# Patient Record
Sex: FEMALE | Race: BLACK OR AFRICAN AMERICAN | Employment: OTHER | URBAN - METROPOLITAN AREA
[De-identification: names, ages, dates, MRNs, and addresses within clinical notes are randomized per-mention and may not be internally consistent; named-entity substitution may affect disease eponyms.]

---

## 2018-01-09 ENCOUNTER — OFFICE VISIT (OUTPATIENT)
Dept: FAMILY MEDICINE CLINIC | Age: 68
End: 2018-01-09

## 2018-01-09 ENCOUNTER — HOSPITAL ENCOUNTER (OUTPATIENT)
Dept: LAB | Age: 68
Discharge: HOME | End: 2018-01-09
Payer: COMMERCIAL

## 2018-01-09 VITALS
SYSTOLIC BLOOD PRESSURE: 124 MMHG | HEIGHT: 64 IN | OXYGEN SATURATION: 98 % | RESPIRATION RATE: 18 BRPM | DIASTOLIC BLOOD PRESSURE: 84 MMHG | HEART RATE: 64 BPM | WEIGHT: 123 LBS | BODY MASS INDEX: 21 KG/M2 | TEMPERATURE: 97.8 F

## 2018-01-09 DIAGNOSIS — E78.5 DYSLIPIDEMIA: ICD-10-CM

## 2018-01-09 DIAGNOSIS — I10 ESSENTIAL HYPERTENSION: Primary | ICD-10-CM

## 2018-01-09 DIAGNOSIS — Z23 ENCOUNTER FOR IMMUNIZATION: ICD-10-CM

## 2018-01-09 DIAGNOSIS — Z78.0 POSTMENOPAUSAL: ICD-10-CM

## 2018-01-09 DIAGNOSIS — Z90.12 H/O LEFT MASTECTOMY: ICD-10-CM

## 2018-01-09 DIAGNOSIS — Z72.0 TOBACCO ABUSE: ICD-10-CM

## 2018-01-09 DIAGNOSIS — Z85.3 HISTORY OF BREAST CANCER: ICD-10-CM

## 2018-01-09 DIAGNOSIS — Z12.11 SCREEN FOR COLON CANCER: ICD-10-CM

## 2018-01-09 DIAGNOSIS — H40.9 GLAUCOMA OF RIGHT EYE, UNSPECIFIED GLAUCOMA TYPE: ICD-10-CM

## 2018-01-09 DIAGNOSIS — H54.40 BLIND LEFT EYE: ICD-10-CM

## 2018-01-09 DIAGNOSIS — Z11.59 NEED FOR HEPATITIS C SCREENING TEST: ICD-10-CM

## 2018-01-09 DIAGNOSIS — I10 ESSENTIAL HYPERTENSION: ICD-10-CM

## 2018-01-09 DIAGNOSIS — Z71.89 ACP (ADVANCE CARE PLANNING): ICD-10-CM

## 2018-01-09 DIAGNOSIS — M25.551 PAIN OF RIGHT HIP JOINT: ICD-10-CM

## 2018-01-09 DIAGNOSIS — R06.2 WHEEZE: ICD-10-CM

## 2018-01-09 DIAGNOSIS — Z12.39 SCREENING FOR BREAST CANCER: ICD-10-CM

## 2018-01-09 DIAGNOSIS — Z13.820 SCREENING FOR OSTEOPOROSIS: ICD-10-CM

## 2018-01-09 DIAGNOSIS — Z00.00 ENCOUNTER FOR MEDICARE ANNUAL WELLNESS EXAM: ICD-10-CM

## 2018-01-09 LAB
ALBUMIN SERPL-MCNC: 4.3 G/DL (ref 3.4–5)
ALBUMIN/GLOB SERPL: 1.4 {RATIO} (ref 0.8–1.7)
ALP SERPL-CCNC: 80 U/L (ref 45–117)
ALT SERPL-CCNC: 19 U/L (ref 13–56)
ANION GAP SERPL CALC-SCNC: 9 MMOL/L (ref 3–18)
AST SERPL-CCNC: 14 U/L (ref 15–37)
BASOPHILS # BLD: 0 K/UL (ref 0–0.06)
BASOPHILS NFR BLD: 0 % (ref 0–2)
BILIRUB SERPL-MCNC: 0.8 MG/DL (ref 0.2–1)
BUN SERPL-MCNC: 12 MG/DL (ref 7–18)
BUN/CREAT SERPL: 18 (ref 12–20)
CALCIUM SERPL-MCNC: 10 MG/DL (ref 8.5–10.1)
CHLORIDE SERPL-SCNC: 105 MMOL/L (ref 100–108)
CHOLEST SERPL-MCNC: 265 MG/DL
CO2 SERPL-SCNC: 28 MMOL/L (ref 21–32)
CREAT SERPL-MCNC: 0.66 MG/DL (ref 0.6–1.3)
DIFFERENTIAL METHOD BLD: NORMAL
EOSINOPHIL # BLD: 0.1 K/UL (ref 0–0.4)
EOSINOPHIL NFR BLD: 2 % (ref 0–5)
ERYTHROCYTE [DISTWIDTH] IN BLOOD BY AUTOMATED COUNT: 14 % (ref 11.6–14.5)
GLOBULIN SER CALC-MCNC: 3.1 G/DL (ref 2–4)
GLUCOSE SERPL-MCNC: 99 MG/DL (ref 74–99)
HCT VFR BLD AUTO: 37.9 % (ref 35–45)
HDLC SERPL-MCNC: 74 MG/DL (ref 40–60)
HDLC SERPL: 3.6 {RATIO} (ref 0–5)
HGB BLD-MCNC: 12.3 G/DL (ref 12–16)
LDLC SERPL CALC-MCNC: 167.8 MG/DL (ref 0–100)
LIPID PROFILE,FLP: ABNORMAL
LYMPHOCYTES # BLD: 2.8 K/UL (ref 0.9–3.6)
LYMPHOCYTES NFR BLD: 44 % (ref 21–52)
MCH RBC QN AUTO: 28.4 PG (ref 24–34)
MCHC RBC AUTO-ENTMCNC: 32.5 G/DL (ref 31–37)
MCV RBC AUTO: 87.5 FL (ref 74–97)
MONOCYTES # BLD: 0.4 K/UL (ref 0.05–1.2)
MONOCYTES NFR BLD: 7 % (ref 3–10)
NEUTS SEG # BLD: 3 K/UL (ref 1.8–8)
NEUTS SEG NFR BLD: 47 % (ref 40–73)
PLATELET # BLD AUTO: 353 K/UL (ref 135–420)
PMV BLD AUTO: 9.4 FL (ref 9.2–11.8)
POTASSIUM SERPL-SCNC: 4.4 MMOL/L (ref 3.5–5.5)
PROT SERPL-MCNC: 7.4 G/DL (ref 6.4–8.2)
RBC # BLD AUTO: 4.33 M/UL (ref 4.2–5.3)
SODIUM SERPL-SCNC: 142 MMOL/L (ref 136–145)
TRIGL SERPL-MCNC: 116 MG/DL (ref ?–150)
VLDLC SERPL CALC-MCNC: 23.2 MG/DL
WBC # BLD AUTO: 6.4 K/UL (ref 4.6–13.2)

## 2018-01-09 PROCEDURE — 80061 LIPID PANEL: CPT | Performed by: FAMILY MEDICINE

## 2018-01-09 PROCEDURE — 80053 COMPREHEN METABOLIC PANEL: CPT | Performed by: FAMILY MEDICINE

## 2018-01-09 PROCEDURE — 85025 COMPLETE CBC W/AUTO DIFF WBC: CPT | Performed by: FAMILY MEDICINE

## 2018-01-09 PROCEDURE — 86803 HEPATITIS C AB TEST: CPT | Performed by: FAMILY MEDICINE

## 2018-01-09 RX ORDER — CETIRIZINE HCL 10 MG
10 TABLET ORAL
Refills: 0 | COMMUNITY
Start: 2018-01-09 | End: 2018-10-17 | Stop reason: SDUPTHER

## 2018-01-09 RX ORDER — BRIMONIDINE TARTRATE, TIMOLOL MALEATE 2; 5 MG/ML; MG/ML
SOLUTION/ DROPS OPHTHALMIC
Refills: 3 | COMMUNITY
Start: 2017-12-14

## 2018-01-09 RX ORDER — CYCLOBENZAPRINE HCL 10 MG
TABLET ORAL
Refills: 0 | COMMUNITY
Start: 2017-10-31 | End: 2018-01-09 | Stop reason: SDUPTHER

## 2018-01-09 RX ORDER — AMLODIPINE BESYLATE 5 MG/1
5 TABLET ORAL DAILY
Refills: 1 | COMMUNITY
Start: 2018-01-09 | End: 2018-10-17 | Stop reason: SDUPTHER

## 2018-01-09 RX ORDER — TRAMADOL HYDROCHLORIDE 50 MG/1
50 TABLET ORAL
COMMUNITY
End: 2018-02-02

## 2018-01-09 RX ORDER — CETIRIZINE HCL 10 MG
TABLET ORAL
Refills: 0 | COMMUNITY
Start: 2017-12-20 | End: 2018-01-09 | Stop reason: SDUPTHER

## 2018-01-09 RX ORDER — AMLODIPINE BESYLATE 5 MG/1
TABLET ORAL
Refills: 1 | COMMUNITY
Start: 2017-12-04 | End: 2018-01-09 | Stop reason: SDUPTHER

## 2018-01-09 RX ORDER — CYCLOBENZAPRINE HCL 10 MG
10 TABLET ORAL
Refills: 0 | COMMUNITY
Start: 2018-01-09 | End: 2018-10-17 | Stop reason: SDUPTHER

## 2018-01-09 RX ORDER — ANASTROZOLE 1 MG/1
TABLET ORAL
Refills: 0 | COMMUNITY
Start: 2017-12-06 | End: 2020-09-01

## 2018-01-09 RX ORDER — ALBUTEROL SULFATE 90 UG/1
AEROSOL, METERED RESPIRATORY (INHALATION)
COMMUNITY
End: 2018-10-17 | Stop reason: SDUPTHER

## 2018-01-09 RX ORDER — ATORVASTATIN CALCIUM 40 MG/1
40 TABLET, FILM COATED ORAL DAILY
Refills: 1 | COMMUNITY
Start: 2018-01-09 | End: 2018-01-11 | Stop reason: DRUGHIGH

## 2018-01-09 RX ORDER — ASPIRIN 81 MG/1
TABLET ORAL
Refills: 2 | COMMUNITY
Start: 2017-12-04 | End: 2018-01-09 | Stop reason: SDUPTHER

## 2018-01-09 RX ORDER — ASPIRIN 81 MG/1
81 TABLET ORAL DAILY
Refills: 2 | COMMUNITY
Start: 2018-01-09 | End: 2018-05-09

## 2018-01-09 RX ORDER — ATORVASTATIN CALCIUM 40 MG/1
TABLET, FILM COATED ORAL
Refills: 1 | COMMUNITY
Start: 2017-12-04 | End: 2018-01-09 | Stop reason: SDUPTHER

## 2018-01-09 NOTE — PATIENT INSTRUCTIONS
Medicare Part B Preventive Services Limitations Recommendation Scheduled   Bone Mass Measurement  (age 72 & older, biennial) Requires diagnosis related to osteoporosis or estrogen deficiency. Biennial benefit unless patient has history of long-term glucocorticoid tx or baseline is needed because initial test was by other method Due    Cardiovascular Screening Blood Tests (every 5 years)  Total cholesterol, HDL, Triglycerides Order as a panel if possible Due    Colorectal Cancer Screening  -Fecal occult blood test (annual)  -Flexible sigmoidoscopy (5y)  -Screening colonoscopy (10y)  -Barium Enema  Due    Counseling to Prevent Tobacco Use (up to 8 sessions per year)  - Counseling greater than 3 and up to 10 minutes  - Counseling greater than 10 minutes Patients must be asymptomatic of tobacco-related conditions to receive as preventive service Patient is a current smoker. PCP provided counseling today. Diabetes Screening Tests (at least every 3 years, Medicare covers annually or at 6-month intervals for prediabetic patients)    Fasting blood sugar (FBS) or glucose tolerance test (GTT) Patient must be diagnosed with one of the following:  -Hypertension, Dyslipidemia, obesity, previous impaired FBS or GTT  Or any two of the following: overweight, FH of diabetes, age ? 72, history of gestational diabetes, birth of baby weighing more than 9 pounds Due    Diabetes Self-Management Training (DSMT) (no USPSTF recommendation) Requires referral by treating physician for patient with diabetes or renal disease. 10 hours of initial DSMT session of no less than 30 minutes each in a continuous 12-month period. 2 hours of follow-up DSMT in subsequent years.  N/A    Glaucoma Screening (no USPSTF recommendation) Diabetes mellitus, family history, , age 48 or over,  American, age 72 or over Due Patient is seeng Dr. Larissa Prado (Opthalmology)   Human Immunodeficiency Virus (HIV) Screening (annually for increased risk patients)  HIV-1 and HIV-2 by EIA, ANGELA, rapid antibody test, or oral mucosa transudate Patient must be at increased risk for HIV infection per USPSTF guidelines or pregnant. Tests covered annually for patients at increased risk. Pregnant patients may receive up to 3 test during pregnancy. N/A    Medical Nutrition Therapy (MNT) (for diabetes or renal disease not recommended schedule) Requires referral by treating physician for patient with diabetes or renal disease. Can be provided in same year as diabetes self-management training (DSMT), and CMS recommends medical nutrition therapy take place after DSMT. Up to 3 hours for initial year and 2 hours in subsequent years. N/A    Shingles Vaccination A shingles vaccine is also recommended once in a lifetime after age 61 Due    Seasonal Influenza Vaccination (annually)  Due    Pneumococcal Vaccination (once after 72)  Due    Hepatitis B Vaccinations (if medium/high risk) Medium/high risk factors:  End-stage renal disease,  Hemophiliacs who received Factor VIII or IX concentrates, Clients of institutions for the mentally retarded, Persons who live in the same house as a HepB virus carrier, Homosexual men, Illicit injectable drug abusers. N/A    Screening Mammography (biennial age 54-69) Annually (age 36 or over) Due Patient has hx of left breast cancer. Screening Pap Tests and Pelvic Examination (up to age 79 and after 79 if unknown history or abnormal study last 10 years) Every 24 months except high risk Due    Ultrasound Screening for Abdominal Aortic Aneurysm (AAA) (once) Patient must be referred through IPPE and not have had a screening for abdominal aortic aneurysm before under Medicare.   Limited to patients who meet one of the following criteria:  - Men who are 73-68 years old and have smoked more than 100 cigarettes in their lifetime.  -Anyone with a FH of AAA  -Anyone recommended for screening by USPSTF N/A

## 2018-01-09 NOTE — PROGRESS NOTES
YESENIA  Leatha Rodriguez comes in to establish care. Hip pain: Patient has right hip pain. Pain comes on and off and has been treated for sacroiliitis. Most recently was put on prednisone and this helped control the pain. Currently he takes tramadol as needed for this. Has yet to be seen by an orthopedist.  She also has trochanteric bursitis. We discussed follow-up by orthopedic physician but the patient would prefer to hold off on this at the moment since she is not having acute pain. We will continue to monitor and consider referral as needed. Breast cancer: Patient has history of left breast cancer and has had left mastectomy. Currently she takes Arimidex and she is followed up by the oncologist.  Does need a mammogram and request will be placed in. Ophthalmology: Patient has glaucoma in her right eye and has had surgery on the same. She takes brimonidine eyedrops for this. She is followed up by the ophthalmologist.  She is blind in her left eye. Hypertension: Patient is on amlodipine 5 mg daily. Blood pressure well controlled. Will check labs today. Dyslipidemia: Patient is a history of dyslipidemia and takes Lipitor 40 mg daily. Tobacco abuse: Patient does smoke occasionally. She states she no longer buy cigarettes and is on be given and this has led her to cut back markedly on amount of smoking. Advised on the advantages of quitting the habit and she does state that she has been trying to do so for a while. Does not want any medication or nicotine replacement therapy.       Past Medical History  Past Medical History:   Diagnosis Date    Arthritis     Blind left eye     Breast cancer ([facility] [address]     Glaucoma     High cholesterol     Hip pain     Hypertension        Surgical History  Past Surgical History:   Procedure Laterality Date    HX MASTECTOMY Left         Medications  Current Outpatient Prescriptions   Medication Sig Dispense Refill    anastrozole (ARIMIDEX) 1 mg tablet TK 1 T PO QD  0    COMBIGAN 0.2-0.5 % drop ophthalmic solution INSTILL 1 DROP DAILY IN RIGHT EYE  3    albuterol (VENTOLIN HFA) 90 mcg/actuation inhaler Take  by inhalation.  traMADol (ULTRAM) 50 mg tablet Take 50 mg by mouth every six (6) hours as needed for Pain.  amLODIPine (NORVASC) 5 mg tablet Take 1 Tab by mouth daily. 1    aspirin delayed-release 81 mg tablet Take 1 Tab by mouth daily. 2    cetirizine (ZYRTEC) 10 mg tablet Take 1 Tab by mouth daily as needed for Allergies. 0    cyclobenzaprine (FLEXERIL) 10 mg tablet Take 1 Tab by mouth three (3) times daily as needed for Muscle Spasm(s). 0    atorvastatin (LIPITOR) 40 mg tablet Take 1 Tab by mouth daily. 1       Allergies  No Known Allergies    Family History  No family history on file.     Social History  Social History     Social History    Marital status:      Spouse name: N/A    Number of children: N/A    Years of education: N/A     Occupational History    Retired      Social History Main Topics    Smoking status: Light Tobacco Smoker     Types: Cigarettes    Smokeless tobacco: Never Used    Alcohol use Yes      Comment: occ    Drug use: Yes     Special: Prescription    Sexual activity: No     Other Topics Concern     Service No    Blood Transfusions No    Caffeine Concern No    Occupational Exposure No    Hobby Hazards No    Sleep Concern No    Stress Concern No    Weight Concern No    Special Diet No    Back Care No    Exercise No    Bike Helmet No    Seat Belt Yes    Self-Exams Yes     Social History Narrative    No narrative on file       Review of Systems  Review of Systems - History obtained from the patient  General ROS: positive for  - fatigue and malaise  negative for - chills or fever  Psychological ROS: negative  Ophthalmic ROS: Glaucoma right eye and blindness left eye  ENT ROS: negative  Allergy and Immunology ROS: negative  Hematological and Lymphatic ROS: negative  Endocrine ROS: negative  Breast ROS: Left breast cancer, status post left mastectomy  Respiratory ROS: no cough, shortness of breath, or wheezing  Cardiovascular ROS: no chest pain or dyspnea on exertion  Gastrointestinal ROS: no abdominal pain, change in bowel habits, or black or bloody stools  Genito-Urinary ROS: negative  Musculoskeletal ROS: positive for - pain in hip - right  Neurological ROS: negative    Vital Signs  Visit Vitals    /84 (BP 1 Location: Left arm, BP Patient Position: Sitting)    Pulse 64    Temp 97.8 °F (36.6 °C) (Oral)    Resp 18    Ht 5' 4\" (1.626 m)    Wt 123 lb (55.8 kg)    SpO2 98%    BMI 21.11 kg/m2         Physical Exam  Physical Examination: General appearance - oriented to person, place, and time and acyanotic, in no respiratory distress  Mental status - alert, oriented to person, place, and time, affect appropriate to mood  Ears - hearing grossly normal bilaterally  Nose - normal and patent, no erythema, discharge or polyps  Mouth - mucous membranes moist, pharynx normal without lesions  Neck - supple, no significant adenopathy  Lymphatics - no palpable lymphadenopathy, no hepatosplenomegaly  Chest - clear to auscultation, no wheezes, rales or rhonchi, symmetric air entry  Heart - S1 and S2 normal  Abdomen - soft, nontender, nondistended, no masses or organomegaly  Breasts -status post left mastectomy  Back exam - limited range of motion  Neurological - alert, oriented, normal speech, no focal findings or movement disorder noted  Musculoskeletal - osteoarthritic changes noted in both hands, discomfort right hip to palpation  Extremities - no pedal edema noted, intact peripheral pulses    Results  No results found for this or any previous visit. ASSESSMENT and PLAN    ICD-10-CM ICD-9-CM    1. Essential hypertension I10 401.9 CBC WITH AUTOMATED DIFF      METABOLIC PANEL, COMPREHENSIVE      NM COLLECTION VENOUS BLOOD,VENIPUNCTURE   2.  History of breast cancer Z85.3 V10.3 ELLIOT MAMMO BI SCREENING INCL CAD      WY COLLECTION VENOUS BLOOD,VENIPUNCTURE   3. H/O left mastectomy Z90.12 V45.71 ELLIOT MAMMO BI SCREENING INCL CAD   4. Dyslipidemia E78.5 272.4 LIPID PANEL      WY COLLECTION VENOUS BLOOD,VENIPUNCTURE   5. Pain of right hip joint M25.551 719.45    6. Glaucoma of right eye, unspecified glaucoma type H40.9 365.9    7. Blind left eye H54.40 369.60    8. Wheeze R06.2 786.07    9. Encounter for Medicare annual wellness exam Z00.00 V70.0    10. ACP (advance care planning) Z71.89 V65.49    11. Postmenopausal Z78.0 V49.81 DEXA BONE DENSITY STUDY AXIAL   12. Screen for colon cancer Z12.11 V76.51 REFERRAL TO COLON AND RECTAL SURGERY   15. Screening for breast cancer Z12.31 V76.10 [facility] MAMMO BI SCREENING INCL CAD   15. Screening for osteoporosis Z13.820 V82.81 DEXA BONE DENSITY STUDY AXIAL   15. Need for hepatitis C screening test Z11.59 V73.89 HEPATITIS C AB   16. Tobacco abuse Z72.0 305.1    17. Encounter for immunization Z23 V03.89 INFLUENZA VIRUS VACCINE, HIGH DOSE SEASONAL, PRESERVATIVE FREE      ADMIN INFLUENZA VIRUS VAC     lab results and schedule of future lab studies reviewed with patient  reviewed diet, exercise and weight control  very strongly urged to quit smoking to reduce cardiovascular risk  cardiovascular risk and specific lipid/LDL goals reviewed  reviewed medications and side effects in detail  use of aspirin to prevent MI and TIA's discussed  radiology results and schedule of future radiology studies reviewed with patient    I have discussed the diagnosis with the patient and the intended plan of care as seen in the above orders. The patient has received an after-visit summary and questions were answered concerning future plans. I have discussed medication, side effects, and warnings with the patient in detail. The patient verbalized understanding and is in agreement with the plan of care. The patient will contact the office with any additional concerns.     Boris Dinh MD

## 2018-01-09 NOTE — PROGRESS NOTES
Chief Complaint   Patient presents with   [facility] [place] [place]ness Visit     1. Have you been to the ER, urgent care clinic since your last visit? Hospitalized since your last visit? Yes, about 2 months ago at [facility] for right hip pain. 2. Have you seen or consulted any other health care providers outside of the [address] [place] [address] since your last visit? Include any pap smears or colon screening. No      This is a Subsequent Medicare Annual Wellness Exam (AWV) (Performed 12 months after IPPE or effective date of Medicare Part B enrollment)    I have reviewed the patient's medical history in detail and updated the computerized patient record. History   Paulina Flores comes in for Medicare wellness visit. Past Medical History:   Diagnosis Date    Arthritis     Blind left eye     Breast cancer ([address]     Glaucoma     High cholesterol     Hip pain     Hypertension       Past Surgical History:   Procedure Laterality Date    HX MASTECTOMY Left      Current Outpatient Prescriptions   Medication Sig Dispense Refill    anastrozole (ARIMIDEX) 1 mg tablet TK 1 T PO  QD  0    cetirizine (ZYRTEC) 10 mg tablet   0    aspirin delayed-release 81 mg tablet   2    amLODIPine (NORVASC) 5 mg tablet   1    cyclobenzaprine (FLEXERIL) 10 mg tablet   0    atorvastatin (LIPITOR) 40 mg tablet   1    COMBIGAN 0.2-0.5 % drop ophthalmic solution INSTILL 1 DROP DAILY IN RIGHT EYE  3    albuterol (VENTOLIN HFA) 90 mcg/actuation inhaler Take  by inhalation.  traMADol (ULTRAM) 50 mg tablet Take 50 mg by mouth every six (6) hours as needed for Pain. No Known Allergies  No family history on file. Social History   Substance Use Topics    Smoking status: Light Tobacco Smoker     Types: Cigarettes    Smokeless tobacco: Never Used    Alcohol use Yes      Comment: occ     There is no problem list on file for this patient.       Depression Risk Factor Screening:     PHQ over the last two weeks 1/9/2018   Little interest or pleasure in doing things Not at all   Feeling down, depressed or hopeless Not at all   Total Score PHQ 2 0     Alcohol Risk Factor Screening: On any occasion in the past three months you have had more than 3 drinks containing alcohol. Functional Ability and Level of Safety:   Hearing Loss  Hearing is good. Activities of Daily Living  The home contains: no safety equipment. Patient does total self care    Fall Risk  Fall Risk Assessment, last 12 mths 1/9/2018   Able to walk? Yes   Fall in past 12 months? No       Abuse Screen  Patient is not abused    Cognitive Screening   Evaluation of Cognitive Function:  Has your family/caregiver stated any concerns about your memory: no  Normal    Review of Systems   A comprehensive review of systems was negative except for that written in the HPI. Physical Examination     Visit Vitals    /84 (BP 1 Location: Left arm, BP Patient Position: Sitting)    Pulse 64    Temp 97.8 °F (36.6 °C) (Oral)    Resp 18    Ht 5' 4\" (1.626 m)    Wt 123 lb (55.8 kg)    SpO2 98%    BMI 21.11 kg/m2     General appearance: alert, cooperative, no distress, appears stated age  Head: Normocephalic, without obvious abnormality, atraumatic  Lungs: clear to auscultation bilaterally  Heart: S1, S2 normal  Pulses: 2+ and symmetric  Neurologic: Alert and oriented X 3, normal strength and tone. Normal symmetric reflexes.  Normal coordination and gait    Patient Care Team   Patient Care Team:  Melida Andrews MD as PCP - General (Family Practice)  Josie Thakur MD (Ophthalmology)  Collins Hendrickson MD (Surgery)  Erika Cadena MD (Hematology and Oncology)    Assessment/Plan   Education and counseling provided:  Are appropriate based on today's review and evaluation  End-of-Life planning (with patient's consent)  Pneumococcal Vaccine  Influenza Vaccine  Screening Mammography  Colorectal cancer screening tests  Cardiovascular screening blood test  Bone mass measurement (DEXA)  Screening for glaucoma  Diabetes screening test    1. Encounter for Medicare annual wellness exam    2. ACP (advance care planning)    3. Screen for colon cancer  [facility] ref [facility]    4. Screening for breast cancer  - ELLIOT MAMMO BI SCREENING INCL CAD; Future    5. Screening for osteoporosis  - DEXA BONE DENSITY STUDY AXIAL; Future    6. Need for hepatitis C screening test  - HEPATITIS C AB; Future    7. Encounter for immunization  - Influenza virus vaccine ([address]) 72 years and older ([address])  - Administration fee () for Medicare insured patients    Health Maintenance Due   Topic Date Due    Hepatitis C Screening  1950    DTaP/Tdap/Td series (1 - Tdap) 12/08/1970    BREAST CANCER SCRN MAMMOGRAM  12/08/1999    FOBT Q 1 YEAR AGE 50-75  12/08/1999    ZOSTER VACCINE AGE 60>  10/08/2009    GLAUCOMA SCREENING Q2Y  12/08/2014    OSTEOPOROSIS SCREENING (DEXA)  12/08/2014    Pneumococcal 65+ Low/Medium Risk (1 of 2 - PCV13) 12/08/2014    MEDICARE YEARLY EXAM  12/08/2014    Influenza Age 9 to Adult  08/01/2017   I have discussed the diagnosis with the patient and the intended plan of care as seen in the above orders. The patient has received an after-visit summary and questions were answered concerning future plans. I have discussed medication, side effects, and warnings with the patient in detail. The patient verbalized understanding and is in agreement with the plan of care. The patient will contact the office with any additional concerns. Discussed, printed and given to patient.     Blanche Rose MD personalized preventative plan of care was

## 2018-01-09 NOTE — MR AVS SNAPSHOT
Visit Information Date & Time Provider Department Dept. Phone Encounter #  
 1/9/2018 11:00 AM Lele Scanlon. [address] [facility] [phone] [id number] Follow-up Instructions Return in about 1 month (around 2/9/2018), or if symptoms worsen or fail to improve, for htn, dyslipidemia, tobacco abuse. Upcoming Health Maintenance Date Due Hepatitis C Screening 1950 DTaP/Tdap/Td series (1 - Tdap) 12/8/1970 BREAST CANCER SCRN MAMMOGRAM 12/8/1999 FOBT Q 1 YEAR AGE 50-75 12/8/1999 ZOSTER VACCINE AGE 60> 10/8/2009 OSTEOPOROSIS SCREENING (DEXA) 12/8/2014 Pneumococcal 65+ Low/Medium Risk (1 of 2 - PCV13) 12/8/2014 MEDICARE YEARLY EXAM 12/8/2014 Influenza Age 5 to Adult 8/1/2017 GLAUCOMA SCREENING Q2Y 1/9/2020 Allergies as of 1/9/2018  Review Complete On: 1/9/2018 By: Charlene Mendez MD  
 No Known Allergies Current Immunizations  Never Reviewed No immunizations on file. Not reviewed this visit You Were Diagnosed With   
  
 Codes Comments History of breast cancer    -  Primary ICD-10-CM: Z85.3 ICD-9-CM: V10.3 H/O left mastectomy     ICD-10-CM: Z90.12 ICD-9-CM: V45.71 Dyslipidemia     ICD-10-CM: E78.5 ICD-9-CM: 272.4 Essential hypertension     ICD-10-CM: I10 
ICD-9-CM: 401.9 Pain of right hip joint     ICD-10-CM: M25.551 ICD-9-CM: 719.45 Glaucoma of right eye, unspecified glaucoma type     ICD-10-CM: H40.9 ICD-9-CM: 365.9 Blind left eye     ICD-10-CM: H54.40 ICD-9-CM: 369.60 Wheeze     ICD-10-CM: R06.2 ICD-9-CM: 786.07 Encounter for Medicare annual wellness exam     ICD-10-CM: Z00.00 ICD-9-CM: V70.0 ACP (advance care planning)     ICD-10-CM: Z71.89 ICD-9-CM: V65.49 Postmenopausal     ICD-10-CM: Z78.0 ICD-9-CM: V49.81 Screen for colon cancer     ICD-10-CM: Z12.11 ICD-9-CM: V76.51 Screening for breast cancer     ICD-10-CM: Z12.31 
ICD-9-CM: V76.10 Screening for osteoporosis     ICD-10-CM: Z13.820 ICD-9-CM: V82.81 Need for hepatitis C screening test     ICD-10-CM: Z11.59 
ICD-9-CM: V73.89 Tobacco abuse     ICD-10-CM: Z72.0 ICD-9-CM: 305.1 Vitals BP Pulse Temp Resp Height(growth percentile) Weight(growth percentile) 124/84 (BP 1 Location: Left arm, BP Patient Position: Sitting) 64 97.8 °F (36.6 °C) (Oral) 18 5' 4\" (1.626 m) 123 lb (55.8 kg) SpO2 BMI Smoking Status 98% 21.11 kg/m2 Light Tobacco Smoker BMI and BSA Data Body Mass Index Body Surface Area  
 21.11 kg/m 2 1.59 m 2 Your Updated Medication List  
  
   
This list is accurate as of: 1/9/18 11:59 AM.  Always use your most recent med list. amLODIPine 5 mg tablet Commonly known as:  Dickens Soles Take 1 Tab by mouth daily. anastrozole 1 mg tablet Commonly known as:  ARIMIDEX TK 1 T PO  QD  
  
 aspirin delayed-release 81 mg tablet Take 1 Tab by mouth daily. atorvastatin 40 mg tablet Commonly known as:  LIPITOR Take 1 Tab by mouth daily. cetirizine 10 mg tablet Commonly known as:  ZYRTEC Take 1 Tab by mouth daily as needed for Allergies. COMBIGAN 0.2-0.5 % Drop ophthalmic solution Generic drug:  brimonidine-timolol INSTILL 1 DROP DAILY IN RIGHT EYE  
  
 cyclobenzaprine 10 mg tablet Commonly known as:  FLEXERIL Take 1 Tab by mouth three (3) times daily as needed for Muscle Spasm(s). traMADol 50 mg tablet Commonly known as:  ULTRAM  
Take 50 mg by mouth every six (6) hours as needed for Pain. VENTOLIN HFA 90 mcg/actuation inhaler Generic drug:  albuterol Take  by inhalation. We Performed the Following REFERRAL TO COLON AND RECTAL SURGERY [REF17 Custom] Comments:  
 Screening colonoscopy Follow-up Instructions Return in about 1 month (around 2/9/2018), or if symptoms worsen or fail to improve, for htn, dyslipidemia, tobacco abuse. To-Do List   
 01/09/2018 Lab:  CBC WITH AUTOMATED DIFF   
  
 01/09/2018 Imaging:  DEXA BONE DENSITY STUDY AXIAL   
  
 01/09/2018 Lab:  HEPATITIS C AB   
  
 01/09/2018 Lab:  LIPID PANEL   
  
 01/09/2018 Imaging:  ELLIOT MAMMO BI SCREENING INCL CAD   
  
 01/09/2018 Lab:  METABOLIC PANEL, COMPREHENSIVE Referral Information Referral ID Referred By Referred To  
  
 [id number] DEMAR, [address] [facility], [address] [facility], [address] Phone: [phone] Fax: [phone] Visits Status Start Date End Date 1 New Request 1/9/18 1/9/19 If your referral has a status of pending review or denied, additional information will be sent to support the outcome of this decision. Patient Instructions Medicare Part B Preventive Services Limitations Recommendation Scheduled Bone Mass Measurement 
(age 72 & older, biennial) Requires diagnosis related to osteoporosis or estrogen deficiency. Biennial benefit unless patient has history of long-term glucocorticoid tx or baseline is needed because initial test was by other method Due   
Cardiovascular Screening Blood Tests (every 5 years) Total cholesterol, HDL, Triglycerides Order as a panel if possible Due Colorectal Cancer Screening 
-Fecal occult blood test (annual) -Flexible sigmoidoscopy (5y) 
-Screening colonoscopy (10y) -Barium Enema  Due   
Counseling to Prevent Tobacco Use (up to 8 sessions per year) - Counseling greater than 3 and up to 10 minutes - Counseling greater than 10 minutes Patients must be asymptomatic of tobacco-related conditions to receive as preventive service Patient is a current smoker. PCP provided counseling today. Diabetes Screening Tests (at least every 3 years, Medicare covers annually or at 6-month intervals for prediabetic patients) Fasting blood sugar (FBS) or glucose tolerance test (GTT) Patient must be diagnosed with one of the following: 
-Hypertension, Dyslipidemia, obesity, previous impaired FBS or GTT 
Or any two of the following: overweight, FH of diabetes, age ? 72, history of gestational diabetes, birth of baby weighing more than 9 pounds Due Diabetes Self-Management Training (DSMT) (no USPSTF recommendation) Requires referral by treating physician for patient with diabetes or renal disease. 10 hours of initial DSMT session of no less than 30 minutes each in a continuous 12-month period. 2 hours of follow-up DSMT in subsequent years. N/A Glaucoma Screening (no USPSTF recommendation) Diabetes mellitus, family history, , age 48 or over,  American, age 72 or over Due Patient is seeng Dr. Tonya Bishop (Opthalmology) Human Immunodeficiency Virus (HIV) Screening (annually for increased risk patients) HIV-1 and HIV-2 by EIA, ANGELA, rapid antibody test, or oral mucosa transudate Patient must be at increased risk for HIV infection per USPSTF guidelines or pregnant. Tests covered annually for patients at increased risk. Pregnant patients may receive up to 3 test during pregnancy. N/A Medical Nutrition Therapy (MNT) (for diabetes or renal disease not recommended schedule) Requires referral by treating physician for patient with diabetes or renal disease. Can be provided in same year as diabetes self-management training (DSMT), and CMS recommends medical nutrition therapy take place after DSMT. Up to 3 hours for initial year and 2 hours in subsequent years. N/A Shingles Vaccination A shingles vaccine is also recommended once in a lifetime after age 61 Due Seasonal Influenza Vaccination (annually)  Due Pneumococcal Vaccination (once after 65)  Due   
Hepatitis B Vaccinations (if medium/high risk) Medium/high risk factors:  End-stage renal disease, Hemophiliacs who received Factor VIII or IX concentrates, Clients of institutions for the mentally retarded, Persons who live in the same house as a HepB virus carrier, Homosexual men, Illicit injectable drug abusers. N/A Screening Mammography (biennial age 54-69) Annually (age 36 or over) Due Patient has hx of left breast cancer. Screening Pap Tests and Pelvic Examination (up to age 79 and after 79 if unknown history or abnormal study last 10 years) Every 24 months except high risk Due Ultrasound Screening for Abdominal Aortic Aneurysm (AAA) (once) Patient must be referred through IPPE and not have had a screening for abdominal aortic aneurysm before under Medicare. Limited to patients who meet one of the following criteria: 
- Men who are 73-68 years old and have smoked more than 100 cigarettes in their lifetime. 
-Anyone with a FH of AAA 
-Anyone recommended for screening by USPSTF N/A Introducing [facility]! [facility] introduces [employer] patient portal. Now you can access parts of your medical record, email your doctor's office, and request medication refills online. 1. In your internet browser, go to [email or web address] 2. Click on the First Time User? Click Here link in the Sign In box. You will see the New Member Sign Up page. 3. Enter your [employer] Access Code exactly as it appears below. You will not need to use this code after youve completed the sign-up process. If you do not sign up before the expiration date, you must request a new code. · [employer] Access Code: [email or web address] Expires: 4/9/2018 10:52 AM 
 
4. Enter the last four digits of your Social Security Number (xxxx) and Date of Birth (mm/dd/yyyy) as indicated and click Submit. You will be taken to the next sign-up page. 5. Create a agreement24 avtal24t ID. This will be your [employer] login ID and cannot be changed, so think of one that is secure and easy to remember. 6. Create a agreement24 avtal24t password. You can change your password at any time. 7. Enter your Password Reset Question and Answer. This can be used at a later time if you forget your password. 8. Enter your e-mail address. You will receive e-mail notification when new information is available in [address] 9. Click Sign Up. You can now view and download portions of your medical record. 10. Click the Download Summary menu link to download a portable copy of your medical information. If you have questions, please visit the Frequently Asked Questions section of the [employer] website. Remember, [employer] is NOT to be used for urgent needs. For medical emergencies, dial 911. Now available from your iPhone and Android! Please provide this summary of care documentation to your next provider. Your primary care clinician is listed as Blanche Robertson. If you have any questions after today's visit, please call [phone].

## 2018-01-09 NOTE — PROGRESS NOTES
Radha Ragland is a 75 y.o. female who presents for routine immunizations. She denies any symptoms , reactions or allergies that would exclude them from being immunized today. Risks and adverse reactions were discussed and the VIS was given to them. All questions were addressed. She was observed for 15min post injection. There were no reactions observed.     Jessica Chang LPN

## 2018-01-10 LAB
HCV AB SER IA-ACNC: 0.02 INDEX
HCV AB SERPL QL IA: NEGATIVE
HCV COMMENT,HCGAC: NORMAL

## 2018-01-10 NOTE — ACP (ADVANCE CARE PLANNING)
Advance Care Planning (ACP) Provider Note - Comprehensive     Date of ACP Conversation: 01/09/18  Persons included in Conversation:  patient  Length of ACP Conversation in minutes:  16 minutes    Authorized Decision Maker (if patient is incapable of making informed decisions): This person is:  Patient's daughter would be. Patient willAuthorized decision maker discussed with her daughter though she does state that she would like everything done at the moment. She will fill out forms given and bring these back for scanning into the EMR chart. General ACP for ALL Patients with Decision Making Capacity:   Importance of advance care planning, including choosing a healthcare agent to communicate patient's healthcare decisions if patient lost the ability to make decisions, such as after a sudden illness or accident  Understanding of the healthcare agent role was assessed and information provided  Exploration of values, goals, and preferences if recovery is not expected, even with continued medical treatment in the event of: Imminent death  Severe, permanent brain injury  \"In these circumstances, what matters most to you? \"  Care focused more on comfort or quality of life.   Opportunity offered to explore how cultural, Moravian, spiritual, or personal beliefs would affect decisions for future care     Interventions Provided:  Recommended completion of Advance Directive form after review of ACP materials and conversation with prospective healthcare agent      Blanche Crook MD

## 2018-01-11 DIAGNOSIS — E78.5 DYSLIPIDEMIA: Primary | ICD-10-CM

## 2018-01-11 RX ORDER — ATORVASTATIN CALCIUM 80 MG/1
80 TABLET, FILM COATED ORAL DAILY
Qty: 30 TAB | Refills: 2 | Status: SHIPPED | OUTPATIENT
Start: 2018-01-11 | End: 2018-10-17 | Stop reason: SDUPTHER

## 2018-01-11 NOTE — PROGRESS NOTES
Please let patient know her LDL cholesterol is elevated at 167. We would like this to go down to below 100. I would advise that she increase her Lipitor to 80 mg daily. Recheck labs in 3 months. I will send in a prescription for Lipitor at the higher dose. She should also exercise and take diet low in polysaturated fats.   Luanne Casey MD

## 2018-01-17 NOTE — PROGRESS NOTES
Spoke with patient regarding lab results at this time. Patient verbalized understanding at this time.

## 2018-02-02 ENCOUNTER — OFFICE VISIT (OUTPATIENT)
Dept: FAMILY MEDICINE CLINIC | Age: 68
End: 2018-02-02

## 2018-02-02 VITALS
SYSTOLIC BLOOD PRESSURE: 119 MMHG | DIASTOLIC BLOOD PRESSURE: 70 MMHG | RESPIRATION RATE: 20 BRPM | HEIGHT: 64 IN | WEIGHT: 124 LBS | BODY MASS INDEX: 21.17 KG/M2 | OXYGEN SATURATION: 97 % | HEART RATE: 68 BPM | TEMPERATURE: 97.9 F

## 2018-02-02 DIAGNOSIS — M47.816 SPONDYLOSIS OF LUMBAR SPINE: ICD-10-CM

## 2018-02-02 DIAGNOSIS — M54.31 SCIATICA OF RIGHT SIDE: ICD-10-CM

## 2018-02-02 DIAGNOSIS — M54.41 ACUTE RIGHT-SIDED LOW BACK PAIN WITH RIGHT-SIDED SCIATICA: Primary | ICD-10-CM

## 2018-02-02 RX ORDER — ACETAMINOPHEN AND CODEINE PHOSPHATE 300; 30 MG/1; MG/1
1 TABLET ORAL
Qty: 10 TAB | Refills: 0 | Status: SHIPPED | OUTPATIENT
Start: 2018-02-02 | End: 2018-02-12

## 2018-02-02 RX ORDER — METHYLPREDNISOLONE 4 MG/1
TABLET ORAL
Qty: 1 DOSE PACK | Refills: 0 | Status: SHIPPED | OUTPATIENT
Start: 2018-02-02 | End: 2018-02-12

## 2018-02-02 NOTE — PATIENT INSTRUCTIONS
Sciatica: Care Instructions  Your Care Instructions    Sciatica (say \"fmj-DA-ju-kuh\") is an irritation of one of the sciatic nerves, which come from the spinal cord in the lower back. The sciatic nerves and their branches extend down through the buttock to the foot. Sciatica can develop when an injured disc in the back presses against a spinal nerve root. Its main symptom is pain, numbness, or weakness that is often worse in the leg or foot than in the back. Sciatica often will improve and go away with time. Early treatment usually includes medicines and exercises to relieve pain. Follow-up care is a key part of your treatment and safety. Be sure to make and go to all appointments, and call your doctor if you are having problems. It's also a good idea to know your test results and keep a list of the medicines you take. How can you care for yourself at home? · Take pain medicines exactly as directed. ¨ If the doctor gave you a prescription medicine for pain, take it as prescribed. ¨ If you are not taking a prescription pain medicine, ask your doctor if you can take an over-the-counter medicine. · Use heat or ice to relieve pain. ¨ To apply heat, put a warm water bottle, heating pad set on low, or warm cloth on your back. Do not go to sleep with a heating pad on your skin. ¨ To use ice, put ice or a cold pack on the area for 10 to 20 minutes at a time. Put a thin cloth between the ice and your skin. · Avoid sitting if possible, unless it feels better than standing. · Alternate lying down with short walks. Increase your walking distance as you are able to without making your symptoms worse. · Do not do anything that makes your symptoms worse. When should you call for help? Call 911 anytime you think you may need emergency care. For example, call if:  · You are unable to move a leg at all.   Call your doctor now or seek immediate medical care if:  · You have new or worse symptoms in your legs or buttocks. Symptoms may include:  ¨ Numbness or tingling. ¨ Weakness. ¨ Pain. · You lose bladder or bowel control. Watch closely for changes in your health, and be sure to contact your doctor if:  · You are not getting better as expected. Where can you learn more? Go to http://lady-per.info/. Enter [phone] in the search box to learn more about \"Sciatica: Care Instructions. \"  Current as of: March 21, 2017  Content Version: 11.4  © 3446-0219 "[employer] Care instructions adapted under license by [employer] (which disclaims liability or warranty for this information). If you have questions about a medical condition or this instruction, always ask your healthcare professional. [address] any warranty or liability for your use of this information. Sciatica: Exercises  Your Care Instructions  Here are some examples of typical rehabilitation exercises for your condition. Start each exercise slowly. Ease off the exercise if you start to have pain. Your doctor or physical therapist will tell you when you can start these exercises and which ones will work best for you. When you are not being active, find a comfortable position for rest. Some people are comfortable on the floor or a medium-firm bed with a small pillow under their head and another under their knees. Some people prefer to lie on their side with a pillow between their knees. Don't stay in one position for too long. Take short walks (10 to 20 minutes) every 2 to 3 hours. Avoid slopes, hills, and stairs until you feel better. Walk only distances you can manage without pain, especially leg pain. How to do the exercises  Back stretches    1. Get down on your hands and knees on the floor. 2. Relax your head and allow it to droop. Round your back up toward the ceiling until you feel a nice stretch in your upper, middle, and lower back.  Hold this stretch for as long as it feels comfortable, or about 15 to 30 seconds. 3. Return to the starting position with a flat back while you are on your hands and knees. 4. Let your back sway by pressing your stomach toward the floor. Lift your buttocks toward the ceiling. 5. Hold this position for 15 to 30 seconds. 6. Repeat 2 to 4 times. Follow-up care is a key part of your treatment and safety. Be sure to make and go to all appointments, and call your doctor if you are having problems. It's also a good idea to know your test results and keep a list of the medicines you take. Where can you learn more? Go to http://lady-per.info/. Enter D995 in the search box to learn more about \"Sciatica: Exercises. \"  Current as of: March 21, 2017  Content Version: 11.4  © 0233-5163 [employer]. Care instructions adapted under license by [employer] (which disclaims liability or warranty for this information). If you have questions about a medical condition or this instruction, always ask your healthcare professional. [address] any warranty or liability for your use of this information.

## 2018-02-02 NOTE — MR AVS SNAPSHOT
[facility] [address] [facility] 
[phone] Patient: Marilin Carrera MRN: [id number] :1950 Visit Information Date & Time Provider Department Dept. Phone Encounter #  
 2018  1:30 PM Christianne Husain, [address] [phone] [id number] Follow-up Instructions Return if symptoms worsen or fail to improve. Your Appointments 2018 11:00 AM  
ROUTINE CARE with Blanche Sanderson MD  
[address] ([address]) Appt Note: htn, dyslipidemia, tobacco abuse [address] [place] [address]  
[phone]  
  
   
 [address]  
  
    
 3/16/2018 11:00 AM  
COLON SCREEN with TSS [facility] NURSE VISIT [facility] ([address]) Appt Note: ref from [facility] screen; ref from [facility] screen [address] [place] [address] [address] [place] [address] Upcoming Health Maintenance Date Due DTaP/Tdap/Td series (1 - Tdap) 1970 BREAST CANCER SCRN MAMMOGRAM 1999 FOBT Q 1 YEAR AGE 50-75 1999 ZOSTER VACCINE AGE 60> 10/8/2009 OSTEOPOROSIS SCREENING (DEXA) 2014 Pneumococcal 65+ Low/Medium Risk (1 of 2 - PCV13) 2014 MEDICARE YEARLY EXAM 1/10/2019 GLAUCOMA SCREENING Q2Y 2020 Allergies as of 2018  Review Complete On: 2018 By: Christianne Husain NP No Known Allergies Current Immunizations  Never Reviewed Name Date Influenza High Dose Vaccine PF 2018 Not reviewed this visit You Were Diagnosed With   
  
 Codes Comments Acute right-sided low back pain with right-sided sciatica    -  Primary ICD-10-CM: M54.41 
ICD-9-CM: 724.2, 724.3 Sciatica of right side     ICD-10-CM: M54.31 
ICD-9-CM: 724.3 Vitals BP Pulse Temp Resp Height(growth percentile) Weight(growth percentile) 119/70 (BP 1 Location: Right arm, BP Patient Position: Sitting) 68 97.9 °F (36.6 °C) 20 5' 4\" (1.626 m) 124 lb (56.2 kg) SpO2 BMI OB Status Smoking Status 97% 21.28 kg/m2 Menopause Light Tobacco Smoker Vitals History BMI and BSA Data Body Mass Index Body Surface Area  
 21.28 kg/m 2 1.59 m 2 Preferred Pharmacy Pharmacy Name Phone [id number] [address] [phone] Your Updated Medication List  
  
   
This list is accurate as of: 2/2/18  2:09 PM.  Always use your most recent med list.  
  
  
  
  
 acetaminophen-codeine 300-30 mg per tablet Commonly known as:  TYLENOL #3 Take 1 Tab by mouth every eight (8) hours as needed for Pain. Max Daily Amount: 3 Tabs. amLODIPine 5 mg tablet Commonly known as:  Claudell Debo Take 1 Tab by mouth daily. anastrozole 1 mg tablet Commonly known as:  ARIMIDEX TK 1 T PO  QD  
  
 aspirin delayed-release 81 mg tablet Take 1 Tab by mouth daily. atorvastatin 80 mg tablet Commonly known as:  LIPITOR Take 1 Tab by mouth daily. cetirizine 10 mg tablet Commonly known as:  ZYRTEC Take 1 Tab by mouth daily as needed for Allergies. COMBIGAN 0.2-0.5 % Drop ophthalmic solution Generic drug:  brimonidine-timolol INSTILL 1 DROP DAILY IN RIGHT EYE  
  
 cyclobenzaprine 10 mg tablet Commonly known as:  FLEXERIL Take 1 Tab by mouth three (3) times daily as needed for Muscle Spasm(s). methylPREDNISolone 4 mg tablet Commonly known as:  Meeta Loyall Take as directed  
  
 traMADol 50 mg tablet Commonly known as:  ULTRAM  
Take 50 mg by mouth every six (6) hours as needed for Pain. VENTOLIN HFA 90 mcg/actuation inhaler Generic drug:  albuterol Take  by inhalation. Prescriptions Printed Refills acetaminophen-codeine (TYLENOL #3) 300-30 mg per tablet 0 Sig: Take 1 Tab by mouth every eight (8) hours as needed for Pain. Max Daily Amount: 3 Tabs. Class: Print Route: Oral  
  
Prescriptions Sent to Pharmacy Refills  
 methylPREDNISolone (MEDROL DOSEPACK) 4 mg tablet 0 Sig: Take as directed Class: Normal  
 Pharmacy: [facility]ON[id number] [place] [address] [facility] #: [phone] Follow-up Instructions Return if symptoms worsen or fail to improve. Patient Instructions Sciatica: Care Instructions Your Care Instructions Sciatica (say \"bpn-RA-oy-kuh\") is an irritation of one of the sciatic nerves, which come from the spinal cord in the lower back. The sciatic nerves and their branches extend down through the buttock to the foot. Sciatica can develop when an injured disc in the back presses against a spinal nerve root. Its main symptom is pain, numbness, or weakness that is often worse in the leg or foot than in the back. Sciatica often will improve and go away with time. Early treatment usually includes medicines and exercises to relieve pain. Follow-up care is a key part of your treatment and safety. Be sure to make and go to all appointments, and call your doctor if you are having problems. It's also a good idea to know your test results and keep a list of the medicines you take. How can you care for yourself at home? · Take pain medicines exactly as directed. ¨ If the doctor gave you a prescription medicine for pain, take it as prescribed. ¨ If you are not taking a prescription pain medicine, ask your doctor if you can take an over-the-counter medicine. · Use heat or ice to relieve pain. ¨ To apply heat, put a warm water bottle, heating pad set on low, or warm cloth on your back. Do not go to sleep with a heating pad on your skin.  
¨ To use ice, put ice or a cold pack on the area for 10 to 20 minutes at a time. Put a thin cloth between the ice and your skin. · Avoid sitting if possible, unless it feels better than standing. · Alternate lying down with short walks. Increase your walking distance as you are able to without making your symptoms worse. · Do not do anything that makes your symptoms worse. When should you call for help? Call 911 anytime you think you may need emergency care. For example, call if: 
· You are unable to move a leg at all. Call your doctor now or seek immediate medical care if: 
· You have new or worse symptoms in your legs or buttocks. Symptoms may include: ¨ Numbness or tingling. ¨ Weakness. ¨ Pain. · You lose bladder or bowel control. Watch closely for changes in your health, and be sure to contact your doctor if: 
· You are not getting better as expected. Where can you learn more? Go to http://ladyAHS PharmStatper.info/. Enter [phone] in the search box to learn more about \"Sciatica: Care Instructions. \" Current as of: March 21, 2017 Content Version: 11.4 © 9362-6138 Tray. Care instructions adapted under license by [employer] (which disclaims liability or warranty for this information). If you have questions about a medical condition or this instruction, always ask your healthcare professional. [address] any warranty or liability for your use of this information. Sciatica: Exercises Your Care Instructions Here are some examples of typical rehabilitation exercises for your condition. Start each exercise slowly. Ease off the exercise if you start to have pain. Your doctor or physical therapist will tell you when you can start these exercises and which ones will work best for you. When you are not being active, find a comfortable position for rest. Some people are comfortable on the floor or a medium-firm bed with a small pillow under their head and another under their knees.  Some people prefer to lie on their side with a pillow between their knees. Don't stay in one position for too long. Take short walks (10 to 20 minutes) every 2 to 3 hours. Avoid slopes, hills, and stairs until you feel better. Walk only distances you can manage without pain, especially leg pain. How to do the exercises Back stretches 1. Get down on your hands and knees on the floor. 2. Relax your head and allow it to droop. Round your back up toward the ceiling until you feel a nice stretch in your upper, middle, and lower back. Hold this stretch for as long as it feels comfortable, or about 15 to 30 seconds. 3. Return to the starting position with a flat back while you are on your hands and knees. 4. Let your back sway by pressing your stomach toward the floor. Lift your buttocks toward the ceiling. 5. Hold this position for 15 to 30 seconds. 6. Repeat 2 to 4 times. Follow-up care is a key part of your treatment and safety. Be sure to make and go to all appointments, and call your doctor if you are having problems. It's also a good idea to know your test results and keep a list of the medicines you take. Where can you learn more? Go to http://lady-per.info/. Enter V705 in the search box to learn more about \"Sciatica: Exercises. \" Current as of: March 21, 2017 Content Version: 11.4 © 0237-5527 Healthwise[employer]orporated. Care instructions adapted under license by [employer] (which disclaims liability or warranty for this information). If you have questions about a medical condition or this instruction, always ask your healthcare professional. [address] any warranty or liability for your use of this information. Introducing [facility]! Blade Cabezas introduces [employer]Shear patient portal. Now you can access parts of your medical record, email your doctor's office, and request medication refills online.    
 
1. In your internet browser, go to [email or web address] 2. Click on the First Time User? Click Here link in the Sign In box. You will see the New Member Sign Up page. 3. Enter your mo9 (moKredit) Access Code exactly as it appears below. You will not need to use this code after youve completed the sign-up process. If you do not sign up before the expiration date, you must request a new code. · mo9 (moKredit) Access Code: [email or web address] Expires: 4/9/2018 10:52 AM 
 
4. Enter the last four digits of your Social Security Number (xxxx) and Date of Birth (mm/dd/yyyy) as indicated and click Submit. You will be taken to the next sign-up page. 5. Create a mo9 (moKredit) ID. This will be your mo9 (moKredit) login ID and cannot be changed, so think of one that is secure and easy to remember. 6. Create a mo9 (moKredit) password. You can change your password at any time. 7. Enter your Password Reset Question and Answer. This can be used at a later time if you forget your password. 8. Enter your e-mail address. You will receive e-mail notification when new information is available in [address] 9. Click Sign Up. You can now view and download portions of your medical record. 10. Click the Download Summary menu link to download a portable copy of your medical information. If you have questions, please visit the Frequently Asked Questions section of the mo9 (moKredit) website. Remember, mo9 (moKredit) is NOT to be used for urgent needs. For medical emergencies, dial 911. Now available from your iPhone and Android! Please provide this summary of care documentation to your next provider. Your primary care clinician is listed as Blanche Robertson. If you have any questions after today's visit, please call [phone].

## 2018-02-02 NOTE — PROGRESS NOTES
OFFICE NOTE    Conner Dawkins is a 75 y.o. female presenting today for office visit. 2/2/2018  1:56 PM      Chief Complaint   Patient presents with    Hip Pain     pt here with c/o right hip has noted sine Tuesday         HPI: Here today for complaints of right hip pain. PCP Blanche Barclay MD. Reports that she has been having right hip and right low back pain since Tuesday- no injuries or possible causes that she can think of. Current pain is 10/10. She states that she has been taking OTC medications such as Ibuprofen, ASA, and Tylenol without relief. Pain radiates some down back of right buttock and leg. Has had this before in the past- about 2 other times. She reports that she was seen at Urgent Care last time and given steroids which resolved the pain. No numbness/tingling, or urinary/bowel incontinence. Review of Systems   Constitutional: Negative for fever. Gastrointestinal: Negative for abdominal pain, constipation, diarrhea, nausea and vomiting. Genitourinary: Negative for difficulty urinating, dysuria, frequency and urgency. Musculoskeletal: Positive for arthralgias, back pain and gait problem. Negative for neck pain. Skin: Negative for rash. Neurological: Negative for weakness and numbness.          PHQ Screening   PHQ over the last two weeks 2/2/2018   Little interest or pleasure in doing things Not at all   Feeling down, depressed or hopeless Not at all   Total Score PHQ 2 0         History  Past Medical History:   Diagnosis Date    Arthritis     Blind left eye     [facility] cancer ([facility] [address]     Glaucoma     High cholesterol     Hip pain     Hypertension        Past Surgical History:   Procedure Laterality Date    HX MASTECTOMY Left        Social History     Social History    Marital status:      Spouse name: N/A    Number of children: N/A    Years of education: N/A     Occupational History    Retired      Social History Main Topics    Smoking status: Light Tobacco Smoker Types: Cigarettes    Smokeless tobacco: Never Used    Alcohol use Yes      Comment: occ    Drug use: Yes     Special: Prescription    Sexual activity: No     Other Topics Concern     Service No    Blood Transfusions No    Caffeine Concern No    Occupational Exposure No    Hobby Hazards No    Sleep Concern No    Stress Concern No    Weight Concern No    Special Diet No    Back Care No    Exercise No    Bike Helmet No    Seat Belt Yes    Self-Exams Yes     Social History Narrative       No Known Allergies    Current Outpatient Prescriptions   Medication Sig Dispense Refill    atorvastatin (LIPITOR) 80 mg tablet Take 1 Tab by mouth daily. 30 Tab 2    anastrozole (ARIMIDEX) 1 mg tablet TK 1 T PO  QD  0    COMBIGAN 0.2-0.5 % drop ophthalmic solution INSTILL 1 DROP DAILY IN RIGHT EYE  3    albuterol (VENTOLIN HFA) 90 mcg/actuation inhaler Take  by inhalation.  amLODIPine (NORVASC) 5 mg tablet Take 1 Tab by mouth daily. 1    cetirizine (ZYRTEC) 10 mg tablet Take 1 Tab by mouth daily as needed for Allergies. 0    cyclobenzaprine (FLEXERIL) 10 mg tablet Take 1 Tab by mouth three (3) times daily as needed for Muscle Spasm(s). 0    aspirin delayed-release 81 mg tablet Take 1 Tab by mouth daily. 2         Patient Care Team:  Patient Care Team:  Luanne Casey MD as PCP - General (Family Practice)  Caro Burgos MD (Ophthalmology)  Latoya Cuevas MD (Surgery)  Cornel Carlisle MD (Hematology and Oncology)        LABS:  None new to review    RADIOLOGY:    LUMBAR SPINE [facility]  Result Impression   IMPRESSION:    Stable multilevel spondylosis but negative for fracture or traumatic subluxation of the lumbar spine. Result Narrative   EXAM: Lumbar spine x-ray. INDICATION: Lower back pain following trauma. COMPARISON: 09/18/2015.     TECHNIQUE: 5 views of the lumbar spine were obtained.    _______________    FINDINGS:    There is no evidence of acute fracture of the lumbar spine. Vertebral body heights are maintained. There is no subluxation. Intervertebral disc high loss of the lower lumbar spine is similar to prior imaging. There is no evidence of pars deformity. Sacroiliac joints are unremarkable. There is atherosclerosis of the abdominal aorta. Physical Exam   Constitutional: She is oriented to person, place, and time. She appears well-developed and well-nourished. +appears uncomfortable   Pulmonary/Chest: Effort normal. No respiratory distress. Musculoskeletal:        Right hip: She exhibits decreased range of motion, decreased strength and tenderness. She exhibits no swelling and no deformity. Lumbar back: She exhibits decreased range of motion and pain. She exhibits no swelling and no deformity. Legs:  -slightly decreased ROM and pain right hip and low back due to reports of pain  -bilateral arm and  strength 5/5  -right leg strength 4/5 at hip  -left leg strength 5/5 at hip  -straight leg raise positive right side   Neurological: She is alert and oriented to person, place, and time. She exhibits normal muscle tone. Coordination normal.   +limping gait   Skin: Skin is warm and dry. Psychiatric: Her speech is normal and behavior is normal. Her mood appears not anxious. She does not exhibit a depressed mood. Vitals:    02/02/18 1346   BP: 119/70   Pulse: 68   Resp: 20   Temp: 97.9 °F (36.6 °C)   SpO2: 97%   Weight: 124 lb (56.2 kg)   Height: 5' 4\" (1.626 m)   PainSc:  10 - Worst pain ever         Assessment and Plan    Acute right-sided low back pain with right-sided sciatica/ Sciatica of right side/ Spondylosis of lumbar spine  *Discussed with patient about symptoms- noted xray lumbar spine with DDD, which most likely is a contributing factor. Advised on possible spine specialist evaluation and/or PT in the future if it continues to recur.    *At this time, recommend to do Medrol dosepack with PRN heat and ice. She reports that she has muscle relaxer at home. Advised on PRN Motrin or Tylenol for mild to moderate pain. Rx given for short term supply of Tylenol #3 for severe pain. - methylPREDNISolone (MEDROL DOSEPACK) 4 mg tablet; Take as directed  Dispense: 1 Dose Pack; Refill: 0  - acetaminophen-codeine (TYLENOL #3) 300-30 mg per tablet; Take 1 Tab by mouth every eight (8) hours as needed for Pain. Max Daily Amount: 3 Tabs. Dispense: 10 Tab; Refill: 0      *Plan of care reviewed with patient. Patient in agreement with plan and expresses understanding. All questions answered and patient encouraged to call or RTO if further questions or concerns. Follow-up Disposition:  Return if symptoms worsen or fail to improve.

## 2018-02-12 ENCOUNTER — OFFICE VISIT (OUTPATIENT)
Dept: FAMILY MEDICINE CLINIC | Age: 68
End: 2018-02-12

## 2018-02-12 VITALS
TEMPERATURE: 97.8 F | RESPIRATION RATE: 18 BRPM | OXYGEN SATURATION: 99 % | DIASTOLIC BLOOD PRESSURE: 69 MMHG | HEART RATE: 70 BPM | WEIGHT: 125 LBS | BODY MASS INDEX: 21.34 KG/M2 | SYSTOLIC BLOOD PRESSURE: 119 MMHG | HEIGHT: 64 IN

## 2018-02-12 DIAGNOSIS — M25.551 PAIN OF RIGHT HIP JOINT: ICD-10-CM

## 2018-02-12 DIAGNOSIS — G89.29 CHRONIC GLUTEAL PAIN: Primary | ICD-10-CM

## 2018-02-12 DIAGNOSIS — M79.18 CHRONIC GLUTEAL PAIN: Primary | ICD-10-CM

## 2018-02-12 DIAGNOSIS — I10 ESSENTIAL HYPERTENSION: ICD-10-CM

## 2018-02-12 DIAGNOSIS — E78.5 DYSLIPIDEMIA: ICD-10-CM

## 2018-02-12 RX ORDER — DICLOFENAC SODIUM 50 MG/1
50 TABLET, DELAYED RELEASE ORAL
Qty: 60 TAB | Refills: 0 | Status: SHIPPED | OUTPATIENT
Start: 2018-02-12 | End: 2018-05-09

## 2018-02-12 NOTE — PROGRESS NOTES
Chief Complaint   Patient presents with    Hypertension    Cholesterol Problem    Hip Pain     Patient in today for htn, cholesterol and hip pain. 1. Have you been to the ER, urgent care clinic since your last visit? Hospitalized since your last visit? No    2. Have you seen or consulted any other health care providers outside of the [address] [place] [address] since your last visit? Include any pap smears or colon screening. No     HPI  Bradley Zabala comes in for follow-up care. Pain: Patient has chronic right hip and gluteal area pain. This has been ongoing for a long time. We have in the past given her Medrol Dosepak and the Tylenol without much relief. She has also used various over-the-counter medications. Pain comes on sitting and at times with walking. Pain is mainly around the gluteal and sacral area. She feels it more also when she lays in a particular position. Pain does radiate to the hip and to the lower extremity. We did do an x-ray of the hip and pelvis. There is likely degenerative joint disease of the hip but I will await the radiologist read. The meantime I will have her take diclofenac 50 mg up to twice a day as needed for the pain. I will refer her to the orthopedic clinic to evaluate for any other abnormality including bursitis. Hypertension: Patient is on amlodipine 5 mg daily. Her blood pressure is stable. We will continue with the current treatment plan. Dyslipidemia: Patient takes atorvastatin. However LDL level is elevated and we have increased this to 80 mg daily. I will check lipid panel  in 2-3 months.     Past Medical History  Past Medical History:   Diagnosis Date    Arthritis     Blind left eye     Breast cancer ([facility] [address]     Glaucoma     High cholesterol     Hip pain     Hypertension        Surgical History  Past Surgical History:   Procedure Laterality Date    HX MASTECTOMY Left         Medications  Current Outpatient Prescriptions   Medication Sig Dispense Refill    diclofenac EC (VOLTAREN) 50 mg EC tablet Take 1 Tab by mouth two (2) times daily as needed. 60 Tab 0    atorvastatin (LIPITOR) 80 mg tablet Take 1 Tab by mouth daily. 30 Tab 2    anastrozole (ARIMIDEX) 1 mg tablet TK 1 T PO  QD  0    COMBIGAN 0.2-0.5 % drop ophthalmic solution INSTILL 1 DROP DAILY IN RIGHT EYE  3    albuterol (VENTOLIN HFA) 90 mcg/actuation inhaler Take  by inhalation.  amLODIPine (NORVASC) 5 mg tablet Take 1 Tab by mouth daily. 1    aspirin delayed-release 81 mg tablet Take 1 Tab by mouth daily. 2    cetirizine (ZYRTEC) 10 mg tablet Take 1 Tab by mouth daily as needed for Allergies. 0    cyclobenzaprine (FLEXERIL) 10 mg tablet Take 1 Tab by mouth three (3) times daily as needed for Muscle Spasm(s). 0       Allergies  No Known Allergies    Family History  No family history on file.     Social History  Social History     Social History    Marital status:      Spouse name: N/A    Number of children: N/A    Years of education: N/A     Occupational History    Retired      Social History Main Topics    Smoking status: Light Tobacco Smoker     Types: Cigarettes    Smokeless tobacco: Never Used    Alcohol use Yes      Comment: occ    Drug use: Yes     Special: Prescription    Sexual activity: No     Other Topics Concern     Service No    Blood Transfusions No    Caffeine Concern No    Occupational Exposure No    Hobby Hazards No    Sleep Concern No    Stress Concern No    Weight Concern No    Special Diet No    Back Care No    Exercise No    Bike Helmet No    Seat Belt Yes    Self-Exams Yes     Social History Narrative       Review of Systems  Review of Systems - History obtained from chart review and the patient  General ROS: positive for  - fatigue and malaise  Psychological ROS: positive for - mood swings  ENT ROS: negative  Allergy and Immunology ROS: negative  Respiratory ROS: no cough, shortness of breath, or wheezing  Cardiovascular ROS: no chest pain or dyspnea on exertion  Gastrointestinal ROS: no abdominal pain, change in bowel habits, or black or bloody stools  Genito-Urinary ROS: no dysuria, trouble voiding, or hematuria  Musculoskeletal ROS: positive for - Pain in right gluteal and sacral area and also the right hip. Neurological ROS: negative    Vital Signs  Visit Vitals    /69 (BP 1 Location: Right arm, BP Patient Position: Sitting)    Pulse 70    Temp 97.8 °F (36.6 °C) (Oral)    Resp 18    Ht 5' 4\" (1.626 m)    Wt 125 lb (56.7 kg)    SpO2 99%    BMI 21.46 kg/m2         Physical Exam  Physical Examination: General appearance - acyanotic, in no respiratory distress, well hydrated and chronically ill appearing  Mental status - alert, oriented to person, place, and time, affect appropriate to mood  Chest - clear to auscultation, no wheezes, rales or rhonchi, symmetric air entry, no tachypnea, retractions or cyanosis  Heart - S1 and S2 normal  Back exam - limited range of motion  Musculoskeletal -patient has point tenderness around the sacrum and a gluteal area to palpation. Mild discomfort to internal rotation at the hip joint. Normal flexion and extension. No erythema or redness. Extremities - no pedal edema noted, intact peripheral pulses    Results  Results for orders placed or performed during the hospital encounter of 01/09/18   CBC WITH AUTOMATED DIFF   Result Value Ref Range    WBC 6.4 4.6 - 13.2 K/uL    RBC 4.33 4.20 - 5.30 M/uL    HGB 12.3 12.0 - 16.0 g/dL    HCT 37.9 35.0 - 45.0 %    MCV 87.5 74.0 - 97.0 FL    MCH 28.4 24.0 - 34.0 PG    MCHC 32.5 31.0 - 37.0 g/dL    RDW 14.0 11.6 - 14.5 %    PLATELET 715 333 - 961 K/uL    MPV 9.4 9.2 - 11.8 FL    NEUTROPHILS 47 40 - 73 %    LYMPHOCYTES 44 21 - 52 %    MONOCYTES 7 3 - 10 %    EOSINOPHILS 2 0 - 5 %    BASOPHILS 0 0 - 2 %    ABS. NEUTROPHILS 3.0 1.8 - 8.0 K/UL    ABS. LYMPHOCYTES 2.8 0.9 - 3.6 K/UL    ABS. MONOCYTES 0.4 0.05 - 1.2 K/UL    ABS.  EOSINOPHILS 0.1 0.0 - 0.4 K/UL    ABS. BASOPHILS 0.0 0.0 - 0.06 K/UL    DF AUTOMATED     LIPID PANEL   Result Value Ref Range    LIPID PROFILE          Cholesterol, total 265 (H) <200 MG/DL    Triglyceride 116 <150 MG/DL    HDL Cholesterol 74 (H) 40 - 60 MG/DL    LDL, calculated 167.8 (H) 0 - 100 MG/DL    VLDL, calculated 23.2 MG/DL    CHOL/HDL Ratio 3.6 0 - 5.0     METABOLIC PANEL, COMPREHENSIVE   Result Value Ref Range    Sodium 142 136 - 145 mmol/L    Potassium 4.4 3.5 - 5.5 mmol/L    Chloride 105 100 - 108 mmol/L    CO2 28 21 - 32 mmol/L    Anion gap 9 3.0 - 18 mmol/L    Glucose 99 74 - 99 mg/dL    BUN 12 7.0 - 18 MG/DL    Creatinine 0.66 0.6 - 1.3 MG/DL    BUN/Creatinine ratio 18 12 - 20      GFR est AA >60 >60 ml/min/1.73m2    GFR est non-AA >60 >60 ml/min/1.73m2    Calcium 10.0 8.5 - 10.1 MG/DL    Bilirubin, total 0.8 0.2 - 1.0 MG/DL    ALT (SGPT) 19 13 - 56 U/L    AST (SGOT) 14 (L) 15 - 37 U/L    Alk. phosphatase 80 45 - 117 U/L    Protein, total 7.4 6.4 - 8.2 g/dL    Albumin 4.3 3.4 - 5.0 g/dL    Globulin 3.1 2.0 - 4.0 g/dL    A-G Ratio 1.4 0.8 - 1.7     HEPATITIS C AB   Result Value Ref Range    Hepatitis C virus Ab 0.02 <0.80 Index    Hep C  virus Ab Interp. NEGATIVE  NEG      Hep C  virus Ab comment             ASSESSMENT and PLAN    ICD-10-CM ICD-9-CM    1. Chronic gluteal pain M79.1 729.1 XR HIP RT W OR WO PELV 2-3 VWS    G89.29 338.29 REFERRAL TO ORTHOPEDICS      diclofenac EC (VOLTAREN) 50 mg EC tablet   2. Pain of right hip joint M25.551 719.45 XR HIP RT W OR WO PELV 2-3 VWS      REFERRAL TO ORTHOPEDICS      diclofenac EC (VOLTAREN) 50 mg EC tablet   3. Essential hypertension I10 401.9    4.  Dyslipidemia E78.5 272.4      reviewed diet, exercise and weight control  cardiovascular risk and specific lipid/LDL goals reviewed  reviewed medications and side effects in detail  use of aspirin to prevent MI and TIA's discussed  radiology results and schedule of future radiology studies reviewed with patient    I have discussed the diagnosis with the patient and the intended plan of care as seen in the above orders. The patient has received an after-visit summary and questions were answered concerning future plans. I have discussed medication, side effects, and warnings with the patient in detail. The patient verbalized understanding and is in agreement with the plan of care. The patient will contact the office with any additional concerns.     Patricia Morton MD

## 2018-02-12 NOTE — MR AVS SNAPSHOT
[facility] 107 [address] [facility] 
[phone] Patient: David Marquez MRN: [id number] :1950 Visit Information Date & Time Provider Department Dept. Phone Encounter #  
 2018  1:15 PM Lele Mcclain. [address] [place] Daisy Santana [phone] [id number] Follow-up Instructions Return in about 1 month (around 3/12/2018), or if symptoms worsen or fail to improve, for hip and gluteal pain, dyslipidemia. Your Appointments 3/16/2018 11:00 AM  
COLON SCREEN with TSS HBV NURSE VISIT [place] [address] ([address]) Appt Note: ref from Mugaisi-cn screen; ref from [facility]ais[facility]-[employer] screen [address] [place] [address] Drive [facility] [id number] [place] [address] [address] [facility] Upcoming Health Maintenance Date Due DTaP/Tdap/Td series (1 - Tdap) 1970 BREAST CANCER SCRN MAMMOGRAM 1999 FOBT Q 1 YEAR AGE 50-75 1999 ZOSTER VACCINE AGE 60> 10/8/2009 OSTEOPOROSIS SCREENING (DEXA) 2014 Pneumococcal 65+ Low/Medium Risk (1 of 2 - PCV13) 2014 MEDICARE YEARLY EXAM 1/10/2019 GLAUCOMA SCREENING Q2Y 2020 Allergies as of 2018  Review Complete On: 2018 By: Negro Kaplan MD  
 No Known Allergies Current Immunizations  Never Reviewed Name Date Influenza High Dose Vaccine PF 2018 Not reviewed this visit You Were Diagnosed With   
  
 Codes Comments Chronic gluteal pain    -  Primary ICD-10-CM: M79.1, G89.29 ICD-9-CM: 729.1, 338.29 Pain of right hip joint     ICD-10-CM: M25.551 ICD-9-CM: 719.45 Vitals BP Pulse Temp Resp Height(growth percentile) Weight(growth percentile)  
 119/69 (BP 1 Location: Right arm, BP Patient Position: Sitting) 70 97.8 °F (36.6 °C) (Oral) 18 5' 4\" (1.626 m) 125 lb (56.7 kg) SpO2 BMI OB Status Smoking Status 99% 21.46 kg/m2 Menopause Light Tobacco Smoker BMI and BSA Data Body Mass Index Body Surface Area  
 21.46 kg/m 2 1.6 m 2 Preferred Pharmacy Pharmacy Name Phone [id number] - [address] [phone] Your Updated Medication List  
  
   
This list is accurate as of: 2/12/18  1:27 PM.  Always use your most recent med list. amLODIPine 5 mg tablet Commonly known as:  Lele Schulte Take 1 Tab by mouth daily. anastrozole 1 mg tablet Commonly known as:  ARIMIDEX TK 1 T PO  QD  
  
 aspirin delayed-release 81 mg tablet Take 1 Tab by mouth daily. atorvastatin 80 mg tablet Commonly known as:  LIPITOR Take 1 Tab by mouth daily. cetirizine 10 mg tablet Commonly known as:  ZYRTEC Take 1 Tab by mouth daily as needed for Allergies. COMBIGAN 0.2-0.5 % Drop ophthalmic solution Generic drug:  brimonidine-timolol INSTILL 1 DROP DAILY IN RIGHT EYE  
  
 cyclobenzaprine 10 mg tablet Commonly known as:  FLEXERIL Take 1 Tab by mouth three (3) times daily as needed for Muscle Spasm(s). diclofenac EC 50 mg EC tablet Commonly known as:  VOLTAREN Take 1 Tab by mouth two (2) times daily as needed. VENTOLIN HFA 90 mcg/actuation inhaler Generic drug:  albuterol Take  by inhalation. Prescriptions Sent to Pharmacy Refills  
 diclofenac EC (VOLTAREN) 50 mg EC tablet 0 Sig: Take 1 Tab by mouth two (2) times daily as needed. Class: Normal  
 Pharmacy: [id number] [address] #: [phone] Route: Oral  
  
We Performed the Following REFERRAL TO ORTHOPEDICS [ZGO434 Custom] Comments:  
 Right hip and gluteal pain Follow-up Instructions Return in about 1 month (around 3/12/2018), or if symptoms worsen or fail to improve, for hip and gluteal pain, dyslipidemia. To-Do List   
 02/12/2018 Imaging:  XR HIP RT W OR WO PELV 2-3 VWS Referral Information Referral ID Referred By Referred To  
  
 [id number] [facility], [address] [place], [place] [address] [place] [facility], [facility] [address] Phone: [phone] Fax: [phone] Visits Status Start Date End Date 1 New Request 2/12/18 2/12/19 If your referral has a status of pending review or denied, additional information will be sent to support the outcome of this decision. Introducing [facility] & HEALTH SERVICES! [employer] introduces [employer] patient portal. Now you can access parts of your medical record, email your doctor's office, and request medication refills online. 1. In your internet browser, go to [email or web address] 2. Click on the First Time User? Click Here link in the Sign In box. You will see the New Member Sign Up page. 3. Enter your [employer] Access Code exactly as it appears below. You will not need to use this code after youve completed the sign-up process. If you do not sign up before the expiration date, you must request a new code. · [employer] Access Code: [email or web address] Expires: 4/9/2018 10:52 AM 
 
4. Enter the last four digits of your Social Security Number (xxxx) and Date of Birth (mm/dd/yyyy) as indicated and click Submit. You will be taken to the next sign-up page. 5. Create a Memriset ID. This will be your [employer] login ID and cannot be changed, so think of one that is secure and easy to remember. 6. Create a [employer] password. You can change your password at any time. 7. Enter your Password Reset Question and Answer. This can be used at a later time if you forget your password. 8. Enter your e-mail address. You will receive e-mail notification when new information is available in [address] 9. Click Sign Up. You can now view and download portions of your medical record. 10. Click the Download Summary menu link to download a portable copy of your medical information. If you have questions, please visit the Frequently Asked Questions section of the [employer] website. Remember, [employer] is NOT to be used for urgent needs. For medical emergencies, dial 911. Now available from your iPhone and Android! Please provide this summary of care documentation to your next provider. Your primary care clinician is listed as Blanche Robertson. If you have any questions after today's visit, please call [phone].

## 2018-02-20 ENCOUNTER — OFFICE VISIT (OUTPATIENT)
Dept: ORTHOPEDIC SURGERY | Age: 68
End: 2018-02-20

## 2018-02-20 VITALS
OXYGEN SATURATION: 98 % | WEIGHT: 122.8 LBS | HEIGHT: 64 IN | DIASTOLIC BLOOD PRESSURE: 69 MMHG | BODY MASS INDEX: 20.96 KG/M2 | SYSTOLIC BLOOD PRESSURE: 136 MMHG | HEART RATE: 73 BPM | RESPIRATION RATE: 16 BRPM

## 2018-02-20 DIAGNOSIS — M51.9 LUMBAR DISC DISEASE: ICD-10-CM

## 2018-02-20 DIAGNOSIS — M54.31 SCIATICA OF RIGHT SIDE: Primary | ICD-10-CM

## 2018-02-20 NOTE — MR AVS SNAPSHOT
[address] [facility], [place] 
 
 
 [address] [facility] 
[phone] Patient: Nazia Gonzalez MRN: [id number] :1950 Visit Information Date & Time Provider Department Dept. Phone Encounter #  
 2018 10:15 AM Paxton Patel  [facility], [address] [facility] [place] [phone] [id number] Your Appointments 3/13/2018  1:00 PM  
ROUTINE CARE with Blanche Fan MD  
[address] ([facility]) Appt Note: for hip and gluteal pain, dyslipidemia [address] [facility]  
[phone]  
  
   
 [address]  
  
    
 3/16/2018 11:00 AM  
COLON SCREEN with TSS HBV NURSE VISIT [address] ([facility]) Appt Note: ref from Mugaisi-cn screen; ref from Mugaisi-cn screen [address] [place] [address] [id number] [address] [facility] Upcoming Health Maintenance Date Due DTaP/Tdap/Td series (1 - Tdap) 1970 BREAST CANCER SCRN MAMMOGRAM 1999 FOBT Q 1 YEAR AGE 50-75 1999 ZOSTER VACCINE AGE 60> 10/8/2009 OSTEOPOROSIS SCREENING (DEXA) 2014 Pneumococcal 65+ Low/Medium Risk (1 of 2 - PCV13) 2014 MEDICARE YEARLY EXAM 1/10/2019 GLAUCOMA SCREENING Q2Y 2020 Allergies as of 2018  Review Complete On: 2018 By: Paxton Patel MD  
 No Known Allergies Current Immunizations  Never Reviewed Name Date Influenza High Dose Vaccine PF 2018 Not reviewed this visit Vitals BP Pulse Resp Height(growth percentile) Weight(growth percentile) SpO2  
 136/69 (BP 1 Location: Right arm, BP Patient Position: Sitting) 73 16 5' 4\" (1.626 m) 122 lb 12.8 oz (55.7 kg) 98% BMI OB Status Smoking Status 21.08 kg/m2 Menopause Light Tobacco Smoker Vitals History BMI and BSA Data Body Mass Index Body Surface Area 21.08 kg/m 2 1.59 m 2 Preferred Pharmacy Pharmacy Name Phone [id number] [place] [phone] Your Updated Medication List  
  
   
This list is accurate as of: 2/20/18 10:38 AM.  Always use your most recent med list. amLODIPine 5 mg tablet Commonly known as:  Jesus Trang Take 1 Tab by mouth daily. anastrozole 1 mg tablet Commonly known as:  ARIMIDEX TK 1 T PO  QD  
  
 aspirin delayed-release 81 mg tablet Take 1 Tab by mouth daily. atorvastatin 80 mg tablet Commonly known as:  LIPITOR Take 1 Tab by mouth daily. cetirizine 10 mg tablet Commonly known as:  ZYRTEC Take 1 Tab by mouth daily as needed for Allergies. COMBIGAN 0.2-0.5 % Drop ophthalmic solution Generic drug:  brimonidine-timolol INSTILL 1 DROP DAILY IN RIGHT EYE  
  
 cyclobenzaprine 10 mg tablet Commonly known as:  FLEXERIL Take 1 Tab by mouth three (3) times daily as needed for Muscle Spasm(s). diclofenac EC 50 mg EC tablet Commonly known as:  VOLTAREN Take 1 Tab by mouth two (2) times daily as needed. VENTOLIN HFA 90 mcg/actuation inhaler Generic drug:  albuterol Take  by inhalation. Patient Instructions You should follow up PRN. If your condition worsens, please contact our office. [facility] & HEALTH SERVICES! [facility] introduces [employer] patient portal. Now you can access parts of your medical record, email your doctor's office, and request medication refills online. 1. In your internet browser, go to [email or web address] 2. Click on the First Time User? Click Here link in the Sign In box. You will see the New Member Sign Up page. 3. Enter your [employer] Access Code exactly as it appears below. You will not need to use this code after youve completed the sign-up process.  If you do not sign up before the expiration date, you must request a new code. · [employer] Access Code: [email or web address] Expires: 4/9/2018 10:52 AM 
 
4. Enter the last four digits of your Social Security Number (xxxx) and Date of Birth (mm/dd/yyyy) as indicated and click Submit. You will be taken to the next sign-up page. 5. Create a [employer] ID. This will be your [employer] login ID and cannot be changed, so think of one that is secure and easy to remember. 6. Create a [employer] password. You can change your password at any time. 7. Enter your Password Reset Question and Answer. This can be used at a later time if you forget your password. 8. Enter your e-mail address. You will receive e-mail notification when new information is available in [address] 9. Click Sign Up. You can now view and download portions of your medical record. 10. Click the Download Summary menu link to download a portable copy of your medical information. If you have questions, please visit the Frequently Asked Questions section of the [employer] website. Remember, [employer] is NOT to be used for urgent needs. For medical emergencies, dial 911. Now available from your iPhone and Android! Please provide this summary of care documentation to your next provider. Your primary care clinician is listed as Blanche Robertson. If you have any questions after today's visit, please call [phone].

## 2018-02-20 NOTE — PROGRESS NOTES
HISTORY OF PRESENT ILLNESS: Ms. Dwayne Garber is a 57-year-old female who is referred by her primary care physician for consultation regarding some pain towards her low back and right SI joint/sciatic notch region. She has had symptoms for a couple of months. When she saw her family physician last time they put her on Voltaren medication 50 mg bid. She is now taking it only as needed and she states it is helping her quite a bit. She specifically points to one area and points to the region of the right SI joint, but more in the region of the right sciatic notch. The pain does not radiate down her right leg. She has no history of trauma. She does not utilize any ambulatory assist.  She states it will occasionally wake her up at about 3 oclock in the morning. She denies paresthesias in the lower extremities. She denies bladder or bowel dysfunction. She did not complain of right groin pain. She has had AP pelvic x-rays done as well as a lateral of her right hip done recently which were reported as negative. PHYSICAL EXAMINATION:  Reveals a frail, thin, 57-year-old, -American female currently in minimal discomfort. She moves relatively easily on and off the examination table. She is alert and oriented x 3 and answers questions appropriately. With reference to her right hip she is able to straight leg raise 90º which does result in some pain in the right sciatic notch area, but does not cause radicular pain. She has a normal active and passive range of motion of the right hip without discomfort. Right hip roll test is negative. Jhonatans test is negative on the right side. She has symmetrical leg lengths in the supine position. Cross straight leg raise test is negative. She has a normal passive and active range of motion of the left hip without discomfort. Jhonatans test is negative on the left side.       Her point of maximal tenderness is more in the region of the right sciatic notch area not over the right SI joint. She does not have tenderness over the right greater trochanter. She does not have pain with resisted abduction of the right hip. RADIOGRAPHS:  Previous x-rays of her pelvis and hip reveal her hip to be fine. There is no significant arthritis of the right hip. She does have evidence of degenerative disc disease of her lower lumbar spine on the [facility] pelvic x-ray. IMPRESSION:   1. Right sciatica. 2. Degenerative disc disease, lumbar spine. RECOMMENDATIONS:  Treatment at this point remains symptomatic and conservative. At this point she is happy with the relief she has gotten from the Voltaren medication. I have asked her to continue with using it just on an as-needed basis but not taking it more than twice a day and she should take it with food. At this point she is comfortable with her lifestyle. If her symptoms become worse in the future consideration will be made for a course of physical therapy and possible spine referral if necessary. All of her questions were answered today. She will return to see us on a prn basis.                    Vitals:    02/20/18 1023   BP: 136/69   Pulse: 73   Resp: 16   SpO2: 98%   Weight: 55.7 kg (122 lb 12.8 oz)   Height: 5' 4\" (1.626 m)   PainSc:   3   PainLoc: Hip       Patient Active Problem List   Diagnosis Code    History of breast cancer Z85.3    H/O left mastectomy Z90.12    Dyslipidemia E78.5    Essential hypertension I10    Pain of right hip joint M25.551    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    Blind left eye H54.40     Patient Active Problem List    Diagnosis Date Noted    History of breast cancer 01/09/2018    H/O left mastectomy 01/09/2018    Dyslipidemia 01/09/2018    Essential hypertension 01/09/2018    Pain of right hip joint 01/09/2018    Glaucoma of right eye 01/09/2018    Blind left eye 01/09/2018     Current Outpatient Prescriptions   Medication Sig Dispense Refill    diclofenac EC (VOLTAREN) 50 mg EC tablet Take 1 Tab by mouth two (2) times daily as needed. 60 Tab 0    atorvastatin (LIPITOR) 80 mg tablet Take 1 Tab by mouth daily. 30 Tab 2    anastrozole (ARIMIDEX) 1 mg tablet TK 1 T PO  QD  0    COMBIGAN 0.2-0.5 % drop ophthalmic solution INSTILL 1 DROP DAILY IN RIGHT EYE  3    albuterol (VENTOLIN HFA) 90 mcg/actuation inhaler Take  by inhalation.  amLODIPine (NORVASC) 5 mg tablet Take 1 Tab by mouth daily. 1    aspirin delayed-release 81 mg tablet Take 1 Tab by mouth daily. 2    cetirizine (ZYRTEC) 10 mg tablet Take 1 Tab by mouth daily as needed for Allergies. 0    cyclobenzaprine (FLEXERIL) 10 mg tablet Take 1 Tab by mouth three (3) times daily as needed for Muscle Spasm(s).   0     No Known Allergies  Past Medical History:   Diagnosis Date    Arthritis     Blind left eye     Breast cancer ([address]     Glaucoma     High cholesterol     Hip pain     Hypertension      Past Surgical History:   Procedure Laterality Date    HX MASTECTOMY Left      Family History   Problem Relation Age of Onset    Diabetes Mother     Diabetes Father      Social History   Substance Use Topics    Smoking status: Light Tobacco Smoker     Types: Cigarettes    Smokeless tobacco: Never Used    Alcohol use Yes      Comment: occ

## 2018-03-16 ENCOUNTER — OFFICE VISIT (OUTPATIENT)
Dept: SURGERY | Age: 68
End: 2018-03-16

## 2018-03-16 VITALS
HEART RATE: 71 BPM | HEIGHT: 64 IN | RESPIRATION RATE: 16 BRPM | BODY MASS INDEX: 20.49 KG/M2 | OXYGEN SATURATION: 98 % | WEIGHT: 120 LBS

## 2018-03-16 DIAGNOSIS — Z12.11 COLON CANCER SCREENING: Primary | ICD-10-CM

## 2018-03-16 NOTE — MR AVS SNAPSHOT
[address] [place] [address] 240 200 [facility] 
[phone] Patient: Mary Souza MRN: [id number] :1950 Visit Information Date & Time Provider Department Dept. Phone Encounter #  
 3/16/2018 11:00 AM TSS [facility] NURSE VISIT Leatha Kellogg [facility] [phone] [id number] Upcoming Health Maintenance Date Due DTaP/Tdap/Td series (1 - Tdap) 1970 BREAST CANCER SCRN MAMMOGRAM 1999 FOBT Q 1 YEAR AGE 50-75 1999 ZOSTER VACCINE AGE 60> 10/8/2009 Bone Densitometry (Dexa) Screening 2014 Pneumococcal 65+ Low/Medium Risk (1 of 2 - PCV13) 2014 MEDICARE YEARLY EXAM 1/10/2019 GLAUCOMA SCREENING Q2Y 2020 Allergies as of 3/16/2018  Review Complete On: 2018 By: [address] [facility],[address], [place]  
 No Known Allergies Current Immunizations  Never Reviewed Name Date Influenza High Dose Vaccine PF 2018 Not reviewed this visit Vitals Pulse Resp Height(growth percentile) Weight(growth percentile) SpO2 BMI  
 71 16 5' 4\" (1.626 m) 120 lb (54.4 kg) 98% 20.6 kg/m2 OB Status Smoking Status Menopause Light Tobacco Smoker Vitals History BMI and BSA Data Body Mass Index Body Surface Area  
 20.6 kg/m 2 1.57 m 2 Preferred Pharmacy Pharmacy Name Phone [id number] - [address] [phone] Your Updated Medication List  
  
   
This list is accurate as of 3/16/18 11:39 AM.  Always use your most recent med list. amLODIPine 5 mg tablet Commonly known as:  Mara Hensen Take 1 Tab by mouth daily. anastrozole 1 mg tablet Commonly known as:  ARIMIDEX TK 1 T PO  QD  
  
 aspirin delayed-release 81 mg tablet Take 1 Tab by mouth daily. atorvastatin 80 mg tablet Commonly known as:  LIPITOR Take 1 Tab by mouth daily. cetirizine 10 mg tablet Commonly known as:  ZYRTEC Take 1 Tab by mouth daily as needed for Allergies. COMBIGAN 0.2-0.5 % Drop ophthalmic solution Generic drug:  brimonidine-timolol INSTILL 1 DROP DAILY IN RIGHT EYE  
  
 cyclobenzaprine 10 mg tablet Commonly known as:  FLEXERIL Take 1 Tab by mouth three (3) times daily as needed for Muscle Spasm(s). diclofenac EC 50 mg EC tablet Commonly known as:  VOLTAREN Take 1 Tab by mouth two (2) times daily as needed. VENTOLIN HFA 90 mcg/actuation inhaler Generic drug:  albuterol Take  by inhalation. Introducing [facility]! [facility] introduces [employer] Mapper patient portal. Now you can access parts of your medical record, email your doctor's office, and request medication refills online. 1. In your internet browser, go to [email or web address] 2. Click on the First Time User? Click Here link in the Sign In box. You will see the New Member Sign Up page. 3. Enter your Order Mapper Access Code exactly as it appears below. You will not need to use this code after youve completed the sign-up process. If you do not sign up before the expiration date, you must request a new code. · Order Mapper Access Code: [email or web address] Expires: 4/9/2018 11:52 AM 
 
4. Enter the last four digits of your Social Security Number (xxxx) and Date of Birth (mm/dd/yyyy) as indicated and click Submit. You will be taken to the next sign-up page. 5. Create a [employer] ID. This will be your Order Mapper login ID and cannot be changed, so think of one that is secure and easy to remember. 6. Create a Order Mapper password. You can change your password at any time. 7. Enter your Password Reset Question and Answer. This can be used at a later time if you forget your password. 8. Enter your e-mail address. You will receive e-mail notification when new information is available in [address] 9. Click Sign Up. You can now view and download portions of your medical record. 10. Click the Download Summary menu link to download a portable copy of your medical information. If you have questions, please visit the Frequently Asked Questions section of the [employer] website. Remember, [employer] is NOT to be used for urgent needs. For medical emergencies, dial 911. Now available from your iPhone and Android! Please provide this summary of care documentation to your next provider. Your primary care clinician is listed as Blanche Robertson. If you have any questions after today's visit, please call [phone].

## 2018-03-16 NOTE — PROGRESS NOTES
Review of Systems   Constitutional: Positive for diaphoresis. Negative for chills, fever, malaise/fatigue and weight loss. Night sweats   HENT: Negative. Eyes: Positive for blurred vision. Negative for double vision, photophobia, pain, discharge and redness. Blind in left eye   Respiratory: Positive for wheezing. Negative for cough, hemoptysis, sputum production and shortness of breath. Cardiovascular: Negative. Gastrointestinal: Negative. Genitourinary: Negative. Musculoskeletal: Negative. Skin: Negative. Neurological: Negative. Negative for weakness. Endo/Heme/Allergies: Negative. Psychiatric/Behavioral: Negative. Colon Screen    Patient: Marilin Carrera MRN: [id number]  SSN: [id number]    YOB: 1950  Age: 75 y.o. Sex: female        Subjective:   Marilin Carrera was referred by her PCP, Antoine Gomez MD.  Patient referred for colonoscopy for   Screening colonoscopy. Patient denies rectal pain or bleeding. Abdominal surgeries as described below, specifically none. Family history as described below, specifically sister with colon polyps. Last colonoscopy was 10 years, patient doesn't recall who performed this.     No Known Allergies    Past Medical History:   Diagnosis Date    Arthritis     Blind left eye     [facility] [address] [address]     Glaucoma     High cholesterol     Hip pain     Hypertension      Past Surgical History:   Procedure Laterality Date    HX COLONOSCOPY  2008    hx of polyps    HX MASTECTOMY Left     VASCULAR SURGERY PROCEDURE UNLIST Bilateral     vein cleaning to help with nerves      Family History   Problem Relation Age of Onset    Diabetes Mother     Diabetes Father     Colon Polyps Sister      Social History   Substance Use Topics    Smoking status: Light Tobacco Smoker     Types: Cigarettes    Smokeless tobacco: Never Used    Alcohol use Yes      Comment: occ      Prior to Admission medications    Medication [employer] Date End Date Taking? Authorizing Provider   diclofenac EC (VOLTAREN) 50 mg EC tablet Take 1 Tab by mouth two (2) times daily as needed. 2/12/18  Yes Blanche Robertson MD   atorvastatin (LIPITOR) 80 mg tablet Take 1 Tab by mouth daily. 1/11/18  Yes Blanche Robertson MD   anastrozole (ARIMIDEX) 1 mg tablet TK 1 T PO  QD 12/6/17  Yes Historical Provider   COMBIGAN 0.2-0.5 % drop ophthalmic solution INSTILL 1 DROP DAILY IN RIGHT EYE 12/14/17  Yes Historical Provider   albuterol (VENTOLIN HFA) 90 mcg/actuation inhaler Take  by inhalation. Yes Historical Provider   amLODIPine (NORVASC) 5 mg tablet Take 1 Tab by mouth daily. 1/9/18  Yes Blanche Aj MD   aspirin delayed-release 81 mg tablet Take 1 Tab by mouth daily. 1/9/18  Yes Blanche Robertson MD   cetirizine (ZYRTEC) 10 mg tablet Take 1 Tab by mouth daily as needed for Allergies. 1/9/18  Yes Blanche Robertson MD   cyclobenzaprine (FLEXERIL) 10 mg tablet Take 1 Tab by mouth three (3) times daily as needed for Muscle Spasm(s). 1/9/18  Yes Blanche Aj MD          Review of Systems:      Risks colonoscopy described- colon injury, missed lesion, anesthesia problems, bleeding       Pattie Delvalle, LPN  March 19, 9875  4:12 PM

## 2018-05-08 ENCOUNTER — ANESTHESIA EVENT (OUTPATIENT)
Dept: ENDOSCOPY | Age: 68
End: 2018-05-08
Payer: MEDICARE

## 2018-05-09 ENCOUNTER — HOSPITAL ENCOUNTER (OUTPATIENT)
Age: 68
Setting detail: OUTPATIENT SURGERY
Discharge: HOME | End: 2018-05-09
Attending: COLON & RECTAL SURGERY | Admitting: COLON & RECTAL SURGERY
Payer: MEDICARE

## 2018-05-09 ENCOUNTER — ANESTHESIA (OUTPATIENT)
Dept: ENDOSCOPY | Age: 68
End: 2018-05-09
Payer: MEDICARE

## 2018-05-09 VITALS
WEIGHT: 123 LBS | RESPIRATION RATE: 14 BRPM | BODY MASS INDEX: 20.49 KG/M2 | TEMPERATURE: 97.6 F | DIASTOLIC BLOOD PRESSURE: 58 MMHG | SYSTOLIC BLOOD PRESSURE: 117 MMHG | HEART RATE: 61 BPM | HEIGHT: 65 IN | OXYGEN SATURATION: 100 %

## 2018-05-09 PROCEDURE — 76060000031 HC ANESTHESIA FIRST 0.5 HR: Performed by: COLON & RECTAL SURGERY

## 2018-05-09 PROCEDURE — 77030011640 HC PAD GRND REM COVD -A: Performed by: COLON & RECTAL SURGERY

## 2018-05-09 PROCEDURE — 74011250636 HC RX REV CODE- 250/636

## 2018-05-09 PROCEDURE — 77030009426 HC FCPS BIOP ENDOSC BSC -B: Performed by: COLON & RECTAL SURGERY

## 2018-05-09 PROCEDURE — 74011250636 HC RX REV CODE- 250/636: Performed by: NURSE ANESTHETIST, CERTIFIED REGISTERED

## 2018-05-09 PROCEDURE — 74011000250 HC RX REV CODE- 250: Performed by: NURSE ANESTHETIST, CERTIFIED REGISTERED

## 2018-05-09 PROCEDURE — 76040000019: Performed by: COLON & RECTAL SURGERY

## 2018-05-09 PROCEDURE — 88305 TISSUE EXAM BY PATHOLOGIST: CPT | Performed by: COLON & RECTAL SURGERY

## 2018-05-09 RX ORDER — SODIUM CHLORIDE, SODIUM LACTATE, POTASSIUM CHLORIDE, CALCIUM CHLORIDE 600; 310; 30; 20 MG/100ML; MG/100ML; MG/100ML; MG/100ML
INJECTION, SOLUTION INTRAVENOUS
Status: DISCONTINUED | OUTPATIENT
Start: 2018-05-09 | End: 2018-05-09 | Stop reason: HOSPADM

## 2018-05-09 RX ORDER — PROPOFOL 10 MG/ML
INJECTION, EMULSION INTRAVENOUS AS NEEDED
Status: DISCONTINUED | OUTPATIENT
Start: 2018-05-09 | End: 2018-05-09 | Stop reason: HOSPADM

## 2018-05-09 RX ORDER — SODIUM CHLORIDE 0.9 % (FLUSH) 0.9 %
5-10 SYRINGE (ML) INJECTION AS NEEDED
Status: DISCONTINUED | OUTPATIENT
Start: 2018-05-09 | End: 2018-05-09 | Stop reason: HOSPADM

## 2018-05-09 RX ORDER — SODIUM CHLORIDE 0.9 % (FLUSH) 0.9 %
5-10 SYRINGE (ML) INJECTION EVERY 8 HOURS
Status: DISCONTINUED | OUTPATIENT
Start: 2018-05-09 | End: 2018-05-09 | Stop reason: HOSPADM

## 2018-05-09 RX ORDER — SODIUM CHLORIDE, SODIUM LACTATE, POTASSIUM CHLORIDE, CALCIUM CHLORIDE 600; 310; 30; 20 MG/100ML; MG/100ML; MG/100ML; MG/100ML
75 INJECTION, SOLUTION INTRAVENOUS CONTINUOUS
Status: DISCONTINUED | OUTPATIENT
Start: 2018-05-09 | End: 2018-05-09 | Stop reason: HOSPADM

## 2018-05-09 RX ADMIN — PROPOFOL 100 MG: 10 INJECTION, EMULSION INTRAVENOUS at 14:01

## 2018-05-09 RX ADMIN — SODIUM CHLORIDE, SODIUM LACTATE, POTASSIUM CHLORIDE, CALCIUM CHLORIDE: 600; 310; 30; 20 INJECTION, SOLUTION INTRAVENOUS at 14:01

## 2018-05-09 RX ADMIN — FAMOTIDINE 20 MG: 10 INJECTION INTRAVENOUS at 13:25

## 2018-05-09 RX ADMIN — SODIUM CHLORIDE, SODIUM LACTATE, POTASSIUM CHLORIDE, AND CALCIUM CHLORIDE 75 ML/HR: 600; 310; 30; 20 INJECTION, SOLUTION INTRAVENOUS at 13:25

## 2018-05-09 NOTE — H&P
HPI: Elba Duenas is a 75 y.o. female presenting with chief complain of need for colorectal cancer screening. Pt has hx polyps. Past Medical History:   Diagnosis Date    Arthritis     Blind left eye     Breast cancer ([address]     Glaucoma     High cholesterol     Hip pain     Hypertension        Past Surgical History:   Procedure Laterality Date    HX COLONOSCOPY  2008    hx of polyps    HX MASTECTOMY Left     VASCULAR SURGERY PROCEDURE UNLIST Bilateral     vein cleaning to help with nerves       Family History   Problem Relation Age of Onset    Diabetes Mother     Diabetes Father     Colon Polyps Sister        Social History     Social History    Marital status:      Spouse name: N/A    Number of children: N/A    Years of education: N/A     Occupational History    Retired      Social History Main Topics    Smoking status: Light Tobacco Smoker     Types: Cigarettes    Smokeless tobacco: Never Used    Alcohol use Yes      Comment: occ    Drug use: Yes     Special: Prescription    Sexual activity: No     Other Topics Concern     Service No    Blood Transfusions No    Caffeine Concern No    Occupational Exposure No    Hobby Hazards No    Sleep Concern No    Stress Concern No    Weight Concern No    Special Diet No    Back Care No    Exercise No    Bike Helmet No    Seat Belt Yes    Self-Exams Yes     Social History Narrative       Review of Systems - negative    Outpatient Prescriptions Marked as Taking for the 5/9/18 encounter [facility] Encounter)   Medication Sig Dispense Refill    atorvastatin (LIPITOR) 80 mg tablet Take 1 Tab by mouth daily. 30 Tab 2    anastrozole (ARIMIDEX) 1 mg tablet TK 1 T PO  QD  0    COMBIGAN 0.2-0.5 % drop ophthalmic solution INSTILL 1 DROP DAILY IN RIGHT EYE  3    albuterol (VENTOLIN HFA) 90 mcg/actuation inhaler Take  by inhalation.  amLODIPine (NORVASC) 5 mg tablet Take 1 Tab by mouth daily.   1    aspirin delayed-release 81 mg tablet Take 1 Tab by mouth daily. 2    cetirizine (ZYRTEC) 10 mg tablet Take 1 Tab by mouth daily as needed for Allergies. 0       No Known Allergies    Vitals:    05/09/18 1306   BP: 126/57   Pulse: 65   Resp: 16   Temp: 98.5 °F (36.9 °C)   SpO2: 100%   Weight: 55.8 kg (123 lb)   Height: 5' 5\" (1.651 m)       Physical Exam   Constitutional: She appears well-developed and well-nourished. HENT:   Head: Normocephalic and atraumatic. Eyes: Conjunctivae and EOM are normal.   Abdominal: Soft. She exhibits no distension. There is no tenderness. Musculoskeletal: Normal range of motion. Lymphadenopathy:     She has no cervical adenopathy. Right: No inguinal adenopathy present. Left: No inguinal adenopathy present. Neurological: She exhibits normal muscle tone. Skin: Skin is warm and dry. No rash noted. Psychiatric: She has a normal mood and affect. Her speech is normal.       Assessment / Plan    colonoscopy    The diagnoses and plan were discussed with the patient. All questions answered. Plan of care agreed to by all concerned.

## 2018-05-09 NOTE — DISCHARGE INSTRUCTIONS
From Dr. Campbell Chan: FOLLOW UP VISIT Appointment in: Other (Specify) No aspirin or ibuprofen (e.g. Aleve, Motrin, Advil) for 5 days. Repeat colonoscopy in 5 years. Colonoscopy: What to Expect at [address] [place] [address]  After you have a colonoscopy, you will stay at the clinic for 1 to 2 hours until the medicines wear off. Then you can go home. But you will need to arrange for a ride. Your doctor will tell you when you can eat and do your other usual activities. Your doctor will talk to you about when you will need your next colonoscopy. Your doctor can help you decide how often you need to be checked. This will depend on the results of your test and your risk for colorectal cancer. After the test, you may be bloated or have gas pains. You may need to pass gas. If a biopsy was done or a polyp was removed, you may have streaks of blood in your stool (feces) for a few days. This care sheet gives you a general idea about how long it will take for you to recover. But each person recovers at a different pace. Follow the steps below to get better as quickly as possible. How can you care for yourself at home? Activity  · Rest when you feel tired. · You can do your normal activities when it feels okay to do so. Diet  · Follow your doctor's directions for eating. · Unless your doctor has told you not to, drink plenty of fluids. This helps to replace the fluids that were lost during the colon prep. · Do not drink alcohol. Medicines  · If polyps were removed or a biopsy was done during the test, your doctor may tell you not to take aspirin or other anti-inflammatory medicines for a few days. These include ibuprofen (Advil, Motrin) and naproxen (Aleve). Other instructions  · For your safety, do not drive or operate machinery until the medicine wears off and you can think clearly.  Your doctor may tell you not to drive or operate machinery until the day after your test.  · Do not sign legal documents or make major decisions until the medicine wears off and you can think clearly. The anesthesia can make it hard for you to fully understand what you are agreeing to. Follow-up care is a key part of your treatment and safety. Be sure to make and go to all appointments, and call your doctor if you are having problems. It's also a good idea to know your test results and keep a list of the medicines you take. When should you call for help? Call 911 anytime you think you may need emergency care. For example, call if:  · You passed out (lost consciousness). · You pass maroon or bloody stools. · You have severe belly pain. Call your doctor now or seek immediate medical care if:  · Your stools are black and tarlike. · Your stools have streaks of blood, but you did not have a biopsy or any polyps removed. · You have belly pain, or your belly is swollen and firm. · You vomit. · You have a fever. · You are very dizzy. Watch closely for changes in your health, and be sure to contact your doctor if you have any problems. Where can you learn more? Go to [employer]  Enter E264 in the search box to learn more about \"Colonoscopy: What to Expect at Home. \"   © 5234-2777 [employer]. Care instructions adapted under license by [address] [place] [employer] (which disclaims liability or warranty for this information). This care instruction is for use with your licensed healthcare professional. If you have questions about a medical condition or this instruction, always ask your healthcare professional. [place] [address] any warranty or liability for your use of this information. Content Version: 29.6.947754; Current as of: November 14, 2014     Colon Polyps: Care Instructions  Your Care Instructions    Colon polyps are growths in the colon or the rectum. The cause of most colon polyps is not known, and most people who get them do not have any problems. But a certain kind can turn into cancer.  For this reason, regular testing for colon polyps is important for people age 48 and older and anyone who has an increased risk for colon cancer. Polyps are usually found through routine colon cancer screening tests. Although most colon polyps are not cancerous, they are usually removed and then tested for cancer. Screening for colon cancer saves lives because the cancer can usually be cured if it is caught early. If you have a polyp that is the type that can turn into cancer, you may need more tests to examine your entire colon. The doctor will remove any other polyps that he or she finds, and you will be tested more often. Follow-up care is a key part of your treatment and safety. Be sure to make and go to all appointments, and call your doctor if you are having problems. It's also a good idea to know your test results and keep a list of the medicines you take. How can you care for yourself at home? Regular exams to look for colon polyps are the best way to prevent polyps from turning into colon cancer. These can include stool tests, sigmoidoscopy, colonoscopy, and CT colonography. Talk with your doctor about a testing schedule that is right for you. To prevent polyps  There is no home treatment that can prevent colon polyps. But these steps may help lower your risk for cancer. · Stay active. Being active can help you get to and stay at a healthy weight. Try to exercise on most days of the week. Walking is a good choice. · Eat well. Choose a variety of vegetables, fruits, legumes (such as peas and beans), fish, poultry, and whole grains. · Do not smoke. If you need help quitting, talk to your doctor about stop-smoking programs and medicines. These can increase your chances of quitting for good. · If you drink alcohol, limit how much you drink. Limit alcohol to 2 drinks a day for men and 1 drink a day for women. When should you call for help?   Call your doctor now or seek immediate medical care if:  ? · You have severe belly pain. ? · Your stools are maroon or very bloody. ? Watch closely for changes in your health, and be sure to contact your doctor if:  ? · You have a fever. ? · You have nausea or vomiting. ? · You have a change in bowel habits (new constipation or diarrhea). ? · Your symptoms get worse or are not improving as expected. Where can you learn more? Go to http://lady-per.info/. Enter 95 [id number] in the search box to learn more about \"Colon Polyps: Care Instructions. \"  Current as of: May 12, 2017  Content Version: 11.4  © 5948-7748 [employer]. Care instructions adapted under license by [employer] (which disclaims liability or warranty for this information). If you have questions about a medical condition or this instruction, always ask your healthcare professional. [address] any warranty or liability for your use of this information. DISCHARGE SUMMARY from Nurse     POST-PROCEDURE INSTRUCTIONS:    Call your Physician if you:  ? Observe any excess bleeding. ? Develop a temperature over 100.5o F.  ? Experience abdominal, shoulder or chest pain. ? Notice any signs of decreased circulation or nerve impairment to an extremity such as a change in color, persistent numbness, tingling, coldness or increase in pain. ? Vomit blood or you have nausea and vomiting lasting longer than 4 hours. ? Are unable to take medications. ? Are unable to urinate within 8 hours after discharge following general anesthesia or intravenous sedation. For the next 24 hours after receiving general anesthesia or intravenous sedation, or while taking prescription Narcotics, limit your activities:  ? Do NOT drive a motor vehicle, operate hazard machinery or power tools, or perform tasks that require coordination. The medication you received during your procedure may have some effect on your mental awareness.   ? Do NOT make important personal or business decisions. The medication you received during your procedure may have some effect on your mental awareness. ? Do NOT drink alcoholic beverages. These drinks do not mix well with the medications that have been given to you. ? Upon discharge from the hospital, you must be accompanied by a responsible adult. ? Resume your diet as directed by your physician. ? Resume medications as your physician has prescribed. ? Please give a list of your current medications to your Primary Care Provider. ? Please update this list whenever your medications are discontinued, doses are changed, or new medications (including over-the-counter products) are added. ? Please carry medication information at all times in case of emergency situations. These are general instructions for a healthy lifestyle:    No smoking/ No tobacco products/ Avoid exposure to second hand smoke.  Surgeon General's Warning:  Quitting smoking now greatly reduces serious risk to your health. Obesity, smoking, and a sedentary lifestyle greatly increase your risk for illness.  A healthy diet, regular physical exercise & weight monitoring are important for maintaining a healthy lifestyle   You may be retaining fluid if you have a history of heart failure or if you experience any of the following symptoms:  Weight gain of 3 pounds or more overnight or 5 pounds in a week, increased swelling in our hands or feet or shortness of breath while lying flat in bed. Please call your doctor as soon as you notice any of these symptoms; do not wait until your next office visit. Recognize signs and symptoms of STROKE:  F  -  Face looks uneven  A  -  Arms unable to move or move unevenly  S  -  Speech slurred or non-existent  T  -  Time to call 911 - as soon as signs and symptoms begin - DO NOT go back to bed or wait to see If you get better - TIME IS BRAIN.     Colorectal Screening   Colorectal cancer almost always develops from precancerous polyps (abnormal growths) in the colon or rectum. Screening tests can find precancerous polyps, so that they can be removed before they turn into cancer. Screening tests can also find colorectal cancer early, when treatment works best.  [facility] Speak with your physician about when you should begin screening and how often you should be tested. Additional Information    If you have questions, please call [phone]. Remember, MyChart is NOT to be used for urgent needs. For medical emergencies, dial 911. Educational references and/or instructions provided during this visit included:    Colon Polyps      APPOINTMENTS:    Please make a follow-up appointment with your physician. Discharge information has been reviewed with the patient. The patient verbalized understanding.

## 2018-05-09 NOTE — IP AVS SNAPSHOT
[address] [facility] [address] 
 
 
 [address] [address] [address] [address] [phone] Patient: Phyllis Peck MRN: [id number] :1950 About your hospitalization You were admitted on:  May 9, 2018 You last received care in the:  [facility] You were discharged on:  May 9, 2018 Why you were hospitalized Your primary diagnosis was:  Not on File Follow-up Information Follow up With Details Comments Contact Info Farhana Pearl MD   [address] [facility] 200 [facility] 
[phone] MD Jessica Paz . [address] [place] [address] Primary Care [address] [place] [address] 
[phone] Discharge Orders None A check charlene indicates which time of day the medication should be taken. My Medications CONTINUE taking these medications Instructions Each Dose to Equal  
 Morning Noon Evening Bedtime  
 amLODIPine 5 mg tablet Commonly known as:  Fabienne Chimes Your last dose was: Your next dose is: Take 1 Tab by mouth daily. 5 mg  
    
   
   
   
  
 anastrozole 1 mg tablet Commonly known as:  ARIMIDEX Your last dose was: Your next dose is:    
   
   
 TK 1 T PO  QD  
     
   
   
   
  
 atorvastatin 80 mg tablet Commonly known as:  LIPITOR Your last dose was: Your next dose is: Take 1 Tab by mouth daily. 80 mg  
    
   
   
   
  
 cetirizine 10 mg tablet Commonly known as:  ZYRTEC Your last dose was: Your next dose is: Take 1 Tab by mouth daily as needed for Allergies. 10 mg  
    
   
   
   
  
 COMBIGAN 0.2-0.5 % Drop ophthalmic solution Generic drug:  brimonidine-timolol Your last dose was: Your next dose is:    
   
   
 INSTILL 1 DROP DAILY IN RIGHT EYE  
     
   
   
   
  
 cyclobenzaprine 10 mg tablet Commonly known as:  FLEXERIL Your last dose was: Your next dose is: Take 1 Tab by mouth three (3) times daily as needed for Muscle Spasm(s). 10 mg VENTOLIN HFA 90 mcg/actuation inhaler Generic drug:  albuterol Your last dose was: Your next dose is: Take  by inhalation. STOP taking these medications   
 aspirin delayed-release 81 mg tablet  
   
  
 diclofenac EC 50 mg EC tablet Commonly known as:  VOLTAREN Discharge Instructions From Dr. Brice Saenz: FOLLOW UP VISIT Appointment in: Other (Specify) No aspirin or ibuprofen (e.g. Aleve, Motrin, Advil) for 5 days. Repeat colonoscopy in 5 years. Colonoscopy: What to Expect at [facility] Your Recovery After you have a colonoscopy, you will stay at the clinic for 1 to 2 hours until the medicines wear off. Then you can go home. But you will need to arrange for a ride. Your doctor will tell you when you can eat and do your other usual activities. Your doctor will talk to you about when you will need your next colonoscopy. Your doctor can help you decide how often you need to be checked. This will depend on the results of your test and your risk for colorectal cancer. After the test, you may be bloated or have gas pains. You may need to pass gas. If a biopsy was done or a polyp was removed, you may have streaks of blood in your stool (feces) for a few days. This care sheet gives you a general idea about how long it will take for you to recover. But each person recovers at a different pace. Follow the steps below to get better as quickly as possible. How can you care for yourself at home? Activity · Rest when you feel tired. · You can do your normal activities when it feels okay to do so. Diet · Follow your doctor's directions for eating. · Unless your doctor has told you not to, drink plenty of fluids. This helps to replace the fluids that were lost during the colon prep. · Do not drink alcohol. Medicines · If polyps were removed or a biopsy was done during the test, your doctor may tell you not to take aspirin or other anti-inflammatory medicines for a few days. These include ibuprofen (Advil, Motrin) and naproxen (Aleve). Other instructions · For your safety, do not drive or operate machinery until the medicine wears off and you can think clearly. Your doctor may tell you not to drive or operate machinery until the day after your test. 
· Do not sign legal documents or make major decisions until the medicine wears off and you can think clearly. The anesthesia can make it hard for you to fully understand what you are agreeing to. Follow-up care is a key part of your treatment and safety. Be sure to make and go to all appointments, and call your doctor if you are having problems. It's also a good idea to know your test results and keep a list of the medicines you take. When should you call for help? Call 911 anytime you think you may need emergency care. For example, call if: 
· You passed out (lost consciousness). · You pass maroon or bloody stools. · You have severe belly pain. Call your doctor now or seek immediate medical care if: 
· Your stools are black and tarlike. · Your stools have streaks of blood, but you did not have a biopsy or any polyps removed. · You have belly pain, or your belly is swollen and firm. · You vomit. · You have a fever. · You are very dizzy. Watch closely for changes in your health, and be sure to contact your doctor if you have any problems. Where can you learn more? Go to [employer].be Enter E264 in the search box to learn more about \"Colonoscopy: What to Expect at Home. \"  
© 6396-9603 Healthwise, Incorporated. Care instructions adapted under license by [facility] (which disclaims liability or warranty for this information).  This care instruction is for use with your licensed healthcare professional. If you have questions about a medical condition or this instruction, always ask your healthcare professional. [address] any warranty or liability for your use of this information. Content Version: 21.3.615767; Current as of: November 14, 2014 Colon Polyps: Care Instructions Your Care Instructions Colon polyps are growths in the colon or the rectum. The cause of most colon polyps is not known, and most people who get them do not have any problems. But a certain kind can turn into cancer. For this reason, regular testing for colon polyps is important for people age 48 and older and anyone who has an increased risk for colon cancer. Polyps are usually found through routine colon cancer screening tests. Although most colon polyps are not cancerous, they are usually removed and then tested for cancer. Screening for colon cancer saves lives because the cancer can usually be cured if it is caught early. If you have a polyp that is the type that can turn into cancer, you may need more tests to examine your entire colon. The doctor will remove any other polyps that he or she finds, and you will be tested more often. Follow-up care is a key part of your treatment and safety. Be sure to make and go to all appointments, and call your doctor if you are having problems. It's also a good idea to know your test results and keep a list of the medicines you take. How can you care for yourself at home? Regular exams to look for colon polyps are the best way to prevent polyps from turning into colon cancer. These can include stool tests, sigmoidoscopy, colonoscopy, and CT colonography. Talk with your doctor about a testing schedule that is right for you. To prevent polyps There is no home treatment that can prevent colon polyps. But these steps may help lower your risk for cancer. · Stay active.  Being active can help you get to and stay at a healthy weight. Try to exercise on most days of the week. Walking is a good choice. · Eat well. Choose a variety of vegetables, fruits, legumes (such as peas and beans), fish, poultry, and whole grains. · Do not smoke. If you need help quitting, talk to your doctor about stop-smoking programs and medicines. These can increase your chances of quitting for good. · If you drink alcohol, limit how much you drink. Limit alcohol to 2 drinks a day for men and 1 drink a day for women. When should you call for help? Call your doctor now or seek immediate medical care if: 
? · You have severe belly pain. ? · Your stools are maroon or very bloody. ? Watch closely for changes in your health, and be sure to contact your doctor if: 
? · You have a fever. ? · You have nausea or vomiting. ? · You have a change in bowel habits (new constipation or diarrhea). ? · Your symptoms get worse or are not improving as expected. Where can you learn more? Go to http://lady-per.info/. Enter 95 [id number] in the search box to learn more about \"Colon Polyps: Care Instructions. \" Current as of: May 12, 2017 Content Version: 11.4 © 6368-3219 [employer]. Care instructions adapted under license by [employer] (which disclaims liability or warranty for this information). If you have questions about a medical condition or this instruction, always ask your healthcare professional. [place] [address] any warranty or liability for your use of this information. DISCHARGE SUMMARY from Nurse POST-PROCEDURE INSTRUCTIONS: 
 
Call your Physician if you: 
? Observe any excess bleeding. ? Develop a temperature over 100.5o F. 
? Experience abdominal, shoulder or chest pain. ? Notice any signs of decreased circulation or nerve impairment to an extremity such as a change in color, persistent numbness, tingling, coldness or increase in pain. ? Vomit blood or you have nausea and vomiting lasting longer than 4 hours. ? Are unable to take medications. ? Are unable to urinate within 8 hours after discharge following general anesthesia or intravenous sedation. For the next 24 hours after receiving general anesthesia or intravenous sedation, or while taking prescription Narcotics, limit your activities: 
? Do NOT drive a motor vehicle, operate hazard machinery or power tools, or perform tasks that require coordination. The medication you received during your procedure may have some effect on your mental awareness. ? Do NOT make important personal or business decisions. The medication you received during your procedure may have some effect on your mental awareness. ? Do NOT drink alcoholic beverages. These drinks do not mix well with the medications that have been given to you. ? Upon discharge from the hospital, you must be accompanied by a responsible adult. ? Resume your diet as directed by your physician. ? Resume medications as your physician has prescribed. ? Please give a list of your current medications to your Primary Care Provider. ? Please update this list whenever your medications are discontinued, doses are changed, or new medications (including over-the-counter products) are added. ? Please carry medication information at all times in case of emergency situations. These are general instructions for a healthy lifestyle: No smoking/ No tobacco products/ Avoid exposure to second hand smoke. ? Surgeon General's Warning:  Quitting smoking now greatly reduces serious risk to your health. Obesity, smoking, and a sedentary lifestyle greatly increase your risk for illness. ? A healthy diet, regular physical exercise & weight monitoring are important for maintaining a healthy lifestyle ?  You may be retaining fluid if you have a history of heart failure or if you experience any of the following symptoms:  Weight gain of 3 pounds or more overnight or 5 pounds in a week, increased swelling in our hands or feet or shortness of breath while lying flat in bed. Please call your doctor as soon as you notice any of these symptoms; do not wait until your next office visit. Recognize signs and symptoms of STROKE: 
F  -  Face looks uneven A  -  Arms unable to move or move unevenly S  -  Speech slurred or non-existent T  -  Time to call 911 - as soon as signs and symptoms begin - DO NOT go back to bed or wait to see If you get better - TIME IS BRAIN. Colorectal Screening ? Colorectal cancer almost always develops from precancerous polyps (abnormal growths) in the colon or rectum. Screening tests can find precancerous polyps, so that they can be removed before they turn into cancer. Screening tests can also find colorectal cancer early, when treatment works best. 
? Speak with your physician about when you should begin screening and how often you should be tested. Additional Information If you have questions, please call [phone]. Remember, Viamedia is NOT to be used for urgent needs. For medical emergencies, dial 911. Educational references and/or instructions provided during this visit included: 
 
Colon Polyps APPOINTMENTS: 
 
Please make a follow-up appointment with your physician. Discharge information has been reviewed with the patient. The patient verbalized understanding. Introducing [facility]! [employer] introduces Viamedia patient portal. Now you can access parts of your medical record, email your doctor's office, and request medication refills online. 1. In your internet browser, go to [email or web address] 2. Click on the First Time User? Click Here link in the Sign In box. You will see the New Member Sign Up page. 3. Enter your Viamedia Access Code exactly as it appears below.  You will not need to use this code after youve completed the sign-up process. If you do not sign up before the expiration date, you must request a new code. · Well[employer] Access Code: [email or web address] Expires: 8/6/2018  4:00 PM 
 
4. Enter the last four digits of your Social Security Number (xxxx) and Date of Birth (mm/dd/yyyy) as indicated and click Submit. You will be taken to the next sign-up page. 5. Create a Wellogix ID. This will be your Well[employer]ix login ID and cannot be changed, so think of one that is secure and easy to remember. 6. Create a [employer] password. You can change your password at any time. 7. Enter your Password Reset Question and Answer. This can be used at a later time if you forget your password. 8. Enter your e-mail address. You will receive e-mail notification when new information is available in [address]. 9. Click Sign Up. You can now view and download portions of your medical record. 10. Click the Download Summary menu link to download a portable copy of your medical information. If you have questions, please visit the Frequently Asked Questions section of the Wellogix website. Remember, Wellogix is NOT to be used for urgent needs. For medical emergencies, dial 911. Now available from your iPhone and Android! Introducing Fidencio Almeida As a [employer] patient, I wanted to make you aware of our electronic visit tool called Fidencio Almeida. [employer] 24/7 allows you to connect within minutes with a medical provider 24 hours a day, seven days a week via a mobile device or tablet or logging into a secure website from your computer. You can access Fidencio Almeida from anywhere in the [place].  
 
A virtual visit might be right for you when you have a simple condition and feel like you just dont want to get out of bed, or cant get away from work for an appointment, when your regular [employer] provider is not available (evenings, weekends or holidays), or when youre out of town and need minor care. Electronic visits cost only $49 and if the [employer] 24/7 provider determines a prescription is needed to treat your condition, one can be electronically transmitted to a nearby pharmacy*. Please take a moment to enroll today if you have not already done so. The enrollment process is free and takes just a few minutes. To enroll, please download the [employer] 24/7 geovanny to your tablet or phone, or visit [email or web address] to enroll on your computer. And, as an [address] [place] [address] patient with a [employer] account, the results of your visits will be scanned into your electronic medical record and your primary care provider will be able to view the scanned results. We urge you to continue to see your regular [employer] [employer] provider for your ongoing medical care. And while your primary care provider may not be the one available when you seek a [facility] virtual visit, the peace of mind you get from getting a real diagnosis real time can be priceless. For more information on [facility]io [facility], view our Frequently Asked Questions (FAQs) at [email or web address]. Sincerely, 
 
Raina Rose MD 
Chief Medical Officer [employer] *:  certain medications cannot be prescribed via Fidencio [facility] Providers Seen During Your Hospitalization Provider Specialty Primary office phone Leslie Cartwright MD Colon and Rectal Surgery [phone] Your Primary Care Physician (PCP) Primary Care Physician Office Phone Office Fax Sean Flores [phone] You are allergic to the following No active allergies Recent Documentation Height Weight BMI OB Status Smoking Status 1.651 m 55.8 kg 20.47 kg/m2 Menopause Light Tobacco Smoker Patient Belongings The following personal items are in your possession at time of discharge: 
  Dental Appliances: None  Visual Aid: Glasses Please provide this summary of care documentation to your next provider. Signatures-by signing, you are acknowledging that this After Visit Summary has been reviewed with you and you have received a copy. Patient Signature:  ____________________________________________________________ Date:  ____________________________________________________________  
  
[facility] Provider Signature:  ____________________________________________________________ Date:  ____________________________________________________________

## 2018-05-09 NOTE — PROCEDURES
[employer] [facility]  [address] [facility], [address]  [phone]                    Colonoscopy Procedure Note      Concepción Doctor  1950  [id number]                Date of Procedure: 5/9/2018    Preoperative diagnosis: Z12.11,  Colon cancer Screening    Postoperative diagnosis: Rectal polyp    :  Eitan Alfaro MD    Assistant(s): Endoscopy Technician-1: Amena Burleson  Endoscopy RN-1: Carolyne Gill RN    Sedation: MAC    Procedure Details:  Prior to the procedure, a history and physical were performed. The patients medications, allergies and sensitivities were reviewed and all questions were answered. After informed consent was obtained for the procedure, with all risks and benefits of procedure explained. The patient was taken to the endoscopy suite and placed in the left lateral decubitus position. Patient identification and proposed procedure were verified prior to the procedure by the nurse and I. Following sequential administration of sedation as per Anesthesia, a digital rectal exam was performed and was normal.  The Olympus video colonoscope was introduced through the anus and advanced to cecum, which was identified by the ileocecal valve and appendiceal orifice. The quality of preparation was excellent. The colonoscope was slowly withdrawn and the mucosa examined for any abnormalities. Cecal withdrawl time was greater than six minutes. The patient tolerated the procedure well. Findings/Interventions:   Polyps - #1, 5 mm in size, located in the rectum, removed by hot biopsy and sent for pathology    EBL: none    Recommendations: -Repeat colonoscopy in 5 years. NO aspirin for 5 days.      Discharge Disposition:  Keke Palma MD  5/9/2018  2:12 PM

## 2018-05-09 NOTE — ANESTHESIA POSTPROCEDURE EVALUATION
Post-Anesthesia Evaluation & Assessment    Visit Vitals    BP (!) 83/37    Pulse 100    Temp 36.9 °C (98.5 °F)    Resp 12    Ht 5' 5\" (1.651 m)    Wt 55.8 kg (123 lb)    SpO2 100%    BMI 20.47 kg/m2       Post-operative hydration adequate. Pain score (VAS): 0 Pain Scale 1: Numeric (0 - 10) (05/09/18 1306)  Pain Intensity 1: 0 (05/09/18 1306)   Managed. Mental status & Level of consciousness: returned to baseline    Neurological status: returned to baseline    Pulmonary status: airway patent, oxygen given as needed. Complications related to anesthesia: none    Patient has met all discharge requirements.     Additional comments:        Jean-Claude Kerr MD  May 9, 2018

## 2018-05-09 NOTE — ANESTHESIA PREPROCEDURE EVALUATION
Anesthetic History   No history of anesthetic complications            Review of Systems / Medical History  Patient summary reviewed and pertinent labs reviewed    Pulmonary  Within defined limits                 Neuro/Psych   Within defined limits           Cardiovascular    Hypertension: well controlled              Exercise tolerance: <4 METS     GI/Hepatic/Renal  Within defined limits              Endo/Other        Arthritis     Other Findings   Comments: Documentation of current medication  Current medications obtained, documented and obtained? YES      Risk Factors for Postoperative nausea/vomiting:       History of postoperative nausea/vomiting? NO       Female? YES       Motion sickness? NO       Intended opioid administration for postoperative analgesia? NO      Smoking Abstinence:  Current Smoker? NO  Elective Surgery? YES  Seen preoperatively by anesthesiologist or proxy prior to day of surgery? YES  Pt abstained from smoking 24 hours prior to anesthesia?  N/A    Preventive care/screening for High Blood Pressure:  Aged 18 years and older: YES  Screened for high blood pressure: YES  Patients with high blood pressure referred to primary care provider   for BP management: YES                 Physical Exam    Airway  Mallampati: II  TM Distance: 4 - 6 cm  Neck ROM: normal range of motion   Mouth opening: Normal     Cardiovascular  Regular rate and rhythm,  S1 and S2 normal,  no murmur, click, rub, or gallop             Dental    Dentition: Poor dentition     Pulmonary  Breath sounds clear to auscultation               Abdominal  GI exam deferred       Other Findings            Anesthetic Plan    ASA: 2  Anesthesia type: MAC          Induction: Intravenous  Anesthetic plan and risks discussed with: Patient

## 2018-05-17 ENCOUNTER — TELEPHONE (OUTPATIENT)
Dept: SURGERY | Age: 68
End: 2018-05-17

## 2018-05-17 NOTE — TELEPHONE ENCOUNTER
----- Message from Monroe Cheng MD sent at 5/14/2018  8:51 AM EDT -----  Benign polyp(s). Repeat colonoscopy in 5 years as planned. Attempted to call patient but phone number was not in service.

## 2018-10-17 ENCOUNTER — OFFICE VISIT (OUTPATIENT)
Dept: FAMILY MEDICINE CLINIC | Age: 68
End: 2018-10-17

## 2018-10-17 VITALS
TEMPERATURE: 98.7 F | HEART RATE: 62 BPM | HEIGHT: 65 IN | SYSTOLIC BLOOD PRESSURE: 133 MMHG | BODY MASS INDEX: 18.83 KG/M2 | WEIGHT: 113 LBS | OXYGEN SATURATION: 100 % | DIASTOLIC BLOOD PRESSURE: 68 MMHG | RESPIRATION RATE: 18 BRPM

## 2018-10-17 DIAGNOSIS — M47.816 SPONDYLOSIS OF LUMBAR SPINE: ICD-10-CM

## 2018-10-17 DIAGNOSIS — M25.551 PAIN OF RIGHT HIP JOINT: ICD-10-CM

## 2018-10-17 DIAGNOSIS — Z23 ENCOUNTER FOR IMMUNIZATION: ICD-10-CM

## 2018-10-17 DIAGNOSIS — I10 ESSENTIAL HYPERTENSION: Primary | ICD-10-CM

## 2018-10-17 DIAGNOSIS — R06.2 WHEEZE: ICD-10-CM

## 2018-10-17 DIAGNOSIS — E78.5 DYSLIPIDEMIA: ICD-10-CM

## 2018-10-17 RX ORDER — DICLOFENAC SODIUM 50 MG/1
50 TABLET, DELAYED RELEASE ORAL
Qty: 60 TAB | Refills: 3 | Status: SHIPPED | OUTPATIENT
Start: 2018-10-17 | End: 2018-10-17 | Stop reason: SDUPTHER

## 2018-10-17 RX ORDER — LOSARTAN POTASSIUM 100 MG/1
100 TABLET ORAL DAILY
Qty: 30 TAB | Refills: 3 | Status: SHIPPED | OUTPATIENT
Start: 2018-10-17 | End: 2019-01-18 | Stop reason: SDUPTHER

## 2018-10-17 RX ORDER — ALBUTEROL SULFATE 90 UG/1
2 AEROSOL, METERED RESPIRATORY (INHALATION)
Qty: 1 INHALER | Refills: 2 | Status: SHIPPED | OUTPATIENT
Start: 2018-10-17 | End: 2020-11-03 | Stop reason: SDUPTHER

## 2018-10-17 RX ORDER — ATORVASTATIN CALCIUM 80 MG/1
80 TABLET, FILM COATED ORAL DAILY
Qty: 30 TAB | Refills: 2 | Status: SHIPPED | OUTPATIENT
Start: 2018-10-17 | End: 2019-10-22 | Stop reason: SDUPTHER

## 2018-10-17 RX ORDER — CYCLOBENZAPRINE HCL 10 MG
10 TABLET ORAL
Qty: 30 TAB | Refills: 3 | Status: SHIPPED | OUTPATIENT
Start: 2018-10-17 | End: 2019-10-22 | Stop reason: SDUPTHER

## 2018-10-17 RX ORDER — DICLOFENAC SODIUM 50 MG/1
TABLET, DELAYED RELEASE ORAL
Qty: 180 TAB | Refills: 3 | Status: CANCELLED | OUTPATIENT
Start: 2018-10-17

## 2018-10-17 RX ORDER — CETIRIZINE HCL 10 MG
10 TABLET ORAL
Qty: 30 TAB | Refills: 3 | Status: SHIPPED | OUTPATIENT
Start: 2018-10-17 | End: 2019-10-22

## 2018-10-17 RX ORDER — AMLODIPINE BESYLATE 5 MG/1
5 TABLET ORAL DAILY
Qty: 30 TAB | Refills: 3 | Status: SHIPPED | OUTPATIENT
Start: 2018-10-17 | End: 2019-01-17 | Stop reason: DRUGHIGH

## 2018-10-17 NOTE — TELEPHONE ENCOUNTER
Nida from [facility] is requesting a 90 supply refill Diclofenac 50mg for this patient.  Please contact Nida if you have any questions

## 2018-10-17 NOTE — PROGRESS NOTES
Chief Complaint Patient presents with  Follow-up  
  chronic left hip pain  Medication Refill  
  refill request for Atrovastatin 80mg, Albuterol HFA 90mcg, Flexeril 10mg, and Zyrtec 10mg 1. Have you been to the ER, urgent care clinic since your last visit? Hospitalized since your last visit? Yes Urgent Care on September 18,2018 for left hip pain 2. Have you seen or consulted any other health care providers outside of the [address] [place] [address] since your last visit? Include any pap smears or colon screening. No  
 
HPI Darren Willingham comes in for follow-up care. Patient has chronic left hip pain. Pain comes with activity and also at rest.  She has been seen by the orthopedist.  Patient also has degenerative disc disease lumbar spine with sciatica symptoms. Patient has been using diclofenac and this helps with the pain. She is out of medication. Would like a refill of the same. Patient has dyslipidemia. She is on atorvastatin. Takes 80 mg daily. Requests refill of this. Prescription was sent in. She also has a history of COPD with occasional wheeze and cough. Currently with the fall season here she has noticed more wheezing or chest tightness. Would like a refill of albuterol. Prescription was sent in. I also did send in some Zantac for her to help with his sneezing and nasal congestion. Patient has taken Flexeril for low back pain and muscle spasm. Would request a refill of the same. Prescription was sent in. Patient has hypertension. Blood pressure is stable. She is on amlodipine. She does request a refill of medication. Prescription was sent in. Patient will get the flu vaccine today. Past Medical History Past Medical History:  
Diagnosis Date  Arthritis  Blind left eye  Breast cancer ([facility] [address]  Glaucoma  High cholesterol  Hip pain  Hypertension Surgical History Past Surgical History:  
Procedure Laterality Date  HX COLONOSCOPY  2008  
 hx of polyps  HX MASTECTOMY Left  VASCULAR SURGERY PROCEDURE UNLIST Bilateral   
 vein cleaning to help with nerves Medications Current Outpatient Medications Medication Sig Dispense Refill  atorvastatin (LIPITOR) 80 mg tablet Take 1 Tab by mouth daily. 30 Tab 2  
 anastrozole (ARIMIDEX) 1 mg tablet TK 1 T PO  QD  0  
 COMBIGAN 0.2-0.5 % drop ophthalmic solution INSTILL 1 DROP DAILY IN RIGHT EYE  3  
 albuterol (VENTOLIN HFA) 90 mcg/actuation inhaler Take  by inhalation.  amLODIPine (NORVASC) 5 mg tablet Take 1 Tab by mouth daily. 1  
 cetirizine (ZYRTEC) 10 mg tablet Take 1 Tab by mouth daily as needed for Allergies. 0  
 cyclobenzaprine (FLEXERIL) 10 mg tablet Take 1 Tab by mouth three (3) times daily as needed for Muscle Spasm(s). 0 Allergies No Known Allergies Family History Family History Problem Relation Age of Onset  Diabetes Mother  Diabetes Father  Colon Polyps Sister Social History Social History Socioeconomic History  Marital status:  Spouse name: Not on file  Number of children: Not on file  Years of education: Not on file  Highest education level: Not on file Social Needs  Financial resource strain: Not on file  Food insecurity - worry: Not on file  Food insecurity - inability: Not on file  Transportation needs - medical: Not on file  Transportation needs - non-medical: Not on file Occupational History  Occupation: Retired Tobacco Use  Smoking status: Light Tobacco Smoker Types: Cigarettes  Smokeless tobacco: Never Used Substance and Sexual Activity  Alcohol use: Yes Comment: occ  Drug use: Yes Types: Prescription  Sexual activity: No  
Other Topics Concern   Service No  
 Blood Transfusions No  
 Caffeine Concern No  
 Occupational Exposure No  
 Hobby Hazards No  
 Sleep Concern No  
 Stress Concern No  
  Weight Concern No  
 Special Diet No  
 Back Care No  
 Exercise No  
 Bike Helmet No  
 Seat Belt Yes  Self-Exams Yes Social History Narrative  Not on file Review of Systems Review of Systems -review of all systems negative except as noted above in the HPI. Vital Signs Visit Vitals /68 (BP 1 Location: Right arm, BP Patient Position: Sitting) Pulse 62 Temp 98.7 °F (37.1 °C) (Oral) Resp 18 Ht 5' 5\" (1.651 m) Wt 113 lb (51.3 kg) SpO2 100% BMI 18.80 kg/m² Physical Exam 
Physical Examination: General appearance - acyanotic, in no respiratory distress and chronically ill appearing Mental status - alert, oriented to person, place, and time Neck - supple, no significant adenopathy Lymphatics - no palpable lymphadenopathy Chest - no tachypnea, retractions or cyanosis Heart - normal rate and regular rhythm, S1 and S2 normal 
Back exam - limited range of motion Neurological - neck supple without rigidity Musculoskeletal - osteoarthritic changes noted in both hands Extremities - no pedal edema noted Results Results for orders placed or performed during the hospital encounter of 01/09/18 CBC WITH AUTOMATED DIFF Result Value Ref Range WBC 6.4 4.6 - 13.2 K/uL  
 RBC 4.33 4.20 - 5.30 M/uL  
 HGB 12.3 12.0 - 16.0 g/dL HCT 37.9 35.0 - 45.0 % MCV 87.5 74.0 - 97.0 FL  
 MCH 28.4 24.0 - 34.0 PG  
 MCHC 32.5 31.0 - 37.0 g/dL  
 RDW 14.0 11.6 - 14.5 % PLATELET 995 392 - 421 K/uL MPV 9.4 9.2 - 11.8 FL  
 NEUTROPHILS 47 40 - 73 % LYMPHOCYTES 44 21 - 52 % MONOCYTES 7 3 - 10 % EOSINOPHILS 2 0 - 5 % BASOPHILS 0 0 - 2 %  
 ABS. NEUTROPHILS 3.0 1.8 - 8.0 K/UL  
 ABS. LYMPHOCYTES 2.8 0.9 - 3.6 K/UL  
 ABS. MONOCYTES 0.4 0.05 - 1.2 K/UL  
 ABS. EOSINOPHILS 0.1 0.0 - 0.4 K/UL  
 ABS. BASOPHILS 0.0 0.0 - 0.06 K/UL  
 DF AUTOMATED    
LIPID PANEL Result Value Ref Range LIPID PROFILE  Cholesterol, total 265 (H) <200 MG/DL  
 Triglyceride 116 <150 MG/DL  
 HDL Cholesterol 74 (H) 40 - 60 MG/DL  
 LDL, calculated 167.8 (H) 0 - 100 MG/DL VLDL, calculated 23.2 MG/DL  
 CHOL/HDL Ratio 3.6 0 - 5.0 METABOLIC PANEL, COMPREHENSIVE Result Value Ref Range Sodium 142 136 - 145 mmol/L Potassium 4.4 3.5 - 5.5 mmol/L Chloride 105 100 - 108 mmol/L  
 CO2 28 21 - 32 mmol/L Anion gap 9 3.0 - 18 mmol/L Glucose 99 74 - 99 mg/dL BUN 12 7.0 - 18 MG/DL Creatinine 0.66 0.6 - 1.3 MG/DL  
 BUN/Creatinine ratio 18 12 - 20 GFR est AA >60 >60 ml/min/1.73m2 GFR est non-AA >60 >60 ml/min/1.73m2 Calcium 10.0 8.5 - 10.1 MG/DL Bilirubin, total 0.8 0.2 - 1.0 MG/DL  
 ALT (SGPT) 19 13 - 56 U/L  
 AST (SGOT) 14 (L) 15 - 37 U/L Alk. phosphatase 80 45 - 117 U/L Protein, total 7.4 6.4 - 8.2 g/dL Albumin 4.3 3.4 - 5.0 g/dL Globulin 3.1 2.0 - 4.0 g/dL A-G Ratio 1.4 0.8 - 1.7 HEPATITIS C AB Result Value Ref Range Hepatitis C virus Ab 0.02 <0.80 Index Hep C  virus Ab Interp. NEGATIVE  NEG Hep C  virus Ab comment ASSESSMENT and PLAN 
 
  ICD-10-CM ICD-9-CM 1. Essential hypertension I10 401.9 losartan (COZAAR) 100 mg tablet  
   amLODIPine (NORVASC) 5 mg tablet 2. Dyslipidemia E78.5 272.4 atorvastatin (LIPITOR) 80 mg tablet 3. Encounter for immunization Z23 V03.89 ADMIN INFLUENZA VIRUS VAC INFLUENZA VACCINE INACTIVATED (IIV), SUBUNIT, ADJUVANTED, IM  
   PNEUMOCOCCAL POLYSACCHARIDE VACCINE, 23-VALENT, ADULT OR IMMUNOSUPPRESSED PT DOSE, ADMIN PNEUMOCOCCAL VACCINE  
 
current treatment plan is effective, no change in therapy 
reviewed diet, exercise and weight control 
reviewed medications and side effects in detail I have discussed the diagnosis with the patient and the intended plan of care as seen in the above orders. The patient has received an after-visit summary and questions were answered concerning future plans.  I have discussed medication, side effects, and warnings with the patient in detail. The patient verbalized understanding and is in agreement with the plan of care. The patient will contact the office with any additional concerns.  
 
Emilia Art MD

## 2018-10-17 NOTE — PROGRESS NOTES
Alex Nix is a 75 y.o. female who presents for routine immunizations. She denies any symptoms , reactions or allergies that would exclude them from being immunized today. Risks and adverse reactions were discussed and the VIS was given to them. All questions were addressed. She was observed for 15 min post injection. There were no reactions observed.  
 
Lay Triplett LPN

## 2018-10-18 RX ORDER — DICLOFENAC SODIUM 50 MG/1
TABLET, DELAYED RELEASE ORAL
Qty: 180 TAB | Refills: 3 | Status: SHIPPED | OUTPATIENT
Start: 2018-10-18 | End: 2019-01-17 | Stop reason: SDUPTHER

## 2018-12-27 ENCOUNTER — DOCUMENTATION ONLY (OUTPATIENT)
Dept: FAMILY MEDICINE CLINIC | Age: 68
End: 2018-12-27

## 2019-01-17 ENCOUNTER — HOSPITAL ENCOUNTER (OUTPATIENT)
Dept: LAB | Age: 69
Discharge: HOME | End: 2019-01-17
Payer: COMMERCIAL

## 2019-01-17 ENCOUNTER — OFFICE VISIT (OUTPATIENT)
Dept: FAMILY MEDICINE CLINIC | Age: 69
End: 2019-01-17

## 2019-01-17 VITALS
RESPIRATION RATE: 18 BRPM | BODY MASS INDEX: 18.83 KG/M2 | SYSTOLIC BLOOD PRESSURE: 162 MMHG | HEIGHT: 65 IN | WEIGHT: 113 LBS | OXYGEN SATURATION: 100 % | HEART RATE: 72 BPM | TEMPERATURE: 98.1 F | DIASTOLIC BLOOD PRESSURE: 78 MMHG

## 2019-01-17 DIAGNOSIS — Z01.89 ENCOUNTER FOR LABORATORY TEST: ICD-10-CM

## 2019-01-17 DIAGNOSIS — M81.0 POST-MENOPAUSAL OSTEOPOROSIS: ICD-10-CM

## 2019-01-17 DIAGNOSIS — I10 ESSENTIAL HYPERTENSION: Primary | ICD-10-CM

## 2019-01-17 DIAGNOSIS — Z71.89 ACP (ADVANCE CARE PLANNING): ICD-10-CM

## 2019-01-17 DIAGNOSIS — J30.0 VASOMOTOR RHINITIS: ICD-10-CM

## 2019-01-17 DIAGNOSIS — I10 ESSENTIAL HYPERTENSION: ICD-10-CM

## 2019-01-17 DIAGNOSIS — Z00.00 ENCOUNTER FOR MEDICARE ANNUAL WELLNESS EXAM: ICD-10-CM

## 2019-01-17 LAB
ALBUMIN SERPL-MCNC: 4.1 G/DL (ref 3.4–5)
ALBUMIN/GLOB SERPL: 1.4 {RATIO} (ref 0.8–1.7)
ALP SERPL-CCNC: 89 U/L (ref 45–117)
ALT SERPL-CCNC: 22 U/L (ref 13–56)
ANION GAP SERPL CALC-SCNC: 9 MMOL/L (ref 3–18)
AST SERPL-CCNC: 16 U/L (ref 15–37)
BASOPHILS # BLD: 0 K/UL (ref 0–0.1)
BASOPHILS NFR BLD: 0 % (ref 0–2)
BILIRUB SERPL-MCNC: 1.4 MG/DL (ref 0.2–1)
BUN SERPL-MCNC: 12 MG/DL (ref 7–18)
BUN/CREAT SERPL: 21 (ref 12–20)
CALCIUM SERPL-MCNC: 9.8 MG/DL (ref 8.5–10.1)
CHLORIDE SERPL-SCNC: 106 MMOL/L (ref 100–108)
CHOLEST SERPL-MCNC: 194 MG/DL
CO2 SERPL-SCNC: 27 MMOL/L (ref 21–32)
CREAT SERPL-MCNC: 0.58 MG/DL (ref 0.6–1.3)
DIFFERENTIAL METHOD BLD: ABNORMAL
EOSINOPHIL # BLD: 0.1 K/UL (ref 0–0.4)
EOSINOPHIL NFR BLD: 2 % (ref 0–5)
ERYTHROCYTE [DISTWIDTH] IN BLOOD BY AUTOMATED COUNT: 13.4 % (ref 11.6–14.5)
GLOBULIN SER CALC-MCNC: 2.9 G/DL (ref 2–4)
GLUCOSE SERPL-MCNC: 98 MG/DL (ref 74–99)
HCT VFR BLD AUTO: 36.9 % (ref 35–45)
HDLC SERPL-MCNC: 84 MG/DL (ref 40–60)
HDLC SERPL: 2.3 {RATIO} (ref 0–5)
HGB BLD-MCNC: 12.1 G/DL (ref 12–16)
LDLC SERPL CALC-MCNC: 92.6 MG/DL (ref 0–100)
LIPID PROFILE,FLP: ABNORMAL
LYMPHOCYTES # BLD: 2.5 K/UL (ref 0.9–3.6)
LYMPHOCYTES NFR BLD: 37 % (ref 21–52)
MCH RBC QN AUTO: 29.3 PG (ref 24–34)
MCHC RBC AUTO-ENTMCNC: 32.8 G/DL (ref 31–37)
MCV RBC AUTO: 89.3 FL (ref 74–97)
MONOCYTES # BLD: 0.4 K/UL (ref 0.05–1.2)
MONOCYTES NFR BLD: 6 % (ref 3–10)
NEUTS SEG # BLD: 3.7 K/UL (ref 1.8–8)
NEUTS SEG NFR BLD: 55 % (ref 40–73)
PLATELET # BLD AUTO: 390 K/UL (ref 135–420)
PMV BLD AUTO: 9.2 FL (ref 9.2–11.8)
POTASSIUM SERPL-SCNC: 4.5 MMOL/L (ref 3.5–5.5)
PROT SERPL-MCNC: 7 G/DL (ref 6.4–8.2)
RBC # BLD AUTO: 4.13 M/UL (ref 4.2–5.3)
SODIUM SERPL-SCNC: 142 MMOL/L (ref 136–145)
TRIGL SERPL-MCNC: 87 MG/DL (ref ?–150)
VLDLC SERPL CALC-MCNC: 17.4 MG/DL
WBC # BLD AUTO: 6.7 K/UL (ref 4.6–13.2)

## 2019-01-17 PROCEDURE — 85025 COMPLETE CBC W/AUTO DIFF WBC: CPT

## 2019-01-17 PROCEDURE — 80061 LIPID PANEL: CPT

## 2019-01-17 PROCEDURE — 80053 COMPREHEN METABOLIC PANEL: CPT

## 2019-01-17 RX ORDER — AMLODIPINE BESYLATE 10 MG/1
TABLET ORAL
Qty: 90 TAB | Refills: 2 | Status: SHIPPED | OUTPATIENT
Start: 2019-01-17 | End: 2019-10-22 | Stop reason: SDUPTHER

## 2019-01-17 RX ORDER — FLUTICASONE PROPIONATE 50 MCG
SPRAY, SUSPENSION (ML) NASAL
Qty: 1 BOTTLE | Refills: 0 | Status: SHIPPED | OUTPATIENT
Start: 2019-01-17 | End: 2019-10-22

## 2019-01-17 RX ORDER — FLUTICASONE PROPIONATE 50 MCG
2 SPRAY, SUSPENSION (ML) NASAL DAILY
Qty: 1 BOTTLE | Refills: 0 | Status: SHIPPED | OUTPATIENT
Start: 2019-01-17 | End: 2019-01-17 | Stop reason: SDUPTHER

## 2019-01-17 RX ORDER — AMLODIPINE BESYLATE 10 MG/1
10 TABLET ORAL DAILY
Qty: 30 TAB | Refills: 2 | Status: SHIPPED | OUTPATIENT
Start: 2019-01-17 | End: 2019-01-17 | Stop reason: SDUPTHER

## 2019-01-17 RX ORDER — DICLOFENAC SODIUM 50 MG/1
TABLET, DELAYED RELEASE ORAL
Qty: 180 TAB | Refills: 0 | Status: SHIPPED | OUTPATIENT
Start: 2019-01-17 | End: 2019-02-20

## 2019-01-17 RX ORDER — ASPIRIN 81 MG/1
TABLET ORAL DAILY
COMMUNITY

## 2019-01-17 NOTE — PATIENT INSTRUCTIONS
Medicare Part B Preventive Services Limitations Recommendation Scheduled Bone Mass Measurement 
(age 72 & older, biennial) A bone mass density test is recommended when a woman turns 65 to screen for osteoporosis. This test is only recommended one time, as a screening. Some providers will use this same test as a disease monitoring tool if you already have osteoporosis. Due 
   
Cardiovascular Screening Blood Tests (every 5 years) Total cholesterol, HDL, Triglycerides and ECG Order blood work  as a panel if possible and adults with routine risk  an electrocardiogram (ECG) at intervals determined by your doctor. Due 
   
Colorectal Cancer Screening 
-Fecal occult blood test (annual) -Flexible sigmoidoscopy (5y) 
-Screening colonoscopy (10y) -Barium Enema Colorectal cancer screenings should be done for adults age 54-65 with no increased risk factors for colorectal cancer. There are a number of acceptable methods of screening for this type of cancer. Each test has its own benefits and drawbacks. Discuss with your doctor what is most appropriate for you during your annual wellness visit. The different tests include: colonoscopy (considered the best screening method), a fecal occult blood test, a fecal DNA test, and sigmoidoscopy. Due 05- Counseling to Prevent Tobacco Use (up to 8 sessions per year) - Counseling greater than 3 and up to 10 minutes - Counseling greater than 10 minutes Patients must be asymptomatic of tobacco-related conditions to receive as preventive service Diabetes Screening Tests (at least every 3 years, Medicare covers annually or at 6-month intervals for prediabetic patients) Fasting blood sugar (FBS) or glucose tolerance test (GTT) -All adults age 38-68 who are overweight should have a diabetes screening test once every three years.  
-Other screening tests and preventive services for persons with diabetes include: an eye exam to screen for diabetic retinopathy, a kidney function test, a foot exam, and stricter control over your cholesterol. n/a Diabetes Self-Management Training (DSMT) (no USPSTF recommendation) Requires referral by treating physician for patient with diabetes or renal disease. 10 hours of initial DSMT session of no less than 30 minutes each in a continuous 12-month period. 2 hours of follow-up DSMT in subsequent years. n/a Glaucoma Screening (no USPSTF recommendation) Diabetes mellitus, family history, , age [address] or over,  American, age 72 or over Due Human Immunodeficiency Virus (HIV) Screening (annually for increased risk patients) HIV-1 and HIV-2 by EIA, ANGELA, rapid antibody test, or oral mucosa transudate Patient must be at increased risk for HIV infection per USPSTF guidelines or pregnant. Tests covered annually for patients at increased risk. Pregnant patients may receive up to 3 test during pregnancy. n/a Medical Nutrition Therapy (MNT) (for diabetes or renal disease not recommended schedule) Requires referral by treating physician for patient with diabetes or renal disease. Can be provided in same year as diabetes self-management training (DSMT), and CMS recommends medical nutrition therapy take place after DSMT. Up to 3 hours for initial year and 2 hours in subsequent years. n/a Shingles Vaccination A shingles vaccine is also recommended once in a lifetime after age 61 Due Seasonal Influenza Vaccination (annually) All adults should have a flu vaccine yearly  Done 10- Pneumococcal Vaccination (once after 72) All adults over the age of 72 should receive the recommended pneumonia vaccines. Current USPSTF guidelines recommend a series of two vaccines for the best pneumonia protection. Due 
10- Hepatitis B Vaccinations (if medium/high risk) Medium/high risk factors:  End-stage renal disease, 
 Hemophiliacs who received Factor VIII or IX concentrates, Clients of institutions for the mentally retarded, Persons who live in the same house as a HepB virus carrier, Homosexual men, Illicit injectable drug abusers. n/a Screening Mammography (biennial age 54-69) Breast cancer screenings are recommended every other year for women of normal risk, age 54-69. Due 
   
Screening Pap Tests and Pelvic Examination (up to age 79 and after 79 if unknown history or abnormal study last 10 years) Cervical cancer screenings for women over age 72 are only recommended with certain risk factors Due Hepatitis C All adults born between [birth year] and 1965 should be screened once  Done 01- Tetanus  All adults should have a tetanus vaccine every 10 years Due

## 2019-01-17 NOTE — PROGRESS NOTES
NoChief Complaint Patient presents with [facility] Annual Wellness Visit   Follow-up Routine Care 1. Have you been to the ER, urgent care clinic since your last visit? Hospitalized since your last visit? No 
 
2. Have you seen or consulted any other health care providers outside of the [facility] since your last visit? Include any pap smears or colon screening. No 
This is the Subsequent Medicare Annual Wellness Exam, performed 12 months or more after the Initial AWV or the last Subsequent AWV I have reviewed the patient's medical history in detail and updated the computerized patient record. History Niya Sinclair comes in for Medicare wellness visit. Past Medical History:  
Diagnosis Date  Arthritis  Blind left eye  Breast cancer ([address]  Glaucoma  High cholesterol  Hip pain  Hypertension Past Surgical History:  
Procedure Laterality Date  COLONOSCOPY N/A 5/9/2018 COLONOSCOPY / polypectomy performed by Karon Love MD at [address] HX COLONOSCOPY  2008  
 hx of polyps  HX MASTECTOMY Left  VASCULAR SURGERY PROCEDURE UNLIST Bilateral   
 vein cleaning to help with nerves Current Outpatient Medications Medication Sig Dispense Refill  aspirin delayed-release 81 mg tablet Take  by mouth daily.  diclofenac EC (VOLTAREN) 50 mg EC tablet TAKE 1 TABLET BY MOUTH TWICE DAILY AS NEEDED 180 Tab 3  cetirizine (ZYRTEC) 10 mg tablet Take 1 Tab by mouth daily as needed for Allergies. 30 Tab 3  cyclobenzaprine (FLEXERIL) 10 mg tablet Take 1 Tab by mouth three (3) times daily as needed for Muscle Spasm(s). 30 Tab 3  
 albuterol (VENTOLIN HFA) 90 mcg/actuation inhaler Take 2 Puffs by inhalation every six (6) hours as needed for Wheezing. 1 Inhaler 2  
 losartan (COZAAR) 100 mg tablet Take 1 Tab by mouth daily. 30 Tab 3  
 atorvastatin (LIPITOR) 80 mg tablet Take 1 Tab by mouth daily.  30 Tab 2  
  amLODIPine (NORVASC) 5 mg tablet Take 1 Tab by mouth daily. 30 Tab 3  
 anastrozole (ARIMIDEX) 1 mg tablet TK 1 T PO  QD  0  
 COMBIGAN 0.2-0.5 % drop ophthalmic solution INSTILL 1 DROP DAILY IN RIGHT EYE  3 No Known Allergies Family History Problem Relation Age of Onset  Diabetes Mother  Diabetes Father  Colon Polyps Sister Social History Tobacco Use  Smoking status: Light Tobacco Smoker Types: Cigarettes  Smokeless tobacco: Never Used Substance Use Topics  Alcohol use: Yes Comment: occ Patient Active Problem List  
Diagnosis Code  History of breast cancer Z85.3  
 H/O left mastectomy Z90.12  Dyslipidemia E78.5  Essential hypertension I10  
 Pain of right hip joint M25.551  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  Blind left eye H54.40 Depression Risk Factor Screening: PHQ over the last two weeks 1/17/2019 Little interest or pleasure in doing things Not at all Feeling down, depressed, irritable, or hopeless Not at all Total Score PHQ 2 0 Alcohol Risk Factor Screening: You do not drink alcohol or very rarely. 1. Was the patient's timed Up and GO test unsteady or longer than 30 seconds? No 
2. Does the patient need help with the phone, transportation, shopping, preparing meals, housework, laundry, medications or managing money? Yes Grand-daughter helps out with cooking and cleaning 3. Does the patients' home have rugs in the hallway, lack grab bars in the bathroom, lack handrails on the stairs or have poor lighting? No 
4. Have you noticed any hearing difficulties? No 
Hearing Evaluation: 
 
Functional Ability and Level of Safety:  
Hearing Loss Hearing is good. Activities of Daily Living The home contains: no safety equipment. Patient does total self care Fall Risk Fall Risk Assessment, last 12 mths 1/17/2019 Able to walk? Yes Fall in past 12 months? No  
 
 
Abuse Screen Patient is not abused Cognitive Screening Evaluation of Cognitive Function: 
Has your family/caregiver stated any concerns about your memory: no 
Normal 
 
Patient Care Team  
Patient Care Team: 
Kendal Quevedo MD as PCP - General (Family Practice) Jeancarlos Adams MD (Ophthalmology) Giorgi Batista MD (Surgery) Ludy Prado MD (Hematology and Oncology) Assessment/Plan Education and counseling provided: 
Are appropriate based on today's review and evaluation End-of-Life planning (with patient's consent) 1. 1. Encounter for Medicare annual wellness exam 
 
2. ACP (advance care planning) Health Maintenance Due Topic Date Due  
 DTaP/Tdap/Td series (1 - Tdap) 12/08/1970  Shingrix Vaccine Age 50> (1 of 2) 12/08/1999  BREAST CANCER SCRN MAMMOGRAM  12/08/1999  Bone Densitometry (Dexa) Screening  12/08/2014  MEDICARE YEARLY EXAM  01/10/2019 I have discussed the diagnosis with the patient and the intended plan of care as seen in the above orders. The patient has received an after-visit summary and questions were answered concerning future plans. I have discussed medication, side effects, and warnings with the patient in detail. The patient verbalized understanding and is in agreement with the plan of care. The patient will contact the office with any additional concerns.  
 
Bolivar Christy MD

## 2019-01-17 NOTE — PROGRESS NOTES
YESEN[place] 
Lizy Lloyd comes in for follow-up care. Patient has hypertension. Blood pressure remains slightly elevated. She denies headache or changes in vision. She is on losartan and amlodipine. I will increase amlodipine to 10 mg daily. She will continue with the losartan 100 mg daily. Recheck in 1 month. Patient has nasal congestion and sneezing. She does have a lot of postnasal drainage that is clear. No fever or chills. No headache. She has been taking cetirizine. Discussed options. I will give Flonase. We will follow-up in a month to see how she is doing. Patient has a history of right shoulder arthritis. Does get pain on and off in the shoulder. She takes diclofenac for the pain. Would like a refill of medication. Past Medical History Past Medical History:  
Diagnosis Date  Arthritis  Blind left eye  Breast cancer ([facility] [address]  Glaucoma  High cholesterol  Hip pain  Hypertension Surgical History Past Surgical History:  
Procedure Laterality Date  COLONOSCOPY N/A 5/9/2018 COLONOSCOPY / polypectomy performed by Xochitl Londono MD at [address] [facility] HX COLONOSCOPY  2008  
 hx of polyps  HX MASTECTOMY Left  VASCULAR SURGERY PROCEDURE UNLIST Bilateral   
 vein cleaning to help with nerves Medications Current Outpatient Medications Medication Sig Dispense Refill  aspirin delayed-release 81 mg tablet Take  by mouth daily.  diclofenac EC (VOLTAREN) 50 mg EC tablet TAKE 1 TABLET BY MOUTH TWICE DAILY AS NEEDED 180 Tab 3  cetirizine (ZYRTEC) 10 mg tablet Take 1 Tab by mouth daily as needed for Allergies. 30 Tab 3  cyclobenzaprine (FLEXERIL) 10 mg tablet Take 1 Tab by mouth three (3) times daily as needed for Muscle Spasm(s). 30 Tab 3  
 albuterol (VENTOLIN HFA) 90 mcg/actuation inhaler Take 2 Puffs by inhalation every six (6) hours as needed for Wheezing.  1 Inhaler 2  
  losartan (COZAAR) 100 mg tablet Take 1 Tab by mouth daily. 30 Tab 3  
 atorvastatin (LIPITOR) 80 mg tablet Take 1 Tab by mouth daily. 30 Tab 2  
 amLODIPine (NORVASC) 5 mg tablet Take 1 Tab by mouth daily. 30 Tab 3  
 anastrozole (ARIMIDEX) 1 mg tablet TK 1 T PO  QD  0  
 COMBIGAN 0.2-0.5 % drop ophthalmic solution INSTILL 1 DROP DAILY IN RIGHT EYE  3 Allergies No Known Allergies Family History Family History Problem Relation Age of Onset  Diabetes Mother  Diabetes Father  Colon Polyps Sister Social History Social History Socioeconomic History  Marital status:  Spouse name: Not on file  Number of children: Not on file  Years of education: Not on file  Highest education level: Not on file Social Needs  Financial resource strain: Not on file  Food insecurity - worry: Not on file  Food insecurity - inability: Not on file  Transportation needs - medical: Not on file  Transportation needs - non-medical: Not on file Occupational History  Occupation: Retired Tobacco Use  Smoking status: Light Tobacco Smoker Types: Cigarettes  Smokeless tobacco: Never Used Substance and Sexual Activity  Alcohol use: Yes Comment: occ  Drug use: Yes Types: Prescription  Sexual activity: No  
Other Topics Concern   Service No  
 Blood Transfusions No  
 Caffeine Concern No  
 Occupational Exposure No  
 Hobby Hazards No  
 Sleep Concern No  
 Stress Concern No  
 Weight Concern No  
 Special Diet No  
 Back Care No  
 Exercise No  
 Bike Helmet No  
 Seat Belt Yes  Self-Exams Yes Social History Narrative  Not on file Review of Systems Review of Systems -review of all systems negative except as noted above in the HPI. Vital Signs Visit Vitals /78 (BP 1 Location: Right arm, BP Patient Position: Sitting) Pulse 72 Temp 98.1 °F (36.7 °C) (Oral) Resp 18 Ht 5' 5\" (1.651 m) Wt 113 lb (51.3 kg) SpO2 100% BMI 18.80 kg/m² Physical Exam 
Physical Examination: General appearance - oriented to person, place, and time and acyanotic, in no respiratory distress Mental status - alert, oriented to person, place, and time, affect appropriate to mood Mouth - mucous membranes moist, pharynx normal without lesions Neck - supple, no significant adenopathy Lymphatics - no palpable lymphadenopathy Chest - clear to auscultation, no wheezes, rales or rhonchi, symmetric air entry Heart - normal rate and regular rhythm, S1 and S2 normal 
Abdomen - no rebound tenderness noted Neurological - motor and sensory grossly normal bilaterally Musculoskeletal - osteoarthritic changes noted in both hands Extremities - no pedal edema noted, intact peripheral pulses Results Results for orders placed or performed during the hospital encounter of 01/09/18 CBC WITH AUTOMATED DIFF Result Value Ref Range WBC 6.4 4.6 - 13.2 K/uL  
 RBC 4.33 4.20 - 5.30 M/uL  
 HGB 12.3 12.0 - 16.0 g/dL HCT 37.9 35.0 - 45.0 % MCV 87.5 74.0 - 97.0 FL  
 MCH 28.4 24.0 - 34.0 PG  
 MCHC 32.5 31.0 - 37.0 g/dL  
 RDW 14.0 11.6 - 14.5 % PLATELET 851 760 - 788 K/uL MPV 9.4 9.2 - 11.8 FL  
 NEUTROPHILS 47 40 - 73 % LYMPHOCYTES 44 21 - 52 % MONOCYTES 7 3 - 10 % EOSINOPHILS 2 0 - 5 % BASOPHILS 0 0 - 2 %  
 ABS. NEUTROPHILS 3.0 1.8 - 8.0 K/UL  
 ABS. LYMPHOCYTES 2.8 0.9 - 3.6 K/UL  
 ABS. MONOCYTES 0.4 0.05 - 1.2 K/UL  
 ABS. EOSINOPHILS 0.1 0.0 - 0.4 K/UL  
 ABS. BASOPHILS 0.0 0.0 - 0.06 K/UL  
 DF AUTOMATED    
LIPID PANEL Result Value Ref Range LIPID PROFILE Cholesterol, total 265 (H) <200 MG/DL Triglyceride 116 <150 MG/DL  
 HDL Cholesterol 74 (H) 40 - 60 MG/DL  
 LDL, calculated 167.8 (H) 0 - 100 MG/DL VLDL, calculated 23.2 MG/DL  
 CHOL/HDL Ratio 3.6 0 - 5.0 METABOLIC PANEL, COMPREHENSIVE Result Value Ref Range  Sodium 142 136 - 145 mmol/L  
 Potassium 4.4 3.5 - 5.5 mmol/L Chloride 105 100 - 108 mmol/L  
 CO2 28 21 - 32 mmol/L Anion gap 9 3.0 - 18 mmol/L Glucose 99 74 - 99 mg/dL BUN 12 7.0 - 18 MG/DL Creatinine 0.66 0.6 - 1.3 MG/DL  
 BUN/Creatinine ratio 18 12 - 20 GFR est AA >60 >60 ml/min/1.73m2 GFR est non-AA >60 >60 ml/min/1.73m2 Calcium 10.0 8.5 - 10.1 MG/DL Bilirubin, total 0.8 0.2 - 1.0 MG/DL  
 ALT (SGPT) 19 13 - 56 U/L  
 AST (SGOT) 14 (L) 15 - 37 U/L Alk. phosphatase 80 45 - 117 U/L Protein, total 7.4 6.4 - 8.2 g/dL Albumin 4.3 3.4 - 5.0 g/dL Globulin 3.1 2.0 - 4.0 g/dL A-G Ratio 1.4 0.8 - 1.7 HEPATITIS C AB Result Value Ref Range Hepatitis C virus Ab 0.02 <0.80 Index Hep C  virus Ab Interp. NEGATIVE  NEG Hep C  virus Ab comment ASSESSMENT and PLAN 
  ICD-10-CM ICD-9-CM 1. Essential hypertension I10 401.9 CBC WITH AUTOMATED DIFF  
   METABOLIC PANEL, COMPREHENSIVE  
   LIPID PANEL 2. Vasomotor rhinitis J30.0 477.9 fluticasone (FLONASE) 50 mcg/actuation nasal spray 3. Encounter for Medicare annual wellness exam Z00.00 V70.0 4. ACP (advance care planning) Z71.89 V65.49   
5. Post-menopausal osteoporosis M81.0 733.01 DEXA BONE DENSITY STUDY AXIAL 6. Encounter for laboratory test Z01.89 V72.60 COLLECTION VENOUS BLOOD,VENIPUNCTURE  
 
lab results and schedule of future lab studies reviewed with patient 
reviewed diet, exercise and weight control 
cardiovascular risk and specific lipid/LDL goals reviewed 
reviewed medications and side effects in detail I have discussed the diagnosis with the patient and the intended plan of care as seen in the above orders. The patient has received an after-visit summary and questions were answered concerning future plans. I have discussed medication, side effects, and warnings with the patient in detail.  The patient verbalized understanding and is in agreement with the plan of care. The patient will contact the office with any additional concerns.  
 
Vijay Grimaldo MD

## 2019-01-17 NOTE — ACP (ADVANCE CARE PLANNING)
Advance Care Planning (ACP) Provider Note - Comprehensive     Date of ACP Conversation: 01/17/19  Persons included in Conversation:  patient  Length of ACP Conversation in minutes:  16 minutes    Authorized Decision Maker (if patient is incapable of making informed decisions): This person is:  Patient states that her daughter would be authorize decision-maker. She will fill out forms given and bring this back in for scanning into the EMR chart. General ACP for ALL Patients with Decision Making Capacity:   Importance of advance care planning, including choosing a healthcare agent to communicate patient's healthcare decisions if patient lost the ability to make decisions, such as after a sudden illness or accident  Understanding of the healthcare agent role was assessed and information provided  Exploration of values, goals, and preferences if recovery is not expected, even with continued medical treatment in the event of: Imminent death  Severe, permanent brain injury  \"In these circumstances, what matters most to you? \"  Care focused more on comfort or quality of life.   Opportunity offered to explore how cultural, Jehovah's witness, spiritual, or personal beliefs would affect decisions for future care     Interventions Provided:  Recommended completion of Advance Directive form after review of ACP materials and conversation with prospective healthcare agent      Blanche Amador MD

## 2019-01-18 DIAGNOSIS — I10 ESSENTIAL HYPERTENSION: ICD-10-CM

## 2019-01-21 RX ORDER — LOSARTAN POTASSIUM 100 MG/1
100 TABLET ORAL DAILY
Qty: 30 TAB | Refills: 3 | Status: SHIPPED | OUTPATIENT
Start: 2019-01-21 | End: 2019-10-22 | Stop reason: ALTCHOICE

## 2019-02-19 DIAGNOSIS — M81.0 POST-MENOPAUSAL OSTEOPOROSIS: ICD-10-CM

## 2019-02-20 ENCOUNTER — OFFICE VISIT (OUTPATIENT)
Dept: FAMILY MEDICINE CLINIC | Age: 69
End: 2019-02-20

## 2019-02-20 VITALS
TEMPERATURE: 98.4 F | BODY MASS INDEX: 18.66 KG/M2 | HEART RATE: 74 BPM | OXYGEN SATURATION: 99 % | WEIGHT: 112 LBS | SYSTOLIC BLOOD PRESSURE: 122 MMHG | HEIGHT: 65 IN | DIASTOLIC BLOOD PRESSURE: 89 MMHG | RESPIRATION RATE: 16 BRPM

## 2019-02-20 DIAGNOSIS — M79.18 CHRONIC GLUTEAL PAIN: ICD-10-CM

## 2019-02-20 DIAGNOSIS — J30.0 VASOMOTOR RHINITIS: ICD-10-CM

## 2019-02-20 DIAGNOSIS — M46.1 SACROILIITIS (HCC): Primary | ICD-10-CM

## 2019-02-20 DIAGNOSIS — I10 ESSENTIAL HYPERTENSION: ICD-10-CM

## 2019-02-20 DIAGNOSIS — G89.29 CHRONIC GLUTEAL PAIN: ICD-10-CM

## 2019-02-20 DIAGNOSIS — E78.5 DYSLIPIDEMIA: ICD-10-CM

## 2019-02-20 RX ORDER — MELOXICAM 7.5 MG/1
7.5 TABLET ORAL DAILY
Qty: 30 TAB | Refills: 0 | Status: SHIPPED | OUTPATIENT
Start: 2019-02-20 | End: 2019-02-20 | Stop reason: SDUPTHER

## 2019-02-20 RX ORDER — AMOXICILLIN 875 MG/1
TABLET, FILM COATED ORAL
Refills: 0 | COMMUNITY
Start: 2019-02-10 | End: 2019-10-22

## 2019-02-20 RX ORDER — PREDNISONE 20 MG/1
TABLET ORAL
Refills: 0 | COMMUNITY
Start: 2019-02-10 | End: 2019-10-22

## 2019-02-20 NOTE — PROGRESS NOTES
Chief Complaint   Patient presents with    Hip Pain     Urgent care visit on 2/17/19 for Right hip pain    Hypertension    Allergic Rhinitis     symptoms improved     1. Have you been to the ER, urgent care clinic since your last visit? Hospitalized since your last visit? Yes, Urgent Care on 2/17/19 for Right hip pain. No relief with Prednisone. 2. Have you seen or consulted any other health care providers outside of the [address] [place] [address] since your last visit? Include any pap smears or colon screening. Yes, appt pending next week for tooth extraction. Currently taking Amoxicillin 875mg. [facility]  Niya Sinclair comes in for follow-up care. Patient has right hip pain. This has been ongoing for a long time. Most recently became more severe and was seen in urgent care. She was put on prednisone taper. Still continues to have pain and discomfort. This is mainly at the ischial area. Worse when she sits down. I suspect sacroiliitis. She does not want any referral to see the orthopedist at the moment. Does not want any local steroid injections given. After discussion I will put her on an NSAID. We will give her Mobic to take daily. I will follow-up at next visit. She also has gingivitis and dental caries. She does have dental extraction planned. She is on amoxicillin for this we will continue with the current treatment plan. Patient has hypertension. Blood pressure is stable. She is on Cozaar 100 mg daily. Also on amlodipine 10 mg daily. We had adjusted her medications last time she was here. We will continue with the current treatment plan. Patient has nasal congestion, sneezing and runny nose. She takes Zyrtec and Flonase. We will continue with the current treatment plan. She has dyslipidemia. She is on Lipitor 80 mg daily. Plan to recheck labs at next visit.       Past Medical History  Past Medical History:   Diagnosis Date    Arthritis     Blind left eye     Breast cancer ([facility] [facility]     Glaucoma     High cholesterol     Hip pain     Hypertension        Surgical History  Past Surgical History:   Procedure Laterality Date    COLONOSCOPY N/A 5/9/2018    COLONOSCOPY / polypectomy performed by Rick Harmon MD at [facility] ENDOSCOPY    HX COLONOSCOPY  2008    hx of polyps    HX MASTECTOMY Left     VASCULAR SURGERY PROCEDURE UNLIST Bilateral     vein cleaning to help with nerves        Medications  Current Outpatient Medications   Medication Sig Dispense Refill    amoxicillin (AMOXIL) 875 mg tablet TK 1 T PO BID FOR 10 DAYS  0    predniSONE (DELTASONE) 20 mg tablet   0    losartan (COZAAR) 100 mg tablet Take 1 Tab by mouth daily. 30 Tab 3    aspirin delayed-release 81 mg tablet Take  by mouth daily.  amLODIPine (NORVASC) 10 mg tablet TAKE 1 TABLET BY MOUTH DAILY 90 Tab 2    fluticasone (FLONASE) 50 mcg/actuation nasal spray SHAKE LIQUID AND USE 2 SPRAYS IN EACH NOSTRIL DAILY 1 Bottle 0    cetirizine (ZYRTEC) 10 mg tablet Take 1 Tab by mouth daily as needed for Allergies. 30 Tab 3    albuterol (VENTOLIN HFA) 90 mcg/actuation inhaler Take 2 Puffs by inhalation every six (6) hours as needed for Wheezing. 1 Inhaler 2    atorvastatin (LIPITOR) 80 mg tablet Take 1 Tab by mouth daily. 30 Tab 2    anastrozole (ARIMIDEX) 1 mg tablet TK 1 T PO  QD  0    COMBIGAN 0.2-0.5 % drop ophthalmic solution INSTILL 1 DROP DAILY IN RIGHT EYE  3    meloxicam (MOBIC) 7.5 mg tablet Take 1 Tab by mouth daily as needed for Pain. 90 Tab 0    cyclobenzaprine (FLEXERIL) 10 mg tablet Take 1 Tab by mouth three (3) times daily as needed for Muscle Spasm(s).  27 Tab 3       Allergies  No Known Allergies    Family History  Family History   Problem Relation Age of Onset    Diabetes Mother     Diabetes Father     Colon Polyps Sister        Social History  Social History     Socioeconomic History    Marital status:      Spouse name: Not on file    Number of children: Not on file    Years of education: Not on file    Highest education level: Not on file   Social Needs    Financial resource strain: Not on file    Food insecurity - worry: Not on file    Food insecurity - inability: Not on file    Transportation needs - medical: Not on file   [place] needs - non-medical: Not on file   Occupational History    Occupation: Retired   Tobacco Use    Smoking status: Light Tobacco Smoker     Types: Cigarettes    Smokeless tobacco: Never Used   Substance and Sexual Activity    Alcohol use: Yes     Comment: occ    Drug use: Yes     Types: Prescription    Sexual activity: No   Other Topics Concern     Service No    Blood Transfusions No    Caffeine Concern No    Occupational Exposure No    Hobby Hazards No    Sleep Concern No    Stress Concern No    Weight Concern No    Special Diet No    Back Care No    Exercise No    Bike Helmet No    Seat Belt Yes    Self-Exams Yes   Social History Narrative    Not on file       Review of Systems  Review of Systems -review of all systems is negative except as noted above in the HPI.     Vital Signs  Visit Vitals  /89 (BP 1 Location: Right arm, BP Patient Position: Sitting)   Pulse 74   Temp 98.4 °F (36.9 °C) (Oral)   Resp 16   Ht 5' 5\" (1.651 m)   Wt 112 lb (50.8 kg)   SpO2 99%   BMI 18.64 kg/m²         Physical Exam  Physical Examination: General appearance - oriented to person, place, and time and acyanotic, in no respiratory distress  Mental status - alert, oriented to person, place, and time, affect appropriate to mood  Neck - supple, no significant adenopathy  Lymphatics - no palpable lymphadenopathy  Chest - no tachypnea, retractions or cyanosis  Heart - S1 and S2 normal  Back exam - limited range of motion, pain with motion noted during exam, tenderness noted midline lumbar spine and sacral area  Neurological - normal muscle tone, no tremors, strength 5/5  Musculoskeletal -discomfort to palpation right gluteal muscle and the ischium  Extremities - intact peripheral pulses    Results  Results for orders placed or performed during the hospital encounter of 01/17/19   CBC WITH AUTOMATED DIFF   Result Value Ref Range    WBC 6.7 4.6 - 13.2 K/uL    RBC 4.13 (L) 4.20 - 5.30 M/uL    HGB 12.1 12.0 - 16.0 g/dL    HCT 36.9 35.0 - 45.0 %    MCV 89.3 74.0 - 97.0 FL    MCH 29.3 24.0 - 34.0 PG    MCHC 32.8 31.0 - 37.0 g/dL    RDW 13.4 11.6 - 14.5 %    PLATELET 017 100 - 202 K/uL    MPV 9.2 9.2 - 11.8 FL    NEUTROPHILS 55 40 - 73 %    LYMPHOCYTES 37 21 - 52 %    MONOCYTES 6 3 - 10 %    EOSINOPHILS 2 0 - 5 %    BASOPHILS 0 0 - 2 %    ABS. NEUTROPHILS 3.7 1.8 - 8.0 K/UL    ABS. LYMPHOCYTES 2.5 0.9 - 3.6 K/UL    ABS. MONOCYTES 0.4 0.05 - 1.2 K/UL    ABS. EOSINOPHILS 0.1 0.0 - 0.4 K/UL    ABS. BASOPHILS 0.0 0.0 - 0.1 K/UL    DF AUTOMATED     METABOLIC PANEL, COMPREHENSIVE   Result Value Ref Range    Sodium 142 136 - 145 mmol/L    Potassium 4.5 3.5 - 5.5 mmol/L    Chloride 106 100 - 108 mmol/L    CO2 27 21 - 32 mmol/L    Anion gap 9 3.0 - 18 mmol/L    Glucose 98 74 - 99 mg/dL    BUN 12 7.0 - 18 MG/DL    Creatinine 0.58 (L) 0.6 - 1.3 MG/DL    BUN/Creatinine ratio 21 (H) 12 - 20      GFR est AA >60 >60 ml/min/1.73m2    GFR est non-AA >60 >60 ml/min/1.73m2    Calcium 9.8 8.5 - 10.1 MG/DL    Bilirubin, total 1.4 (H) 0.2 - 1.0 MG/DL    ALT (SGPT) 22 13 - 56 U/L    AST (SGOT) 16 15 - 37 U/L    Alk. phosphatase 89 45 - 117 U/L    Protein, total 7.0 6.4 - 8.2 g/dL    Albumin 4.1 3.4 - 5.0 g/dL    Globulin 2.9 2.0 - 4.0 g/dL    A-G Ratio 1.4 0.8 - 1.7     LIPID PANEL   Result Value Ref Range    LIPID PROFILE          Cholesterol, total 194 <200 MG/DL    Triglyceride 87 <150 MG/DL    HDL Cholesterol 84 (H) 40 - 60 MG/DL    LDL, calculated 92.6 0 - 100 MG/DL    VLDL, calculated 17.4 MG/DL    CHOL/HDL Ratio 2.3 0 - 5.0         ASSESSMENT and PLAN    ICD-10-CM ICD-9-CM    1. Sacroiliitis (HCC) M46.1 720.2    2. Essential hypertension I10 401.9    3.  Dyslipidemia E78.5 272.4    4. Vasomotor rhinitis J30.0 477.9    5. 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 The patient has received an after-visit summary and questions were answered concerning future plans. I have discussed medication, side effects, and warnings with the patient in detail. The patient verbalized understanding and is in agreement with the plan of care. The patient will contact the office with any additional concerns.     Chivo Harris MD

## 2019-03-07 ENCOUNTER — DOCUMENTATION ONLY (OUTPATIENT)
Dept: INTERNAL MEDICINE CLINIC | Age: 69
End: 2019-03-07

## 2019-08-05 ENCOUNTER — DOCUMENTATION ONLY (OUTPATIENT)
Dept: FAMILY MEDICINE CLINIC | Age: 69
End: 2019-08-05

## 2019-10-22 ENCOUNTER — OFFICE VISIT (OUTPATIENT)
Dept: FAMILY MEDICINE CLINIC | Age: 69
End: 2019-10-22

## 2019-10-22 ENCOUNTER — HOSPITAL ENCOUNTER (OUTPATIENT)
Dept: LAB | Age: 69
Discharge: HOME | End: 2019-10-22
Payer: COMMERCIAL

## 2019-10-22 VITALS
TEMPERATURE: 96.7 F | BODY MASS INDEX: 17.49 KG/M2 | WEIGHT: 105 LBS | HEART RATE: 63 BPM | RESPIRATION RATE: 20 BRPM | SYSTOLIC BLOOD PRESSURE: 175 MMHG | HEIGHT: 65 IN | DIASTOLIC BLOOD PRESSURE: 77 MMHG | OXYGEN SATURATION: 97 %

## 2019-10-22 DIAGNOSIS — I10 ESSENTIAL HYPERTENSION: ICD-10-CM

## 2019-10-22 DIAGNOSIS — M25.551 RIGHT HIP PAIN: ICD-10-CM

## 2019-10-22 DIAGNOSIS — M79.671 PAIN IN BOTH FEET: ICD-10-CM

## 2019-10-22 DIAGNOSIS — R09.81 SINUS CONGESTION: ICD-10-CM

## 2019-10-22 DIAGNOSIS — M79.672 PAIN IN BOTH FEET: ICD-10-CM

## 2019-10-22 DIAGNOSIS — R20.0 NUMBNESS AND TINGLING: ICD-10-CM

## 2019-10-22 DIAGNOSIS — E07.9 THYROID DISORDER: ICD-10-CM

## 2019-10-22 DIAGNOSIS — R20.2 NUMBNESS AND TINGLING: ICD-10-CM

## 2019-10-22 DIAGNOSIS — Z23 ENCOUNTER FOR IMMUNIZATION: ICD-10-CM

## 2019-10-22 DIAGNOSIS — J30.0 VASOMOTOR RHINITIS: ICD-10-CM

## 2019-10-22 DIAGNOSIS — I10 ESSENTIAL HYPERTENSION: Primary | ICD-10-CM

## 2019-10-22 DIAGNOSIS — E78.5 DYSLIPIDEMIA: ICD-10-CM

## 2019-10-22 LAB
ALBUMIN SERPL-MCNC: 4.1 G/DL (ref 3.4–5)
ALBUMIN/GLOB SERPL: 1.4 {RATIO} (ref 0.8–1.7)
ALP SERPL-CCNC: 67 U/L (ref 45–117)
ALT SERPL-CCNC: 17 U/L (ref 13–56)
ANION GAP SERPL CALC-SCNC: 10 MMOL/L (ref 3–18)
AST SERPL-CCNC: 11 U/L (ref 10–38)
BASOPHILS # BLD: 0 K/UL (ref 0–0.1)
BASOPHILS NFR BLD: 1 % (ref 0–2)
BILIRUB SERPL-MCNC: 1.1 MG/DL (ref 0.2–1)
BUN SERPL-MCNC: 14 MG/DL (ref 7–18)
BUN/CREAT SERPL: 20 (ref 12–20)
CALCIUM SERPL-MCNC: 10.1 MG/DL (ref 8.5–10.1)
CHLORIDE SERPL-SCNC: 106 MMOL/L (ref 100–111)
CO2 SERPL-SCNC: 28 MMOL/L (ref 21–32)
CREAT SERPL-MCNC: 0.7 MG/DL (ref 0.6–1.3)
DIFFERENTIAL METHOD BLD: ABNORMAL
EOSINOPHIL # BLD: 0.1 K/UL (ref 0–0.4)
EOSINOPHIL NFR BLD: 1 % (ref 0–5)
ERYTHROCYTE [DISTWIDTH] IN BLOOD BY AUTOMATED COUNT: 13.7 % (ref 11.6–14.5)
GLOBULIN SER CALC-MCNC: 2.9 G/DL (ref 2–4)
GLUCOSE SERPL-MCNC: 115 MG/DL (ref 74–99)
HCT VFR BLD AUTO: 38 % (ref 35–45)
HGB BLD-MCNC: 12.5 G/DL (ref 12–16)
LYMPHOCYTES # BLD: 1.8 K/UL (ref 0.9–3.6)
LYMPHOCYTES NFR BLD: 33 % (ref 21–52)
MCH RBC QN AUTO: 30 PG (ref 24–34)
MCHC RBC AUTO-ENTMCNC: 32.9 G/DL (ref 31–37)
MCV RBC AUTO: 91.1 FL (ref 74–97)
MONOCYTES # BLD: 0.2 K/UL (ref 0.05–1.2)
MONOCYTES NFR BLD: 4 % (ref 3–10)
NEUTS SEG # BLD: 3.3 K/UL (ref 1.8–8)
NEUTS SEG NFR BLD: 61 % (ref 40–73)
PLATELET # BLD AUTO: 416 K/UL (ref 135–420)
PMV BLD AUTO: 9.7 FL (ref 9.2–11.8)
POTASSIUM SERPL-SCNC: 4 MMOL/L (ref 3.5–5.5)
PROT SERPL-MCNC: 7 G/DL (ref 6.4–8.2)
RBC # BLD AUTO: 4.17 M/UL (ref 4.2–5.3)
SODIUM SERPL-SCNC: 144 MMOL/L (ref 136–145)
TSH SERPL DL<=0.05 MIU/L-ACNC: 0.64 UIU/ML (ref 0.36–3.74)
WBC # BLD AUTO: 5.5 K/UL (ref 4.6–13.2)

## 2019-10-22 PROCEDURE — 85025 COMPLETE CBC W/AUTO DIFF WBC: CPT

## 2019-10-22 PROCEDURE — 84443 ASSAY THYROID STIM HORMONE: CPT

## 2019-10-22 PROCEDURE — 80053 COMPREHEN METABOLIC PANEL: CPT

## 2019-10-22 RX ORDER — FLUTICASONE PROPIONATE 50 MCG
2 SPRAY, SUSPENSION (ML) NASAL DAILY
Qty: 1 BOTTLE | Refills: 1 | Status: SHIPPED | OUTPATIENT
Start: 2019-10-22 | End: 2019-10-22 | Stop reason: SDUPTHER

## 2019-10-22 RX ORDER — AMLODIPINE BESYLATE 10 MG/1
TABLET ORAL
Qty: 90 TAB | Refills: 1 | Status: SHIPPED | OUTPATIENT
Start: 2019-10-22 | End: 2020-11-03 | Stop reason: SDUPTHER

## 2019-10-22 RX ORDER — FLUTICASONE PROPIONATE 50 MCG
SPRAY, SUSPENSION (ML) NASAL
Qty: 1 BOTTLE | Refills: 1 | Status: SHIPPED | OUTPATIENT
Start: 2019-10-22 | End: 2020-07-17 | Stop reason: SDUPTHER

## 2019-10-22 RX ORDER — CYCLOBENZAPRINE HCL 10 MG
10 TABLET ORAL
Qty: 60 TAB | Refills: 3 | Status: SHIPPED | OUTPATIENT
Start: 2019-10-22 | End: 2020-11-03 | Stop reason: SDUPTHER

## 2019-10-22 RX ORDER — VALSARTAN 160 MG/1
160 TABLET ORAL DAILY
Qty: 90 TAB | Refills: 1 | Status: SHIPPED | OUTPATIENT
Start: 2019-10-22 | End: 2020-11-03 | Stop reason: SDUPTHER

## 2019-10-22 RX ORDER — MELOXICAM 7.5 MG/1
7.5 TABLET ORAL
Qty: 90 TAB | Refills: 0 | Status: SHIPPED | OUTPATIENT
Start: 2019-10-22 | End: 2020-07-17 | Stop reason: SDUPTHER

## 2019-10-22 RX ORDER — PROMETHAZINE HYDROCHLORIDE AND DEXTROMETHORPHAN HYDROBROMIDE 6.25; 15 MG/5ML; MG/5ML
5 SYRUP ORAL
Qty: 240 ML | Refills: 0 | Status: SHIPPED | OUTPATIENT
Start: 2019-10-22 | End: 2019-10-29

## 2019-10-22 RX ORDER — ATORVASTATIN CALCIUM 80 MG/1
80 TABLET, FILM COATED ORAL DAILY
Qty: 90 TAB | Refills: 1 | Status: SHIPPED | OUTPATIENT
Start: 2019-10-22 | End: 2020-11-03 | Stop reason: SDUPTHER

## 2019-10-22 NOTE — PROGRESS NOTES
Chief Complaint   Patient presents with    Numbness     pt c/o numbness in left foot and toes     1. Have you been to the ER, urgent care clinic since your last visit? Hospitalized since your last visit? Yes When: 08/2019 Urgent Care    2. Have you seen or consulted any other health care providers outside of the [address] [place] [address] since your last visit? Include any pap smears or colon screening. No    HPI  Aleksandr Cohen comes in for f/u care. HTN: BP is elevated, she is off medication. She has been without medication for weeks. I did emphasize the need to be compliant with treatment plan. We will send in a refill of the Norvasc and losartan. She will keep blood pressure log and follow-up with us at next visit. Dyslipidemia: she is on atorvastatin, off medication as she has ran out. Would like refills of this. Prescription is sent in. Muscle spasm: patient has a h/o back pain and muscle spasm. Would like refill of flexeril. She will do supportive care including heat or application as needed for comfort. She will continue taking Mobic. Feet pain: patient has numbness and tingling distal left foot. Ongoing for 3 months. No swelling or redness of the feet. No pain on walking. Will refer to neurology for evaluation and possible EMG studies. Breast cancer: she is on arimidex, stable on medication. F/u by the specialist in breast clinic. Sinus congestion: she has sneezing and throat irritation. No fever or chills. Occasional cough, productive clear phlegm. Cough at night with occasional wheeze, using inhaler medication with relief.     Past Medical History  Past Medical History:   Diagnosis Date    Arthritis     Blind left eye     Breast cancer ([facility] [address]     Glaucoma     High cholesterol     Hip pain     Hypertension        Surgical History  Past Surgical History:   Procedure Laterality Date    COLONOSCOPY N/A 5/9/2018    COLONOSCOPY / polypectomy performed by Vidhi Xie MD at [facility] HX COLONOSCOPY  2008    hx of polyps    HX MASTECTOMY Left     VASCULAR SURGERY PROCEDURE UNLIST Bilateral     vein cleaning to help with nerves        Medications  Current Outpatient Medications   Medication Sig Dispense Refill    aspirin delayed-release 81 mg tablet Take  by mouth daily.  albuterol (VENTOLIN HFA) 90 mcg/actuation inhaler Take 2 Puffs by inhalation every six (6) hours as needed for Wheezing. 1 Inhaler 2    anastrozole (ARIMIDEX) 1 mg tablet TK 1 T PO  QD  0    COMBIGAN 0.2-0.5 % drop ophthalmic solution INSTILL 1 DROP DAILY IN RIGHT EYE  3    amoxicillin (AMOXIL) 875 mg tablet TK 1 T PO BID FOR 10 DAYS  0    predniSONE (DELTASONE) 20 mg tablet   0    meloxicam (MOBIC) 7.5 mg tablet Take 1 Tab by mouth daily as needed for Pain. 90 Tab 0    losartan (COZAAR) 100 mg tablet Take 1 Tab by mouth daily. 30 Tab 3    amLODIPine (NORVASC) 10 mg tablet TAKE 1 TABLET BY MOUTH DAILY 90 Tab 2    fluticasone (FLONASE) 50 mcg/actuation nasal spray SHAKE LIQUID AND USE 2 SPRAYS IN EACH NOSTRIL DAILY 1 Bottle 0    cetirizine (ZYRTEC) 10 mg tablet Take 1 Tab by mouth daily as needed for Allergies. 30 Tab 3    cyclobenzaprine (FLEXERIL) 10 mg tablet Take 1 Tab by mouth three (3) times daily as needed for Muscle Spasm(s). 30 Tab 3    atorvastatin (LIPITOR) 80 mg tablet Take 1 Tab by mouth daily.  27 Tab 2       Allergies  No Known Allergies    Family History  Family History   Problem Relation Age of Onset    Diabetes Mother     Diabetes Father     Colon Polyps Sister        Social History  Social History     Socioeconomic History    Marital status:      Spouse name: Not on file    Number of children: Not on file    Years of education: Not on file    Highest education level: Not on file   Occupational History    Occupation: Retired   Social Needs    Financial resource strain: Not on file    Food insecurity:     Worry: Not on file     Inability: Not on file   Nuvilex needs: Medical: Not on file     Non-medical: Not on file   Tobacco Use    Smoking status: Light Tobacco Smoker     Types: Cigarettes    Smokeless tobacco: Never Used   Substance and Sexual Activity    Alcohol use: Yes     Comment: occ    Drug use: Yes     Types: Prescription    Sexual activity: Never   Lifestyle    Physical activity:     Days per week: Not on file     Minutes per session: Not on file    Stress: Not on file   Relationships    Social connections:     Talks on phone: Not on file     Gets together: Not on file     Attends [employer] witness service: Not on file     Active member of club or organization: Not on file     Attends meetings of clubs or organizations: Not on file     Relationship status: Not on file    Intimate partner violence:     Fear of current or ex partner: Not on file     Emotionally abused: Not on file     Physically abused: Not on file     Forced sexual activity: Not on file   Other Topics Concern     Service No    Blood Transfusions No    Caffeine Concern No    Occupational Exposure No    Hobby Hazards No    Sleep Concern No    Stress Concern No    Weight Concern No    Special Diet No    Back Care No    Exercise No    Bike Helmet No    Seat Belt Yes    Self-Exams Yes   Social History Narrative    Not on file       Review of Systems  Review of Systems - Review of all systems is negative except as noted above in the HPI.     Vital Signs  Visit Vitals  /77 (BP 1 Location: Right arm, BP Patient Position: Sitting)   Pulse 63   Temp 96.7 °F (35.9 °C) (Oral)   Resp 20   Ht 5' 5\" (1.651 m)   Wt 105 lb (47.6 kg)   SpO2 97%   BMI 17.47 kg/m²         Physical Exam  Physical Examination: General appearance - oriented to person, place, and time, acyanotic, in no respiratory distress and well hydrated  Mental status - affect appropriate to mood  Mouth - mucous membranes moist, pharynx normal without lesions  Neck - supple, no significant adenopathy  Lymphatics - no palpable lymphadenopathy, no hepatosplenomegaly  Chest - no tachypnea, retractions or cyanosis  Heart - S1 and S2 normal  Abdomen - no rebound tenderness noted  Back exam - limited range of motion  Neurological - abnormal neurological exam unchanged from prior examinations  Musculoskeletal - osteoarthritic changes noted in both hands, limited flexion  extension and internal rotation of the hip due to pain and discomfort. Extremities -left foot with numbness and tingling. Eyes - sclera anicteric  Ears - bilateral TM's and external ear canals normal  Nose - mucosal congestion and mucosal erythema      Results  Results for orders placed or performed during the hospital encounter of 01/17/19   CBC WITH AUTOMATED DIFF   Result Value Ref Range    WBC 6.7 4.6 - 13.2 K/uL    RBC 4.13 (L) 4.20 - 5.30 M/uL    HGB 12.1 12.0 - 16.0 g/dL    HCT 36.9 35.0 - 45.0 %    MCV 89.3 74.0 - 97.0 FL    MCH 29.3 24.0 - 34.0 PG    MCHC 32.8 31.0 - 37.0 g/dL    RDW 13.4 11.6 - 14.5 %    PLATELET 697 680 - 673 K/uL    MPV 9.2 9.2 - 11.8 FL    NEUTROPHILS 55 40 - 73 %    LYMPHOCYTES 37 21 - 52 %    MONOCYTES 6 3 - 10 %    EOSINOPHILS 2 0 - 5 %    BASOPHILS 0 0 - 2 %    ABS. NEUTROPHILS 3.7 1.8 - 8.0 K/UL    ABS. LYMPHOCYTES 2.5 0.9 - 3.6 K/UL    ABS. MONOCYTES 0.4 0.05 - 1.2 K/UL    ABS. EOSINOPHILS 0.1 0.0 - 0.4 K/UL    ABS. BASOPHILS 0.0 0.0 - 0.1 K/UL    DF AUTOMATED     METABOLIC PANEL, COMPREHENSIVE   Result Value Ref Range    Sodium 142 136 - 145 mmol/L    Potassium 4.5 3.5 - 5.5 mmol/L    Chloride 106 100 - 108 mmol/L    CO2 27 21 - 32 mmol/L    Anion gap 9 3.0 - 18 mmol/L    Glucose 98 74 - 99 mg/dL    BUN 12 7.0 - 18 MG/DL    Creatinine 0.58 (L) 0.6 - 1.3 MG/DL    BUN/Creatinine ratio 21 (H) 12 - 20      GFR est AA >60 >60 ml/min/1.73m2    GFR est non-AA >60 >60 ml/min/1.73m2    Calcium 9.8 8.5 - 10.1 MG/DL    Bilirubin, total 1.4 (H) 0.2 - 1.0 MG/DL    ALT (SGPT) 22 13 - 56 U/L    AST (SGOT) 16 15 - 37 U/L    Alk.  phosphatase 89 45 - 117 U/L    Protein, total 7.0 6.4 - 8.2 g/dL    Albumin 4.1 3.4 - 5.0 g/dL    Globulin 2.9 2.0 - 4.0 g/dL    A-G Ratio 1.4 0.8 - 1.7     LIPID PANEL   Result Value Ref Range    LIPID PROFILE          Cholesterol, total 194 <200 MG/DL    Triglyceride 87 <150 MG/DL    HDL Cholesterol 84 (H) 40 - 60 MG/DL    LDL, calculated 92.6 0 - 100 MG/DL    VLDL, calculated 17.4 MG/DL    CHOL/HDL Ratio 2.3 0 - 5.0         ASSESSMENT and PLAN      ICD-10-CM ICD-9-CM    1. Essential hypertension I10 401.9 cyclobenzaprine (FLEXERIL) 10 mg tablet      amLODIPine (NORVASC) 10 mg tablet      valsartan (DIOVAN) 160 mg tablet      METABOLIC PANEL, COMPREHENSIVE      CBC WITH AUTOMATED DIFF      COLLECTION VENOUS BLOOD,VENIPUNCTURE   2. Dyslipidemia E78.5 272.4 atorvastatin (LIPITOR) 80 mg tablet      COLLECTION VENOUS BLOOD,VENIPUNCTURE   3. Right hip pain M25.551 719.45 meloxicam (MOBIC) 7.5 mg tablet   4. Pain in both feet M79.671 729.5     M79.672     5. Vasomotor rhinitis J30.0 477.9 DISCONTINUED: fluticasone propionate (FLONASE) 50 mcg/actuation nasal spray   6. Sinus congestion R09.81 478. 19 promethazine-dextromethorphan (PROMETHAZINE-DM) 6.25-15 mg/5 mL syrup      DISCONTINUED: fluticasone propionate (FLONASE) 50 mcg/actuation nasal spray   7. Numbness and tingling M89.4 500.6 METABOLIC PANEL, COMPREHENSIVE    R20.2  CBC WITH AUTOMATED DIFF      REFERRAL TO NEUROLOGY   8. Thyroid disorder E07.9 246.9 TSH 3RD GENERATION      COLLECTION VENOUS BLOOD,VENIPUNCTURE   9. Encounter for immunization Z23 V03.89 INFLUENZA VACCINE INACTIVATED (IIV), SUBUNIT, ADJUVANTED, IM      PNEUMOCOCCAL CONJ VACCINE 13 VALENT IM      ADMIN INFLUENZA VIRUS VAC      ADMIN PNEUMOCOCCAL VACCINE   Discussed the patient's BMI with her.   The BMI follow up plan is as follows:     weight gain advised  nutrition/feeding management  lab results and schedule of future lab studies reviewed with patient  reviewed diet, exercise and weight control  cardiovascular risk and specific lipid/LDL goals reviewed  reviewed medications and side effects in detail      I have discussed the diagnosis with the patient and the intended plan of care as seen in the above orders. The patient has received an after-visit summary and questions were answered concerning future plans. I have discussed medication, side effects, and warnings with the patient in detail. The patient verbalized understanding and is in agreement with the plan of care. The patient will contact the office with any additional concerns. Faye Finney MD    PLEASE NOTE:   This document has been produced using voice recognition software.  Unrecognized errors in transcription may be present

## 2019-10-22 NOTE — PROGRESS NOTES
Venipuncture to right forearm at this time. Patient tolerated well at this time. Flu and PCV13 injection given at this time to right deltoid. Patient tolerated well at this time.  No other concerns voiced at this time

## 2019-10-22 NOTE — PATIENT INSTRUCTIONS
Body Mass Index: Care Instructions Your Care Instructions Body mass index (BMI) can help you see if your weight is raising your risk for health problems. It uses a formula to compare how much you weigh with how tall you are. · A BMI lower than 18.5 is considered underweight. · A BMI between 18.5 and 24.9 is considered healthy. · A BMI between 25 and 29.9 is considered overweight. A BMI of 30 or higher is considered obese. If your BMI is in the normal range, it means that you have a lower risk for weight-related health problems. If your BMI is in the overweight or obese range, you may be at increased risk for weight-related health problems, such as high blood pressure, heart disease, stroke, arthritis or joint pain, and diabetes. If your BMI is in the underweight range, you may be at increased risk for health problems such as fatigue, lower protection (immunity) against illness, muscle loss, bone loss, hair loss, and hormone problems. BMI is just one measure of your risk for weight-related health problems. You may be at higher risk for health problems if you are not active, you eat an unhealthy diet, or you drink too much alcohol or use tobacco products. Follow-up care is a key part of your treatment and safety. Be sure to make and go to all appointments, and call your doctor if you are having problems. It's also a good idea to know your test results and keep a list of the medicines you take. How can you care for yourself at home? · Practice healthy eating habits. This includes eating plenty of fruits, vegetables, whole grains, lean protein, and low-fat dairy. · If your doctor recommends it, get more exercise. Walking is a good choice. Bit by bit, increase the amount you walk every day. Try for at least 30 minutes on most days of the week. · Do not smoke. Smoking can increase your risk for health problems.  If you need help quitting, talk to your doctor about stop-smoking programs and medicines. These can increase your chances of quitting for good. · Limit alcohol to 2 drinks a day for men and 1 drink a day for women. Too much alcohol can cause health problems. If you have a BMI higher than 25 · Your doctor may do other tests to check your risk for weight-related health problems. This may include measuring the distance around your waist. A waist measurement of more than 40 inches in men or 35 inches in women can increase the risk of weight-related health problems. · Talk with your doctor about steps you can take to stay healthy or improve your health. You may need to make lifestyle changes to lose weight and stay healthy, such as changing your diet and getting regular exercise. If you have a BMI lower than 18.5 · Your doctor may do other tests to check your risk for health problems. · Talk with your doctor about steps you can take to stay healthy or improve your health. You may need to make lifestyle changes to gain or maintain weight and stay healthy, such as getting more healthy foods in your diet and doing exercises to build muscle. Where can you learn more? Go to http://lady-per.info/. Enter S176 in the search box to learn more about \"Body Mass Index: Care Instructions. \" Current as of: October 13, 2016 Content Version: 11.4 © 8259-2495 [employer]. Care instructions adapted under license by [employer] (which disclaims liability or warranty for this information). If you have questions about a medical condition or this instruction, always ask your healthcare professional. [address] any warranty or liability for your use of this information.

## 2019-10-24 ENCOUNTER — TELEPHONE (OUTPATIENT)
Dept: FAMILY MEDICINE CLINIC | Age: 69
End: 2019-10-24

## 2019-10-24 NOTE — TELEPHONE ENCOUNTER
Patient returned call, states she missed a call from our office. Verified name and . Advised of message below. She expresses understanding. Notes recorded by Efren Mandujano MD on 10/23/2019 at 5:17 PM EDT  Please let patient know her lab results are reassuring.   Ze Merida MD

## 2019-12-02 ENCOUNTER — OFFICE VISIT (OUTPATIENT)
Dept: NEUROLOGY | Age: 69
End: 2019-12-02

## 2019-12-02 VITALS
WEIGHT: 106 LBS | BODY MASS INDEX: 17.66 KG/M2 | DIASTOLIC BLOOD PRESSURE: 69 MMHG | SYSTOLIC BLOOD PRESSURE: 126 MMHG | HEIGHT: 65 IN | RESPIRATION RATE: 18 BRPM | HEART RATE: 69 BPM | OXYGEN SATURATION: 99 % | TEMPERATURE: 98.4 F

## 2019-12-02 DIAGNOSIS — G62.9 NEUROPATHY: Primary | ICD-10-CM

## 2019-12-02 DIAGNOSIS — I73.9 PVD (PERIPHERAL VASCULAR DISEASE) (HCC): ICD-10-CM

## 2019-12-02 NOTE — LETTER
12/2/19 Patient: Gary Artis YOB: 1950 Date of Visit: 12/2/2019 Iam Sherman MD 
[facility] [address] [place] [address] VIA In Basket Dear Iam Sherman MD, Thank you for referring Ms. Gary Artis to [facility] for evaluation. My notes for this consultation are attached. If you have questions, please do not hesitate to call me. I look forward to following your patient along with you. Sincerely, Ping Lopez MD

## 2019-12-02 NOTE — PROGRESS NOTES
Kendal Torres is a 70 y.o. female . presents for Numbness (C/O numbness to left foot) and New Patient   . Mrs. 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 Started from the sole and then gradually involving the dorsum. Is worse when she is resting. Feels better when walking. Has no weakness. Her left foot feels cold. No skin changes. No swelling. No involvement of the right side. No similar symptoms on the upper extremities. Currently she does not have low back pain or radiating pain from the back. She has market loss of her weight. She claims that she is not eating because a problem with her teeth. She is following with vascular surgery for a problem in her popliteal artery. No stents placed. Has a history of diabetes. Review of Systems   Constitutional: Positive for diaphoresis (night sweating), malaise/fatigue (sometimes) and weight loss (losing weight; difficulty chewing from teech problem). Negative for chills and fever. HENT: Negative for hearing loss and tinnitus. Eyes: Negative for blurred vision (left eye blind from glaucoma) and double vision. Respiratory: Positive for cough and sputum production. Negative for hemoptysis, shortness of breath and wheezing. Cardiovascular: Negative for chest pain, palpitations and leg swelling. Gastrointestinal: Positive for constipation. Negative for diarrhea, heartburn, nausea and vomiting. Genitourinary: Positive for frequency. Negative for dysuria, hematuria and urgency. Musculoskeletal: Negative for back pain, falls, joint pain, myalgias and neck pain. Skin: Negative for itching and rash. Neurological: Positive for tingling.  Negative for dizziness, tremors and headaches. Endo/Heme/Allergies: Does not bruise/bleed easily. Psychiatric/Behavioral: Negative for depression. The patient has insomnia (wakes up in the middle).         Past Medical History:   Diagnosis Date    Arthritis     Blind left eye     Breast cancer ([address]     Glaucoma     High cholesterol     Hip pain     Hypertension        Past Surgical History:   Procedure Laterality Date    COLONOSCOPY N/A 5/9/2018    COLONOSCOPY / polypectomy performed by Kameron Martinez MD at [facility] ENDOSCOPY    HX COLONOSCOPY  2008    hx of polyps    HX MASTECTOMY Left     VASCULAR SURGERY PROCEDURE UNLIST Bilateral     vein cleaning to help with nerves        Family History   Problem Relation Age of Onset    Diabetes Mother     Diabetes Father     Colon Polyps Sister         Social History     Socioeconomic History    Marital status:      Spouse name: Not on file    Number of children: Not on file    Years of education: Not on file    Highest education level: Not on file   Occupational History    Occupation: Retired   Social Needs    Financial resource strain: Not on file    Food insecurity:     Worry: Not on file     Inability: Not on file   [employer] needs:     Medical: Not on file     Non-medical: Not on file   Tobacco Use    Smoking status: Light Tobacco Smoker     Types: Cigarettes    Smokeless tobacco: Never Used   Substance and Sexual Activity    Alcohol use: Yes     Comment: occ    Drug use: Yes     Types: Prescription    Sexual activity: Never   Lifestyle    Physical activity:     Days per week: Not on file     Minutes per session: Not on file    Stress: Not on file   Relationships    Social connections:     Talks on phone: Not on file     Gets together: Not on file     Attends Evangelical service: Not on file     Active member of club or organization: Not on file     Attends meetings of clubs or organizations: Not on file     Relationship status: Not on file    Intimate partner violence:     Fear of current or ex partner: Not on file     Emotionally abused: Not on file     Physically abused: Not on file     Forced sexual activity: Not on file   Other Topics Concern     Service No    Blood Transfusions No    Caffeine Concern No    Occupational Exposure No    Hobby Hazards No    Sleep Concern No    Stress Concern No    Weight Concern No    Special Diet No    Back Care No    Exercise No    Bike Helmet No    Seat Belt Yes    Self-Exams Yes   Social History Narrative    Not on file        No Known Allergies      Current Outpatient Medications   Medication Sig Dispense Refill    cyclobenzaprine (FLEXERIL) 10 mg tablet Take 1 Tab by mouth three (3) times daily as needed for Muscle Spasm(s). 60 Tab 3    amLODIPine (NORVASC) 10 mg tablet TAKE 1 TABLET BY MOUTH DAILY 90 Tab 1    valsartan (DIOVAN) 160 mg tablet Take 1 Tab by mouth daily. 90 Tab 1    atorvastatin (LIPITOR) 80 mg tablet Take 1 Tab by mouth daily. 90 Tab 1    meloxicam (MOBIC) 7.5 mg tablet Take 1 Tab by mouth daily as needed for Pain. 90 Tab 0    fluticasone propionate (FLONASE) 50 mcg/actuation nasal spray SHAKE LIQUID AND USE 2 SPRAYS IN EACH NOSTRIL DAILY 1 Bottle 1    aspirin delayed-release 81 mg tablet Take  by mouth daily.  albuterol (VENTOLIN HFA) 90 mcg/actuation inhaler Take 2 Puffs by inhalation every six (6) hours as needed for Wheezing. 1 Inhaler 2    anastrozole (ARIMIDEX) 1 mg tablet TK 1 T PO  QD  0    COMBIGAN 0.2-0.5 % drop ophthalmic solution INSTILL 1 DROP DAILY IN RIGHT EYE  3         Physical Exam  Constitutional:       General: She is not in acute distress. HENT:      Head: Normocephalic and atraumatic. Eyes:      Comments: Opacity in the left eye; no perception of movement on the left. Neck:      Musculoskeletal: Normal range of motion and neck supple. No neck rigidity. Cardiovascular:      Rate and Rhythm: Normal rate and regular rhythm. Heart sounds: No murmur. No gallop. Comments: Couldn't palpate the DP pulses bilaterally. Pulmonary:      Effort: Pulmonary effort is normal. No respiratory distress. Breath sounds: Normal breath sounds. No wheezing or rales. Musculoskeletal:         General: No swelling, tenderness or deformity. Neurological:      Mental Status: She is alert. Comments: Mental status: Awake, alert, oriented x3, follows simple, complex and inverted commands, no neglect, no extinction to DSS or VSS. Speech and languge: fluent, coherent, naming and repitition intact, reading and comprehension intact  CN: B;ind and opaque let eye; right eye normal pupillary reaction; impaired peripheral vision on the right; EOMI,  face sensation intact , no facial asymmetry noted, palate elevation symmetric bilat, SS+SCM 5/5 bilat, tongue midline  Motor: no pronator drift, tone normal throughout, strength 5/5 throughout  Sensory: Reduced  light touch and PP over the left foot.    Coordination: FNF, HS accurate w/o dysmetria  DTR: 2+ throughout except at the ankles 1/4. , toes downgoing BL  Gait: normal.           Hospital Outpatient Visit on 10/22/2019   Component Date Value Ref Range Status    TSH 10/22/2019 0.64  0.36 - 3.74 uIU/mL Final    Sodium 10/22/2019 144  136 - 145 mmol/L Final    Potassium 10/22/2019 4.0  3.5 - 5.5 mmol/L Final    Chloride 10/22/2019 106  100 - 111 mmol/L Final    CO2 10/22/2019 28  21 - 32 mmol/L Final    Anion gap 10/22/2019 10  3.0 - 18 mmol/L Final    Glucose 10/22/2019 115* 74 - 99 mg/dL Final    BUN 10/22/2019 14  7.0 - 18 MG/DL Final    Creatinine 10/22/2019 0.70  0.6 - 1.3 MG/DL Final    BUN/Creatinine ratio 10/22/2019 20  12 - 20   Final    GFR est AA 10/22/2019 >60  >60 ml/min/1.73m2 Final    GFR est non-AA 10/22/2019 >60  >60 ml/min/1.73m2 Final    Comment: (NOTE)  Estimated GFR is calculated using the Modification of Diet in Renal   Disease (MDRD) Study equation, reported for both  Americans   (GFRAA) and non- Americans (GFRNA), and normalized to 1.73m2   body surface area. The physician must decide which value applies to   the patient. The MDRD study equation should only be used in   individuals age 25 or older. It has not been validated for the   following: pregnant women, patients with serious comorbid conditions,   or on certain medications, or persons with extremes of body size,   muscle mass, or nutritional status.  Calcium 10/22/2019 10.1  8.5 - 10.1 MG/DL Final    Bilirubin, total 10/22/2019 1.1* 0.2 - 1.0 MG/DL Final    ALT (SGPT) 10/22/2019 17  13 - 56 U/L Final    AST (SGOT) 10/22/2019 11  10 - 38 U/L Final    Alk. phosphatase 10/22/2019 67  45 - 117 U/L Final    Protein, total 10/22/2019 7.0  6.4 - 8.2 g/dL Final    Albumin 10/22/2019 4.1  3.4 - 5.0 g/dL Final    Globulin 10/22/2019 2.9  2.0 - 4.0 g/dL Final    A-G Ratio 10/22/2019 1.4  0.8 - 1.7   Final    WBC 10/22/2019 5.5  4.6 - 13.2 K/uL Final    RBC 10/22/2019 4.17* 4.20 - 5.30 M/uL Final    HGB 10/22/2019 12.5  12.0 - 16.0 g/dL Final    HCT 10/22/2019 38.0  35.0 - 45.0 % Final    MCV 10/22/2019 91.1  74.0 - 97.0 FL Final    MCH 10/22/2019 30.0  24.0 - 34.0 PG Final    MCHC 10/22/2019 32.9  31.0 - 37.0 g/dL Final    RDW 10/22/2019 13.7  11.6 - 14.5 % Final    PLATELET 47/68/2517 390  135 - 420 K/uL Final    MPV 10/22/2019 9.7  9.2 - 11.8 FL Final    NEUTROPHILS 10/22/2019 61  40 - 73 % Final    LYMPHOCYTES 10/22/2019 33  21 - 52 % Final    MONOCYTES 10/22/2019 4  3 - 10 % Final    EOSINOPHILS 10/22/2019 1  0 - 5 % Final    BASOPHILS 10/22/2019 1  0 - 2 % Final    ABS. NEUTROPHILS 10/22/2019 3.3  1.8 - 8.0 K/UL Final    ABS. LYMPHOCYTES 10/22/2019 1.8  0.9 - 3.6 K/UL Final    ABS. MONOCYTES 10/22/2019 0.2  0.05 - 1.2 K/UL Final    ABS. EOSINOPHILS 10/22/2019 0.1  0.0 - 0.4 K/UL Final    ABS.  BASOPHILS 10/22/2019 0.0  0.0 - 0.1 K/UL Final    DF 10/22/2019 AUTOMATED    Final ICD-10-CM ICD-9-CM    1. Neuropathy G62.9 355.9 EMG LIMITED      HEMOGLOBIN A1C W/O EAG      VITAMIN B12   2. PVD (peripheral vascular disease) ([facility]) I73.9 443.9        Is 70years old female patient with above medical problems came for tingling and numbness over the left foot. Reduced pinprick and light touch sensation on physical examination. Patient also has peripheral vascular disease. Sometimes feels a crampy sensation over her legs when walking. The left foot new symptom could be from peripheral neuropathy. Patient is losing weight. Possible reasons for neuropathy include past history of chemotherapy and possible nutritional deficiencies. She is a diabetic. We will check her A1c and vitamin B12 level. In addition I have referred her for EMG and nerve conduction studies. Patient will follow with vascular surgery at [facility].   We will see her in 3 months time and will call her with the results of the above

## 2019-12-02 NOTE — PROGRESS NOTES
Chief Complaint   Patient presents with    Numbness     C/O numbness to left foot    New Patient       Alireza Lobo presents today for   Chief Complaint   Patient presents with    Numbness     C/O numbness to left foot    New Patient       Is someone accompanying this pt? Yes, Nighat Reasoner    Is the patient using any DME equipment during OV? no    Depression Screening:  3 most recent PHQ Screens 1/17/2019   Little interest or pleasure in doing things Not at all   Feeling down, depressed, irritable, or hopeless Not at all   Total Score PHQ 2 0       Learning Assessment:  Learning Assessment 3/16/2018   PRIMARY LEARNER Patient   BARRIERS PRIMARY LEARNER NONE   PRIMARY LANGUAGE ENGLISH   LEARNER PREFERENCE PRIMARY LISTENING   ANSWERED BY patient   RELATIONSHIP SELF       Abuse Screening:  Abuse Screening Questionnaire 10/17/2018   Do you ever feel afraid of your partner? N   Are you in a relationship with someone who physically or mentally threatens you? N   Is it safe for you to go home? Y       Fall Risk  Fall Risk Assessment, last 12 mths 12/2/2019   Able to walk? Yes   Fall in past 12 months? No         Coordination of Care:  1. Have you been to the ER, urgent care clinic since your last visit? Hospitalized since your last visit? no    2. Have you seen or consulted any other health care providers outside of the [address] [place] [address] since your last visit? Include any pap smears or colon screening.  Yes, Dr. Dena Hannah MD.

## 2019-12-02 NOTE — PATIENT INSTRUCTIONS
Thank you for choosing [facility] and [facility] for your  
 
care. You may receive a survey about your visit. We appreciate you taking time  
 
to complete this survey as we use your feedback to improve our services. We  
 
realize we are not perfect, but we strive to provide excellent care.

## 2019-12-16 ENCOUNTER — OFFICE VISIT (OUTPATIENT)
Dept: FAMILY MEDICINE CLINIC | Age: 69
End: 2019-12-16

## 2019-12-16 VITALS
HEIGHT: 65 IN | OXYGEN SATURATION: 99 % | HEART RATE: 72 BPM | BODY MASS INDEX: 17.16 KG/M2 | SYSTOLIC BLOOD PRESSURE: 136 MMHG | RESPIRATION RATE: 16 BRPM | WEIGHT: 103 LBS | DIASTOLIC BLOOD PRESSURE: 71 MMHG | TEMPERATURE: 96.9 F

## 2019-12-16 DIAGNOSIS — E78.5 DYSLIPIDEMIA: ICD-10-CM

## 2019-12-16 DIAGNOSIS — R20.2 NUMBNESS AND TINGLING: ICD-10-CM

## 2019-12-16 DIAGNOSIS — I10 ESSENTIAL HYPERTENSION: ICD-10-CM

## 2019-12-16 DIAGNOSIS — I73.9 PERIPHERAL VASCULAR DISEASE (HCC): Primary | ICD-10-CM

## 2019-12-16 DIAGNOSIS — R20.0 NUMBNESS AND TINGLING: ICD-10-CM

## 2019-12-16 NOTE — PROGRESS NOTES
Chief Complaint   Patient presents with    Foot Pain     left foot and leg      1. Have you been to the ER, urgent care clinic since your last visit? Hospitalized since your last visit? No    2. Have you seen or consulted any other health care providers outside of the [address] [place] [address] since your last visit? Include any pap smears or colon screening. No     HPI  Kay Ebbing comes in for f/u care. Foot pain: she has left foot numbness and tingling with pain. It at times feels cold. She denies discoloration of the foot. She has been seen by the vascular surgeon Dr Red Gonzales and will set up appointment for f/u care. Has a h/o intermittent claudication of the LLE and had surgery done in the past. She is also seen by the neurologist for the numbness and tingling and is scheduled to have EMG studies done. She continues to smoke, strongly advised to quit the habit. Patient has HTN, BP is stable and she is on medication. She has dyslipidemia, stable on medication. Past Medical History  Past Medical History:   Diagnosis Date    Arthritis     Blind left eye     [facility] [address]     Glaucoma     High cholesterol     Hip pain     Hypertension        Surgical History  Past Surgical History:   Procedure Laterality Date    COLONOSCOPY N/A 5/9/2018    COLONOSCOPY / polypectomy performed by Le[facility] at [facility] ENDOSCOPY    HX COLONOSCOPY  2008    hx of polyps    HX MASTECTOMY Left     VASCULAR SURGERY PROCEDURE UNLIST Bilateral     vein cleaning to help with nerves        Medications  Current Outpatient Medications   Medication Sig Dispense Refill    cyclobenzaprine (FLEXERIL) 10 mg tablet Take 1 Tab by mouth three (3) times daily as needed for Muscle Spasm(s). 60 Tab 3    amLODIPine (NORVASC) 10 mg tablet TAKE 1 TABLET BY MOUTH DAILY 90 Tab 1    valsartan (DIOVAN) 160 mg tablet Take 1 Tab by mouth daily. 90 Tab 1    atorvastatin (LIPITOR) 80 mg tablet Take 1 Tab by mouth daily.  90 Tab 1    meloxicam (MOBIC) 7.5 mg tablet Take 1 Tab by mouth daily as needed for Pain. 90 Tab 0    fluticasone propionate (FLONASE) 50 mcg/actuation nasal spray SHAKE LIQUID AND USE 2 SPRAYS IN EACH NOSTRIL DAILY 1 Bottle 1    aspirin delayed-release 81 mg tablet Take  by mouth daily.  albuterol (VENTOLIN HFA) 90 mcg/actuation inhaler Take 2 Puffs by inhalation every six (6) hours as needed for Wheezing.  1 Inhaler 2    anastrozole (ARIMIDEX) 1 mg tablet TK 1 T PO  QD  0    COMBIGAN 0.2-0.5 % drop ophthalmic solution INSTILL 1 DROP DAILY IN RIGHT EYE  3       Allergies  No Known Allergies    Family History  Family History   Problem Relation Age of Onset    Diabetes Mother     Diabetes Father     Colon Polyps Sister        Social History  Social History     Socioeconomic History    Marital status:      Spouse name: Not on file    Number of children: Not on file    Years of education: Not on file    Highest education level: Not on file   Occupational History    Occupation: Retired   Social Needs    Financial resource strain: Not on file    Food insecurity:     Worry: Not on file     Inability: Not on file   [employer] needs:     Medical: Not on file     Non-medical: Not on file   Tobacco Use    Smoking status: Light Tobacco Smoker     Types: Cigarettes    Smokeless tobacco: Never Used   Substance and Sexual Activity    Alcohol use: Yes     Comment: occ    Drug use: Yes     Types: Prescription    Sexual activity: Never   Lifestyle    Physical activity:     Days per week: Not on file     Minutes per session: Not on file    Stress: Not on file   Relationships    Social connections:     Talks on phone: Not on file     Gets together: Not on file     Attends Pentecostalism service: Not on file     Active member of club or organization: Not on file     Attends meetings of clubs or organizations: Not on file     Relationship status: Not on file    Intimate partner violence:     Fear of current or ex partner: Not on file     Emotionally abused: Not on file     Physically abused: Not on file     Forced sexual activity: Not on file   Other Topics Concern     Service No    Blood Transfusions No    Caffeine Concern No    Occupational Exposure No    Hobby Hazards No    Sleep Concern No    Stress Concern No    Weight Concern No    Special Diet No    Back Care No    Exercise No    Bike Helmet No    Seat Belt Yes    Self-Exams Yes   Social History Narrative    Not on file       Review of Systems  Review of Systems - Review of all systems is negative except as noted above in the HPI. Vital Signs  Visit Vitals  /71 (BP 1 Location: Left arm, BP Patient Position: Sitting)   Pulse 72   Temp 96.9 °F (36.1 °C) (Oral)   Resp 16   Ht 5' 5\" (1.651 m)   Wt 103 lb (46.7 kg)   SpO2 99%   BMI 17.14 kg/m²         Physical Exam  Physical Examination: General appearance - oriented to person, place, and time and acyanotic, in no respiratory distress  Mental status - alert, oriented to person, place, and time, affect appropriate to mood  Chest - clear to auscultation, no wheezes, rales or rhonchi, symmetric air entry  Heart - S1 and S2 normal  Back exam - limited range of motion  Neurological - neck supple without rigidity, abnormal neurological exam unchanged from prior examinations  Musculoskeletal - osteoarthritic changes noted in both hands  Extremities -no obvious discoloration left lower extremity but has diminished dorsalis pedis pulse.     Results  Results for orders placed or performed during the hospital encounter of 10/22/19   TSH 3RD GENERATION   Result Value Ref Range    TSH 0.64 0.36 - 9.28 uIU/mL   METABOLIC PANEL, COMPREHENSIVE   Result Value Ref Range    Sodium 144 136 - 145 mmol/L    Potassium 4.0 3.5 - 5.5 mmol/L    Chloride 106 100 - 111 mmol/L    CO2 28 21 - 32 mmol/L    Anion gap 10 3.0 - 18 mmol/L    Glucose 115 (H) 74 - 99 mg/dL    BUN 14 7.0 - 18 MG/DL    Creatinine 0.70 0.6 - 1.3 MG/DL    BUN/Creatinine ratio 20 12 - 20      GFR est AA >60 >60 ml/min/1.73m2    GFR est non-AA >60 >60 ml/min/1.73m2    Calcium 10.1 8.5 - 10.1 MG/DL    Bilirubin, total 1.1 (H) 0.2 - 1.0 MG/DL    ALT (SGPT) 17 13 - 56 U/L    AST (SGOT) 11 10 - 38 U/L    Alk. phosphatase 67 45 - 117 U/L    Protein, total 7.0 6.4 - 8.2 g/dL    Albumin 4.1 3.4 - 5.0 g/dL    Globulin 2.9 2.0 - 4.0 g/dL    A-G Ratio 1.4 0.8 - 1.7     CBC WITH AUTOMATED DIFF   Result Value Ref Range    WBC 5.5 4.6 - 13.2 K/uL    RBC 4.17 (L) 4.20 - 5.30 M/uL    HGB 12.5 12.0 - 16.0 g/dL    HCT 38.0 35.0 - 45.0 %    MCV 91.1 74.0 - 97.0 FL    MCH 30.0 24.0 - 34.0 PG    MCHC 32.9 31.0 - 37.0 g/dL    RDW 13.7 11.6 - 14.5 %    PLATELET 471 494 - 287 K/uL    MPV 9.7 9.2 - 11.8 FL    NEUTROPHILS 61 40 - 73 %    LYMPHOCYTES 33 21 - 52 %    MONOCYTES 4 3 - 10 %    EOSINOPHILS 1 0 - 5 %    BASOPHILS 1 0 - 2 %    ABS. NEUTROPHILS 3.3 1.8 - 8.0 K/UL    ABS. LYMPHOCYTES 1.8 0.9 - 3.6 K/UL    ABS. MONOCYTES 0.2 0.05 - 1.2 K/UL    ABS. EOSINOPHILS 0.1 0.0 - 0.4 K/UL    ABS. BASOPHILS 0.0 0.0 - 0.1 K/UL    DF AUTOMATED         ASSESSMENT and PLAN    ICD-10-CM ICD-9-CM    1. Peripheral vascular disease (HCC) I73.9 443.9    2. Numbness and tingling R20.0 782.0     R20.2     3. Essential hypertension I10 401.9    4. Dyslipidemia E78.5 272.4      reviewed diet, exercise and weight control  cardiovascular risk and specific lipid/LDL goals reviewed  reviewed medications and side effects in detail      I have discussed the diagnosis with the patient and the intended plan of care as seen in the above orders. The patient has received an after-visit summary and questions were answered concerning future plans. I have discussed medication, side effects, and warnings with the patient in detail. The patient verbalized understanding and is in agreement with the plan of care. The patient will contact the office with any additional concerns.     Tona Brittle, MD    PLEASE NOTE: This document has been produced using voice recognition software.  Unrecognized errors in transcription may be present

## 2019-12-27 ENCOUNTER — TELEPHONE (OUTPATIENT)
Dept: FAMILY MEDICINE CLINIC | Age: 69
End: 2019-12-27

## 2019-12-27 NOTE — TELEPHONE ENCOUNTER
Patient called asking about her referral to Dr. Verena Beltre (Vascular) She says she called them and they haven't received anything from our office.  Please place referral.

## 2019-12-30 NOTE — TELEPHONE ENCOUNTER
Spoke with patient at this time and patient states she called  office at this time and they will not scheduled her an appointment without referral from provider. Please place referral at this time. Patient concern was discuss at last visit

## 2019-12-31 DIAGNOSIS — I73.9 PERIPHERAL VASCULAR DISEASE (HCC): Primary | ICD-10-CM

## 2020-01-30 ENCOUNTER — TELEPHONE (OUTPATIENT)
Dept: NEUROLOGY | Age: 70
End: 2020-01-30

## 2020-02-10 ENCOUNTER — HOSPITAL ENCOUNTER (OUTPATIENT)
Dept: LAB | Age: 70
Discharge: HOME | End: 2020-02-10
Payer: COMMERCIAL

## 2020-02-10 ENCOUNTER — OFFICE VISIT (OUTPATIENT)
Dept: FAMILY MEDICINE CLINIC | Age: 70
End: 2020-02-10

## 2020-02-10 VITALS
TEMPERATURE: 97.6 F | SYSTOLIC BLOOD PRESSURE: 135 MMHG | BODY MASS INDEX: 17 KG/M2 | OXYGEN SATURATION: 98 % | RESPIRATION RATE: 16 BRPM | WEIGHT: 102 LBS | HEIGHT: 65 IN | DIASTOLIC BLOOD PRESSURE: 79 MMHG | HEART RATE: 72 BPM

## 2020-02-10 DIAGNOSIS — Z71.89 ACP (ADVANCE CARE PLANNING): ICD-10-CM

## 2020-02-10 DIAGNOSIS — I10 ESSENTIAL HYPERTENSION: ICD-10-CM

## 2020-02-10 DIAGNOSIS — E78.5 DYSLIPIDEMIA: ICD-10-CM

## 2020-02-10 DIAGNOSIS — I10 ESSENTIAL HYPERTENSION: Primary | ICD-10-CM

## 2020-02-10 DIAGNOSIS — J30.0 VASOMOTOR RHINITIS: ICD-10-CM

## 2020-02-10 DIAGNOSIS — I73.9 PERIPHERAL VASCULAR DISEASE (HCC): ICD-10-CM

## 2020-02-10 DIAGNOSIS — Z00.00 ENCOUNTER FOR MEDICARE ANNUAL WELLNESS EXAM: ICD-10-CM

## 2020-02-10 PROCEDURE — 85025 COMPLETE CBC W/AUTO DIFF WBC: CPT

## 2020-02-10 PROCEDURE — 80053 COMPREHEN METABOLIC PANEL: CPT

## 2020-02-10 PROCEDURE — 80061 LIPID PANEL: CPT

## 2020-02-10 RX ORDER — LORATADINE 10 MG/1
10 TABLET ORAL
Qty: 30 TAB | Refills: 3 | Status: SHIPPED | OUTPATIENT
Start: 2020-02-10 | End: 2020-07-17 | Stop reason: SDUPTHER

## 2020-02-10 NOTE — PATIENT INSTRUCTIONS
Medicare Part B Preventive Services Limitations Recommendation Scheduled Bone Mass Measurement 
(age 72 & older, biennial) A bone mass density test is recommended when a woman turns 65 to screen for osteoporosis. This test is only recommended one time, as a screening. Some providers will use this same test as a disease monitoring tool if you already have osteoporosis. utd Cardiovascular Screening Blood Tests (every 5 years) Total cholesterol, HDL, Triglycerides and ECG Order blood work  as a panel if possible and adults with routine risk  an electrocardiogram (ECG) at intervals determined by your doctor. Due    
Colorectal Cancer Screening 
-Fecal occult blood test (annual) -Flexible sigmoidoscopy (5y) 
-Screening colonoscopy (10y) -Barium Enema Colorectal cancer screenings should be done for adults age 54-65 with no increased risk factors for colorectal cancer. There are a number of acceptable methods of screening for this type of cancer. Each test has its own benefits and drawbacks. Discuss with your doctor what is most appropriate for you during your annual wellness visit. The different tests include: colonoscopy (considered the best screening method), a fecal occult blood test, a fecal DNA test, and sigmoidoscopy. utd Counseling to Prevent Tobacco Use (up to 8 sessions per year) - Counseling greater than 3 and up to 10 minutes - Counseling greater than 10 minutes Patients must be asymptomatic of tobacco-related conditions to receive as preventive service Current smoker Diabetes Screening Tests (at least every 3 years, Medicare covers annually or at 6-month intervals for prediabetic patients) Fasting blood sugar (FBS) or glucose tolerance test (GTT) -All adults age 38-68 who are overweight should have a diabetes screening test once every three years.  
-Other screening tests and preventive services for persons with diabetes include: an eye exam to screen for diabetic retinopathy, a kidney function test, a foot exam, and stricter control over your cholesterol. Diabetes Self-Management Training (DSMT) (no USPSTF recommendation) Requires referral by treating physician for patient with diabetes or renal disease. 10 hours of initial DSMT session of no less than 30 minutes each in a continuous 12-month period. 2 hours of follow-up DSMT in subsequent years. N/a Glaucoma Screening (no USPSTF recommendation) Diabetes mellitus, family history, , age 48 or over,  American, age 72 or over utd Human Immunodeficiency Virus (HIV) Screening (annually for increased risk patients) HIV-1 and HIV-2 by EIA, ANGELA, rapid antibody test, or oral mucosa transudate Patient must be at increased risk for HIV infection per USPSTF guidelines or pregnant. Tests covered annually for patients at increased risk. Pregnant patients may receive up to 3 test during pregnancy. N/a Medical Nutrition Therapy (MNT) (for diabetes or renal disease not recommended schedule) Requires referral by treating physician for patient with diabetes or renal disease. Can be provided in same year as diabetes self-management training (DSMT), and CMS recommends medical nutrition therapy take place after DSMT. Up to 3 hours for initial year and 2 hours in subsequent years. N/a Shingles Vaccination A shingles vaccine is also recommended once in a lifetime after age 61 Due Seasonal Influenza Vaccination (annually) All adults should have a flu vaccine yearly  utd Pneumococcal Vaccination (once after 72) All adults over the age of 72 should receive the recommended pneumonia vaccines. Current USPSTF guidelines recommend a series of two vaccines for the best pneumonia protection. utd Hepatitis B Vaccinations (if medium/high risk) Medium/high risk factors:  End-stage renal disease, 
 Hemophiliacs who received Factor VIII or IX concentrates, Clients of institutions for the mentally retarded, Persons who live in the same house as a HepB virus carrier, Homosexual men, Illicit injectable drug abusers. N/a Screening Mammography (biennial age 54-69) Breast cancer screenings are recommended every other year for women of normal risk, age 54-69. Mammogram scheduled 02/2020 Screening Pap Tests and Pelvic Examination (up to age 79 and after 79 if unknown history or abnormal study last 10 years) Cervical cancer screenings for women over age 72 are only recommended with certain risk factors N/a Hepatitis C All adults born between [facility] should be screened once  utd Tetanus  All adults should have a tetanus vaccine every 10 years Due

## 2020-02-10 NOTE — ACP (ADVANCE CARE PLANNING)
Advance Care Planning (ACP) Provider Conversation Snapshot    Date of ACP Conversation: 02/10/20  Persons included in Conversation:  patient  Length of ACP Conversation in minutes:  <16 minutes (Non-Billable)    Authorized Decision Maker (if patient is incapable of making informed decisions): This person is:   daughter, she will fill out forms given and bring these back in for scanning into the EMR chart. For Patients with Decision Making Capacity:   Values/Goals: Exploration of values, goals, and preferences if recovery is not expected, even with continued medical treatment in the event of:  Imminent death  Severe, permanent brain injury  \"In these circumstances, what matters most to you? \"  Care focused more on comfort or quality of life.     Conversation Outcomes / Follow-Up Plan:   Recommended completion of Advance Directive form after review of ACP materials and conversation with prospective healthcare agent      Blanche Lambert Ace, MD

## 2020-02-10 NOTE — PROGRESS NOTES
Labs ordered by PCP at this time. Patient tolerated well at this time. Venipuncture to left forearm at this time. Labs will be sent to [facility] for testing

## 2020-02-10 NOTE — PROGRESS NOTES
HPI  Srikanth Gallegos comes in for f/u care. Allergy: she has nasal congestion and sneezing. Would like allergy medication. I will send in claritin. HTN: She is on medication. I will check labs today. She takes Norvasc and Diovan. Blood pressure is stable. She denies headache, changes in vision or focal weakness. Dyslipidemia: stable on medication. She is on Lipitor. Tolerating the medication. Breast cancer: she has had left mastectomy and is f/u by the breast specialist. She is on arimidex. PVD; Scheduled to see the vascular specialist, she has occasional claudication symptoms lower extremities. Past Medical History  Past Medical History:   Diagnosis Date    Arthritis     Blind left eye     Breast cancer ([facility] [address]     Glaucoma     High cholesterol     Hip pain     Hypertension        Surgical History  Past Surgical History:   Procedure Laterality Date    COLONOSCOPY N/A 5/9/2018    COLONOSCOPY / polypect[facility] at [facility]COPY    HX COLONOSCOPY  2008    hx of polyps    HX MASTECTOMY Left     VASCULAR SURGERY PROCEDURE UNLIST Bilateral     vein cleaning to help with nerves        Medications  Current Outpatient Medications   Medication Sig Dispense Refill    cyclobenzaprine (FLEXERIL) 10 mg tablet Take 1 Tab by mouth three (3) times daily as needed for Muscle Spasm(s). 60 Tab 3    amLODIPine (NORVASC) 10 mg tablet TAKE 1 TABLET BY MOUTH DAILY 90 Tab 1    valsartan (DIOVAN) 160 mg tablet Take 1 Tab by mouth daily. 90 Tab 1    atorvastatin (LIPITOR) 80 mg tablet Take 1 Tab by mouth daily. 90 Tab 1    meloxicam (MOBIC) 7.5 mg tablet Take 1 Tab by mouth daily as needed for Pain. 90 Tab 0    fluticasone propionate (FLONASE) 50 mcg/actuation nasal spray SHAKE LIQUID AND USE 2 SPRAYS IN EACH NOSTRIL DAILY 1 Bottle 1    aspirin delayed-release 81 mg tablet Take  by mouth daily.       albuterol (VENTOLIN HFA) 90 mcg/actuation inhaler Take 2 Puffs by inhalation every six (6) hours as needed for Wheezing. 1 Inhaler 2    anastrozole (ARIMIDEX) 1 mg tablet TK 1 T PO  QD  0    COMBIGAN 0.2-0.5 % drop ophthalmic solution INSTILL 1 DROP DAILY IN RIGHT EYE  3       Allergies  No Known Allergies    Family History  Family History   Problem Relation Age of Onset    Diabetes Mother     Diabetes Father     Colon Polyps Sister        Social History  Social History     Socioeconomic History    Marital status:      Spouse name: Not on file    Number of children: Not on file    Years of education: Not on file    Highest education level: Not on file   Occupational History    Occupation: Retired   Social Needs    Financial resource strain: Not on file    Food insecurity:     Worry: Not on file     Inability: Not on file   Whois needs:     Medical: Not on file     Non-medical: Not on file   Tobacco Use    Smoking status: Light Tobacco Smoker     Types: Cigarettes    Smokeless tobacco: Never Used   Substance and Sexual Activity    Alcohol use:  Yes     Alcohol/week: 1.0 standard drinks     Types: 1 Cans of beer per week     Comment: occ    Drug use: Yes     Types: Prescription    Sexual activity: Never   Lifestyle    Physical activity:     Days per week: Not on file     Minutes per session: Not on file    Stress: Not on file   Relationships    Social connections:     Talks on phone: Not on file     Gets together: Not on file     Attends Quaker service: Not on file     Active member of club or organization: Not on file     Attends meetings of clubs or organizations: Not on file     Relationship status: Not on file    Intimate partner violence:     Fear of current or ex partner: Not on file     Emotionally abused: Not on file     Physically abused: Not on file     Forced sexual activity: Not on file   Other Topics Concern     Service No    Blood Transfusions No    Caffeine Concern No    Occupational Exposure No    Hobby Hazards No    Sleep Concern No  Stress Concern No    Weight Concern No    Special Diet No    Back Care No    Exercise No    Bike Helmet No    Seat Belt Yes    Self-Exams Yes   Social History Narrative    Not on file       Review of Systems  Review of Systems - Review of all systems is negative except as noted above in the HPI. Vital Signs  Visit Vitals  /79 (BP 1 Location: Left arm, BP Patient Position: Sitting)   Pulse 72   Temp 97.6 °F (36.4 °C) (Oral)   Resp 16   Ht 5' 5\" (1.651 m)   Wt 102 lb (46.3 kg)   SpO2 98%   BMI 16.97 kg/m²         Physical Exam  Physical Examination: General appearance - acyanotic, in no respiratory distress  Mental status - affect appropriate to mood  Nose - mucosal congestion and mucosal erythema  Mouth - mucous membranes moist, pharynx normal without lesions  Neck - supple, no significant adenopathy  Lymphatics - no palpable lymphadenopathy  Chest - no tachypnea, retractions or cyanosis, decreased air entry noted bilateral lung bases  Heart - S1 and S2 normal  Abdomen - no rebound tenderness noted  Neurological - abnormal neurological exam unchanged from prior examinations  Musculoskeletal - osteoarthritic changes noted in both hands  Extremities - intact peripheral pulses    Results  Results for orders placed or performed during the hospital encounter of 10/22/19   TSH 3RD GENERATION   Result Value Ref Range    TSH 0.64 0.36 - 6.22 uIU/mL   METABOLIC PANEL, COMPREHENSIVE   Result Value Ref Range    Sodium 144 136 - 145 mmol/L    Potassium 4.0 3.5 - 5.5 mmol/L    Chloride 106 100 - 111 mmol/L    CO2 28 21 - 32 mmol/L    Anion gap 10 3.0 - 18 mmol/L    Glucose 115 (H) 74 - 99 mg/dL    BUN 14 7.0 - 18 MG/DL    Creatinine 0.70 0.6 - 1.3 MG/DL    BUN/Creatinine ratio 20 12 - 20      GFR est AA >60 >60 ml/min/1.73m2    GFR est non-AA >60 >60 ml/min/1.73m2    Calcium 10.1 8.5 - 10.1 MG/DL    Bilirubin, total 1.1 (H) 0.2 - 1.0 MG/DL    ALT (SGPT) 17 13 - 56 U/L    AST (SGOT) 11 10 - 38 U/L    Alk. phosphatase 67 45 - 117 U/L    Protein, total 7.0 6.4 - 8.2 g/dL    Albumin 4.1 3.4 - 5.0 g/dL    Globulin 2.9 2.0 - 4.0 g/dL    A-G Ratio 1.4 0.8 - 1.7     CBC WITH AUTOMATED DIFF   Result Value Ref Range    WBC 5.5 4.6 - 13.2 K/uL    RBC 4.17 (L) 4.20 - 5.30 M/uL    HGB 12.5 12.0 - 16.0 g/dL    HCT 38.0 35.0 - 45.0 %    MCV 91.1 74.0 - 97.0 FL    MCH 30.0 24.0 - 34.0 PG    MCHC 32.9 31.0 - 37.0 g/dL    RDW 13.7 11.6 - 14.5 %    PLATELET 989 460 - 362 K/uL    MPV 9.7 9.2 - 11.8 FL    NEUTROPHILS 61 40 - 73 %    LYMPHOCYTES 33 21 - 52 %    MONOCYTES 4 3 - 10 %    EOSINOPHILS 1 0 - 5 %    BASOPHILS 1 0 - 2 %    ABS. NEUTROPHILS 3.3 1.8 - 8.0 K/UL    ABS. LYMPHOCYTES 1.8 0.9 - 3.6 K/UL    ABS. MONOCYTES 0.2 0.05 - 1.2 K/UL    ABS. EOSINOPHILS 0.1 0.0 - 0.4 K/UL    ABS. BASOPHILS 0.0 0.0 - 0.1 K/UL    DF AUTOMATED         ASSESSMENT and PLAN    ICD-10-CM ICD-9-CM    1. Essential hypertension I10 401.9 LIPID PANEL      CBC WITH AUTOMATED DIFF      METABOLIC PANEL, COMPREHENSIVE      COLLECTION VENOUS BLOOD,VENIPUNCTURE   2. Dyslipidemia E78.5 272.4 COLLECTION VENOUS BLOOD,VENIPUNCTURE   3. Vasomotor rhinitis J30.0 477.9    4. Peripheral vascular disease (HCC) I73.9 443.9 COLLECTION VENOUS BLOOD,VENIPUNCTURE     lab results and schedule of future lab studies reviewed with patient  reviewed diet, exercise and weight control  cardiovascular risk and specific lipid/LDL goals reviewed  reviewed medications and side effects in detail    I have discussed the diagnosis with the patient and the intended plan of care as seen in the above orders. The patient has received an after-visit summary and questions were answered concerning future plans. I have discussed medication, side effects, and warnings with the patient in detail. The patient verbalized understanding and is in agreement with the plan of care. The patient will contact the office with any additional concerns.     Hesed Sallye Kocher, MD    PLEASE NOTE:   This document has been produced using voice recognition software.  Unrecognized errors in transcription may be present

## 2020-02-10 NOTE — PROGRESS NOTES
Chief Complaint   Patient presents with    Annual Wellness Visit    Allergies     request medication      1. Have you been to the ER, urgent care clinic since your last visit? Hospitalized since your last visit? No    2. Have you seen or consulted any other health care providers outside of the [address] [place] [address] since your last visit? Include any pap smears or colon screening. No  This is the Subsequent Medicare Annual Wellness Exam, performed 12 months or more after the Initial AWV or the last Subsequent AWV    I have reviewed the patient's medical history in detail and updated the computerized patient record. History   Elva Marie comes in for medicare wellness exam.    Patient Active Problem List   Diagnosis Code    History of breast cancer Z85.3    H/O left mastectomy Z90.12    Dyslipidemia E78.5    Essential hypertension I10    Pain of right hip joint M25.551    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    Blind left eye H54.40    Peripheral vascular disease ([facility] [address] I73.9     Past Medical History:   Diagnosis Date    Arthritis     Blind left eye     Breast cancer ([facility] [address]     Glaucoma     High cholesterol     Hip pain     Hypertension       Past Surgical History:   Procedure Laterality Date    COLONOSCOPY N/A 5/9/2018    COLONOSCOPY / [facility]pect[facility] ENDOSCOPY    HX COLONOSCOPY  2008    hx of polyps    HX MASTECTOMY Left     VASCULAR SURGERY PROCEDURE UNLIST Bilateral     vein cleaning to help with nerves     Current Outpatient Medications   Medication Sig Dispense Refill    cyclobenzaprine (FLEXERIL) 10 mg tablet Take 1 Tab by mouth three (3) times daily as needed for Muscle Spasm(s). 60 Tab 3    amLODIPine (NORVASC) 10 mg tablet TAKE 1 TABLET BY MOUTH DAILY 90 Tab 1    valsartan (DIOVAN) 160 mg tablet Take 1 Tab by mouth daily. 90 Tab 1    atorvastatin (LIPITOR) 80 mg tablet Take 1 Tab by mouth daily.  90 Tab 1    meloxicam (MOBIC) 7.5 mg tablet Take 1 Tab by mouth daily as needed for Pain. 90 Tab 0    fluticasone propionate (FLONASE) 50 mcg/actuation nasal spray SHAKE LIQUID AND USE 2 SPRAYS IN EACH NOSTRIL DAILY 1 Bottle 1    aspirin delayed-release 81 mg tablet Take  by mouth daily.  albuterol (VENTOLIN HFA) 90 mcg/actuation inhaler Take 2 Puffs by inhalation every six (6) hours as needed for Wheezing. 1 Inhaler 2    anastrozole (ARIMIDEX) 1 mg tablet TK 1 T PO  QD  0    COMBIGAN 0.2-0.5 % drop ophthalmic solution INSTILL 1 DROP DAILY IN RIGHT EYE  3     No Known Allergies    Family History   Problem Relation Age of Onset    Diabetes Mother     Diabetes Father     Colon Polyps Sister      Social History     Tobacco Use    Smoking status: Light Tobacco Smoker     Types: Cigarettes    Smokeless tobacco: Never Used   Substance Use Topics    Alcohol use: Yes     Alcohol/week: 1.0 standard drinks     Types: 1 Cans of beer per week     Comment: occ       Depression Risk Factor Screening:     3 most recent PHQ Screens 2/10/2020   Little interest or pleasure in doing things Not at all   Feeling down, depressed, irritable, or hopeless Not at all   Total Score PHQ 2 0       Alcohol Risk Factor Screening:   Do you average 1 drink per night or more than 7 drinks a week:  No    On any one occasion in the past three months have you have had more than 3 drinks containing alcohol:  No      Functional Ability and Level of Safety:   1. Was the patient's timed Up and GO test unsteady or longer than 30 seconds? Yes  2. Does the patient need help with the phone, transportation, shopping, preparing meals, housework, laundry, medications or managing money? Yes  3. Does the patients' home have rugs in the hallway, lack grab bars in the bathroom, lack handrails on the stairs or have poor lighting? No  4. Have you noticed any hearing difficulties? No  Hearing Evaluation:    Hearing: Hearing is good. Activities of Daily Living:   The home contains: no safety equipment. Patient does total self care    Ambulation: with difficulty, uses a cane    Fall Risk:  Fall Risk Assessment, last 12 mths 2/10/2020   Able to walk? Yes   Fall in past 12 months? No       Abuse Screen:  Patient is not abused    Cognitive Screening   Has your family/caregiver stated any concerns about your memory: no  Cognitive Screening: Normal - Verbal Fluency Test    Patient Care Team   Patient Care Team:  Pallavi Gu MD as PCP - General (Family Practice)  Pallavi Gu MD as PCP - [facility] Provider  Lisa Leyva MD (Ophthalmology)  Bria Randall MD (Surgery)  Enrike Mead MD (Hematology and Oncology)  Zev Dickson MD as Consulting Provider (Neurology)    Assessment/Plan   Education and counseling provided:  Are appropriate based on today's review and evaluation  Cardiovascular screening blood test    1. Encounter for Medicare annual wellness exam    2. ACP (advance care planning)    Health Maintenance Due   Topic Date Due    DTaP/Tdap/Td series (1 - Tdap) 12/08/1960    Shingrix Vaccine Age 50> (1 of 2) 12/08/1999    Lipid Screen  01/17/2020    Medicare Yearly Exam  01/18/2020    Breast Cancer Screen Mammogram  01/28/2020     I have discussed the diagnosis with the patient and the intended plan of care as seen in the above orders. The patient has received an after-visit summary and questions were answered concerning future plans. I have discussed medication, side effects, and warnings with the patient in detail. The patient verbalized understanding and is in agreement with the plan of care. The patient will contact the office with any additional concerns. Personalized preventative plan of care was discussed, printed and given to patient.     Ana Trinidad MD

## 2020-02-11 LAB
ALBUMIN SERPL-MCNC: 4.3 G/DL (ref 3.4–5)
ALBUMIN/GLOB SERPL: 1.3 {RATIO} (ref 0.8–1.7)
ALP SERPL-CCNC: 72 U/L (ref 45–117)
ALT SERPL-CCNC: 28 U/L (ref 13–56)
ANION GAP SERPL CALC-SCNC: 6 MMOL/L (ref 3–18)
AST SERPL-CCNC: 23 U/L (ref 10–38)
BASOPHILS # BLD: 0 K/UL (ref 0–0.1)
BASOPHILS NFR BLD: 0 % (ref 0–2)
BILIRUB SERPL-MCNC: 1.4 MG/DL (ref 0.2–1)
BUN SERPL-MCNC: 22 MG/DL (ref 7–18)
BUN/CREAT SERPL: 31 (ref 12–20)
CALCIUM SERPL-MCNC: 10.3 MG/DL (ref 8.5–10.1)
CHLORIDE SERPL-SCNC: 106 MMOL/L (ref 100–111)
CHOLEST SERPL-MCNC: 221 MG/DL
CO2 SERPL-SCNC: 29 MMOL/L (ref 21–32)
CREAT SERPL-MCNC: 0.7 MG/DL (ref 0.6–1.3)
DIFFERENTIAL METHOD BLD: ABNORMAL
EOSINOPHIL # BLD: 0.1 K/UL (ref 0–0.4)
EOSINOPHIL NFR BLD: 2 % (ref 0–5)
ERYTHROCYTE [DISTWIDTH] IN BLOOD BY AUTOMATED COUNT: 13.4 % (ref 11.6–14.5)
GLOBULIN SER CALC-MCNC: 3.2 G/DL (ref 2–4)
GLUCOSE SERPL-MCNC: 103 MG/DL (ref 74–99)
HCT VFR BLD AUTO: 35.8 % (ref 35–45)
HDLC SERPL-MCNC: 83 MG/DL (ref 40–60)
HDLC SERPL: 2.7 {RATIO} (ref 0–5)
HGB BLD-MCNC: 12 G/DL (ref 12–16)
LDLC SERPL CALC-MCNC: 114 MG/DL (ref 0–100)
LIPID PROFILE,FLP: ABNORMAL
LYMPHOCYTES # BLD: 3.5 K/UL (ref 0.9–3.6)
LYMPHOCYTES NFR BLD: 40 % (ref 21–52)
MCH RBC QN AUTO: 29.4 PG (ref 24–34)
MCHC RBC AUTO-ENTMCNC: 33.5 G/DL (ref 31–37)
MCV RBC AUTO: 87.7 FL (ref 74–97)
MONOCYTES # BLD: 0.7 K/UL (ref 0.05–1.2)
MONOCYTES NFR BLD: 8 % (ref 3–10)
NEUTS SEG # BLD: 4.2 K/UL (ref 1.8–8)
NEUTS SEG NFR BLD: 50 % (ref 40–73)
PLATELET # BLD AUTO: 447 K/UL (ref 135–420)
PMV BLD AUTO: 10.2 FL (ref 9.2–11.8)
POTASSIUM SERPL-SCNC: 4.1 MMOL/L (ref 3.5–5.5)
PROT SERPL-MCNC: 7.5 G/DL (ref 6.4–8.2)
RBC # BLD AUTO: 4.08 M/UL (ref 4.2–5.3)
SODIUM SERPL-SCNC: 141 MMOL/L (ref 136–145)
TRIGL SERPL-MCNC: 120 MG/DL (ref ?–150)
VLDLC SERPL CALC-MCNC: 24 MG/DL
WBC # BLD AUTO: 8.5 K/UL (ref 4.6–13.2)

## 2020-02-12 NOTE — PROGRESS NOTES
Please let patient know her LDL cholesterol is elevated at 114. She is on Lipitor. She should intensify lifestyle and dietary modification. She should exercise and take a diet low in polysaturated fats. We would want a number to go down to below 100. Recheck labs in 3 months.   Levi Velasco MD

## 2020-04-28 ENCOUNTER — TELEPHONE (OUTPATIENT)
Dept: FAMILY MEDICINE CLINIC | Age: 70
End: 2020-04-28

## 2020-04-28 RX ORDER — METHYLPREDNISOLONE 4 MG/1
TABLET ORAL
Qty: 1 DOSE PACK | Refills: 0 | Status: SHIPPED | OUTPATIENT
Start: 2020-04-28 | End: 2020-09-01

## 2020-04-28 NOTE — TELEPHONE ENCOUNTER
Patient called requesting medication for her hip. She can hardly move.  She says he's prescribed her prednisone in the past.

## 2020-07-17 DIAGNOSIS — M25.551 RIGHT HIP PAIN: ICD-10-CM

## 2020-07-17 DIAGNOSIS — R09.81 SINUS CONGESTION: ICD-10-CM

## 2020-07-17 DIAGNOSIS — J30.0 VASOMOTOR RHINITIS: ICD-10-CM

## 2020-07-17 NOTE — TELEPHONE ENCOUNTER
Please see refill request    Flonase (1 X 1) and Meloxicam   (#90 X 0) both filled last on 10-    Claritin filled last on 2-    #30 X 3    Thank you

## 2020-07-17 NOTE — TELEPHONE ENCOUNTER
Requested Prescriptions     Pending Prescriptions Disp Refills    fluticasone propionate (FLONASE) 50 mcg/actuation nasal spray 1 Bottle 1    loratadine (Claritin) 10 mg tablet 30 Tab 3     Sig: Take 1 Tab by mouth daily as needed for Allergies.  meloxicam (MOBIC) 7.5 mg tablet 90 Tab 0     Sig: Take 1 Tab by mouth daily as needed for Pain.

## 2020-07-20 DIAGNOSIS — J30.0 VASOMOTOR RHINITIS: ICD-10-CM

## 2020-07-20 DIAGNOSIS — R09.81 SINUS CONGESTION: ICD-10-CM

## 2020-07-20 RX ORDER — MELOXICAM 7.5 MG/1
7.5 TABLET ORAL
Qty: 90 TAB | Refills: 0 | Status: SHIPPED | OUTPATIENT
Start: 2020-07-20 | End: 2020-11-03 | Stop reason: SDUPTHER

## 2020-07-20 RX ORDER — LORATADINE 10 MG/1
10 TABLET ORAL
Qty: 30 TAB | Refills: 3 | Status: SHIPPED | OUTPATIENT
Start: 2020-07-20 | End: 2020-09-01

## 2020-07-20 RX ORDER — FLUTICASONE PROPIONATE 50 MCG
SPRAY, SUSPENSION (ML) NASAL
Qty: 1 BOTTLE | Refills: 1 | Status: SHIPPED | OUTPATIENT
Start: 2020-07-20 | End: 2020-07-20

## 2020-07-20 RX ORDER — FLUTICASONE PROPIONATE 50 MCG
SPRAY, SUSPENSION (ML) NASAL
Qty: 48 G | Refills: 1 | Status: SHIPPED | OUTPATIENT
Start: 2020-07-20

## 2020-09-01 ENCOUNTER — OFFICE VISIT (OUTPATIENT)
Dept: FAMILY MEDICINE CLINIC | Age: 70
End: 2020-09-01

## 2020-09-01 VITALS
BODY MASS INDEX: 18.28 KG/M2 | WEIGHT: 96.8 LBS | OXYGEN SATURATION: 97 % | TEMPERATURE: 97.1 F | RESPIRATION RATE: 20 BRPM | HEART RATE: 73 BPM | DIASTOLIC BLOOD PRESSURE: 71 MMHG | SYSTOLIC BLOOD PRESSURE: 134 MMHG | HEIGHT: 61 IN

## 2020-09-01 DIAGNOSIS — M79.672 LEFT FOOT PAIN: ICD-10-CM

## 2020-09-01 DIAGNOSIS — M79.671 PAIN IN BOTH FEET: ICD-10-CM

## 2020-09-01 DIAGNOSIS — I73.9 PERIPHERAL VASCULAR DISEASE (HCC): ICD-10-CM

## 2020-09-01 DIAGNOSIS — Z72.0 TOBACCO ABUSE DISORDER: ICD-10-CM

## 2020-09-01 DIAGNOSIS — E78.5 DYSLIPIDEMIA: ICD-10-CM

## 2020-09-01 DIAGNOSIS — M79.672 PAIN IN BOTH FEET: ICD-10-CM

## 2020-09-01 DIAGNOSIS — Z23 ENCOUNTER FOR IMMUNIZATION: ICD-10-CM

## 2020-09-01 DIAGNOSIS — G62.9 NEUROPATHY: ICD-10-CM

## 2020-09-01 DIAGNOSIS — F39 MOOD DISORDER (HCC): ICD-10-CM

## 2020-09-01 DIAGNOSIS — I10 ESSENTIAL HYPERTENSION: Primary | ICD-10-CM

## 2020-09-01 RX ORDER — PREGABALIN 50 MG/1
50 CAPSULE ORAL 2 TIMES DAILY
Qty: 60 CAP | Refills: 2 | Status: SHIPPED | OUTPATIENT
Start: 2020-09-01 | End: 2020-11-03 | Stop reason: SDUPTHER

## 2020-09-01 RX ORDER — IBUPROFEN 200 MG
1 TABLET ORAL EVERY 24 HOURS
Qty: 30 PATCH | Refills: 2 | Status: SHIPPED | OUTPATIENT
Start: 2020-09-01 | End: 2020-10-01

## 2020-09-01 NOTE — PROGRESS NOTES
Cara Gonzalez presents today for   Chief Complaint   Patient presents with    Follow Up Chronic Condition    Foot Pain     X 1 year getting worse   (L)       Is someone accompanying this pt? no    Is the patient using any DME equipment during OV  Yes   cane    Depression Screening:  3 most recent PHQ Screens 9/1/2020   Little interest or pleasure in doing things Not at all   Feeling down, depressed, irritable, or hopeless Not at all   Total Score PHQ 2 0   Trouble falling or staying asleep, or sleeping too much More than half the days   Feeling tired or having little energy More than half the days   Poor appetite, weight loss, or overeating More than half the days   Feeling bad about yourself - or that you are a failure or have let yourself or your family down Not at all   Trouble concentrating on things such as school, work, reading, or watching TV Not at all   Moving or speaking so slowly that other people could have noticed; or the opposite being so fidgety that others notice Not at all   Thoughts of being better off dead, or hurting yourself in some way Not at all   PHQ 9 Score 6   How difficult have these problems made it for you to do your work, take care of your home and get along with others Not difficult at all       Learning Assessment:  Learning Assessment 3/16/2018   PRIMARY LEARNER Patient   BARRIERS PRIMARY LEARNER NONE   PRIMARY LANGUAGE ENGLISH   LEARNER PREFERENCE PRIMARY LISTENING   ANSWERED BY patient   RELATIONSHIP SELF       Abuse Screening:  Abuse Screening Questionnaire 10/17/2018   Do you ever feel afraid of your partner? N   Are you in a relationship with someone who physically or mentally threatens you? N   Is it safe for you to go home? Y       Fall Risk  Fall Risk Assessment, last 12 mths 9/1/2020   Able to walk? Yes   Fall in past 12 months? No       Health Maintenance reviewed and discussed and ordered per Provider.     Health Maintenance Due   Topic Date Due    DTaP/Tdap/Td series (1 - Tdap) 12/08/1970    Shingrix Vaccine Age 50> (1 of 2) 12/08/1999    Flu Vaccine (1) 09/01/2020   . Coordination of Care:  1. Have you been to the ER, urgent care clinic since your last visit? Hospitalized since your last visit? no    2. Have you seen or consulted any other health care providers outside of the [address] [place] [address] since your last visit? Include any pap smears or colon screening.  no

## 2020-09-01 NOTE — PROGRESS NOTES
Flu Vaccine ordered by PCP today. Vaccine given to patient right arm. Patient tolerated well at this time. No other concerns noted at this time. No reaction noted

## 2020-09-01 NOTE — PROGRESS NOTES
YESENIA Hobbs comes in for follow-up care. Foot pain: Patient has left foot pain that has been ongoing for weeks. Occasionally the foot feels very cold and has a purplish discoloration. Pain is mainly on the plantar aspect of the foot distally. It comes when sitting down or when walking. She denies any trauma to the foot. She also has some numbness and tingling on the plantar aspect of the foot. She does take meloxicam for arthritis. I will order a foot x-ray to evaluate for any abnormality. Numbness and tingling: Patient has neuropathy symptoms with numbness and tingling plantar aspect of the foot. When she stands up she feels like there are a lot of pins-and-needles of the foot. She has been seen by neurology in the past.  I will give some Lyrica to take and follow-up at next visit. PAD: Patient has a history of peripheral arterial disease left lower extremity and has had Left SFA atherectomy + DCB done on 9/6/2017. Patient has not followed up with the vascular specialist.  She does need to set up an appointment for follow-up care. Currently the pulses that is the dorsalis pedis and tibialis posterior are felt. Tobacco abuse disorder: Patient continues to smoke. She has been advised to quit the habit. This can result in worsening of PAD symptoms and affect the circulation in the lower extremities. She is willing to try nicotine replacement therapy. I will send in nicotine patch to try. Hypertension: Patient has hypertension. Blood pressure is stable. She denies headache, changes in vision or focal weakness. She is on amlodipine and Diovan. We will continue with these medications. Dyslipidemia: Patient has a history of dyslipidemia and is on Lipitor 80 mg daily. We will continue with this medication. We will recheck lipid panel at next visit. Mood disorder: Patient at times feels depressed. She does not want any medication for depression. PHQ 9 is 6.   We will continue with supportive care and I will follow-up at the next visit. She states that when she is acutely stressed she does smoke. May benefit from Wellbutrin. Health maintenance: Patient will get a flu vaccine today. Past Medical History  Past Medical History:   Diagnosis Date    Arthritis     Blind left eye     Breast cancer ([address]     Glaucoma     High cholesterol     Hip pain     Hypertension        Surgical History  Past Surgical History:   Procedure Laterality Date    COLONOSCOPY N/A 5/9/2018    COLONOSCOPY / [facility]pect[facility]rm[facility]    HX COLONOSCOPY  2008    hx of polyps    HX MASTECTOMY Left     VASCULAR SURGERY PROCEDURE UNLIST Bilateral     vein cleaning to help with nerves        Medications  Current Outpatient Medications   Medication Sig Dispense Refill    nicotine (NICODERM CQ) 21 mg/24 hr 1 Patch by TransDERmal route every twenty-four (24) hours for 30 days. 30 Patch 2    pregabalin (LYRICA) 50 mg capsule Take 1 Cap by mouth two (2) times a day. Max Daily Amount: 100 mg. 60 Cap 2    meloxicam (MOBIC) 7.5 mg tablet Take 1 Tab by mouth daily as needed for Pain. 90 Tab 0    fluticasone propionate (FLONASE) 50 mcg/actuation nasal spray SHAKE LIQUID AND USE 2 SPRAYS IN EACH NOSTRIL DAILY 48 g 1    cyclobenzaprine (FLEXERIL) 10 mg tablet Take 1 Tab by mouth three (3) times daily as needed for Muscle Spasm(s). 60 Tab 3    amLODIPine (NORVASC) 10 mg tablet TAKE 1 TABLET BY MOUTH DAILY 90 Tab 1    valsartan (DIOVAN) 160 mg tablet Take 1 Tab by mouth daily. 90 Tab 1    atorvastatin (LIPITOR) 80 mg tablet Take 1 Tab by mouth daily. 90 Tab 1    aspirin delayed-release 81 mg tablet Take  by mouth daily.  COMBIGAN 0.2-0.5 % drop ophthalmic solution INSTILL 1 DROP DAILY IN RIGHT EYE  3    albuterol (VENTOLIN HFA) 90 mcg/actuation inhaler Take 2 Puffs by inhalation every six (6) hours as needed for Wheezing.  1 Inhaler 2       Allergies  No Known Allergies    Family History  Family History   Problem Relation Age of Onset    Diabetes Mother     Diabetes Father     Colon Polyps Sister        Social History  Social History     Socioeconomic History    Marital status:      Spouse name: Not on file    Number of children: Not on file    Years of education: Not on file    Highest education level: Not on file   Occupational History    Occupation: Retired   Social Needs    Financial resource strain: Not on file    Food insecurity     Worry: Not on file     Inability: Not on file   [employer] needs     Medical: Not on file     Non-medical: Not on file   Tobacco Use    Smoking status: Light Tobacco Smoker     Types: Cigarettes    Smokeless tobacco: Never Used   Substance and Sexual Activity    Alcohol use:  Yes     Alcohol/week: 1.0 standard drinks     Types: 1 Cans of beer per week     Comment: occ    Drug use: Not Currently     Types: Prescription    Sexual activity: Never   Lifestyle    Physical activity     Days per week: Not on file     Minutes per session: Not on file    Stress: Not on file   Relationships    Social connections     Talks on phone: Not on file     Gets together: Not on file     Attends Mandaen service: Not on file     Active member of club or organization: Not on file     Attends meetings of clubs or organizations: Not on file     Relationship status: Not on file    Intimate partner violence     Fear of current or ex partner: Not on file     Emotionally abused: Not on file     Physically abused: Not on file     Forced sexual activity: Not on file   Other Topics Concern     Service No    Blood Transfusions No    Caffeine Concern No    Occupational Exposure No    Hobby Hazards No    Sleep Concern No    Stress Concern No    Weight Concern No    Special Diet No    Back Care No    Exercise No    Bike Helmet No    Seat Belt Yes    Self-Exams Yes   Social History Narrative    Not on file Review of Systems  Review of Systems - Review of all systems is negative except as noted above in the HPI. Vital Signs  Visit Vitals  /71 (BP 1 Location: Right arm, BP Patient Position: Sitting)   Pulse 73   Temp 97.1 °F (36.2 °C) (Oral)   Resp 20   Ht 5' 1\" (1.549 m)   Wt 96 lb 12.8 oz (43.9 kg)   SpO2 97%   BMI 18.29 kg/m²         Physical Exam  Physical Examination: General appearance - oriented to person, place, and time, acyanotic, in no respiratory distress and chronically ill appearing  Mental status - alert, oriented to person, place, and time, affect appropriate to mood  Neck - supple, no significant adenopathy  Lymphatics - no palpable lymphadenopathy  Chest - decreased air entry noted bilateral lung bases  Heart - S1 and S2 normal  Abdomen - no rebound tenderness noted  Back exam - limited range of motion  Neurological - abnormal neurological exam unchanged from prior examinations  Musculoskeletal - osteoarthritic changes noted in both hands, left leg without any discoloration or cyanosis, patient with numbness and tingling at the plantar aspect left foot.   Patient able to flex and extend the toes without any restrictions  Extremities - no pedal edema noted, intact peripheral pulses        Results  Results for orders placed or performed during the hospital encounter of 02/10/20   LIPID PANEL   Result Value Ref Range    LIPID PROFILE          Cholesterol, total 221 (H) <200 MG/DL    Triglyceride 120 <150 MG/DL    HDL Cholesterol 83 (H) 40 - 60 MG/DL    LDL, calculated 114 (H) 0 - 100 MG/DL    VLDL, calculated 24 MG/DL    CHOL/HDL Ratio 2.7 0 - 5.0     CBC WITH AUTOMATED DIFF   Result Value Ref Range    WBC 8.5 4.6 - 13.2 K/uL    RBC 4.08 (L) 4.20 - 5.30 M/uL    HGB 12.0 12.0 - 16.0 g/dL    HCT 35.8 35.0 - 45.0 %    MCV 87.7 74.0 - 97.0 FL    MCH 29.4 24.0 - 34.0 PG    MCHC 33.5 31.0 - 37.0 g/dL    RDW 13.4 11.6 - 14.5 %    PLATELET 938 (H) 256 - 420 K/uL    MPV 10.2 9.2 - 11.8 FL NEUTROPHILS 50 40 - 73 %    LYMPHOCYTES 40 21 - 52 %    MONOCYTES 8 3 - 10 %    EOSINOPHILS 2 0 - 5 %    BASOPHILS 0 0 - 2 %    ABS. NEUTROPHILS 4.2 1.8 - 8.0 K/UL    ABS. LYMPHOCYTES 3.5 0.9 - 3.6 K/UL    ABS. MONOCYTES 0.7 0.05 - 1.2 K/UL    ABS. EOSINOPHILS 0.1 0.0 - 0.4 K/UL    ABS. BASOPHILS 0.0 0.0 - 0.1 K/UL    DF AUTOMATED     METABOLIC PANEL, COMPREHENSIVE   Result Value Ref Range    Sodium 141 136 - 145 mmol/L    Potassium 4.1 3.5 - 5.5 mmol/L    Chloride 106 100 - 111 mmol/L    CO2 29 21 - 32 mmol/L    Anion gap 6 3.0 - 18 mmol/L    Glucose 103 (H) 74 - 99 mg/dL    BUN 22 (H) 7.0 - 18 MG/DL    Creatinine 0.70 0.6 - 1.3 MG/DL    BUN/Creatinine ratio 31 (H) 12 - 20      GFR est AA >60 >60 ml/min/1.73m2    GFR est non-AA >60 >60 ml/min/1.73m2    Calcium 10.3 (H) 8.5 - 10.1 MG/DL    Bilirubin, total 1.4 (H) 0.2 - 1.0 MG/DL    ALT (SGPT) 28 13 - 56 U/L    AST (SGOT) 23 10 - 38 U/L    Alk. phosphatase 72 45 - 117 U/L    Protein, total 7.5 6.4 - 8.2 g/dL    Albumin 4.3 3.4 - 5.0 g/dL    Globulin 3.2 2.0 - 4.0 g/dL    A-G Ratio 1.3 0.8 - 1.7         ASSESSMENT and PLAN    ICD-10-CM ICD-9-CM    1. Essential hypertension  I10 401.9    2. Peripheral vascular disease (HCC)  I73.9 443.9    3. Dyslipidemia  E78.5 272.4    4. Pain in both feet  M79.671 729.5     M79.672     5. Tobacco abuse disorder  Z72.0 305.1 nicotine (NICODERM CQ) 21 mg/24 hr   6. Left foot pain  M79.672 729.5 XR FOOT LT MIN 3 V   7. Neuropathy  G62.9 355.9 pregabalin (LYRICA) 50 mg capsule   8. Encounter for immunization  Z23 V03.89 INFLUENZA VIRUS VACCINE, HIGH DOSE SEASONAL, PRESERVATIVE FREE   9.  Mood disorder (HCC)  F39 296.90      lab results and schedule of future lab studies reviewed with patient  reviewed diet, exercise and weight control  very strongly urged to quit smoking to reduce cardiovascular risk  cardiovascular risk and specific lipid/LDL goals reviewed  reviewed medications and side effects in detail  use of aspirin to prevent MI and TIA's discussed  radiology results and schedule of future radiology studies reviewed with patient      I have discussed the diagnosis with the patient and the intended plan of care as seen in the above orders. The patient has received an after-visit summary and questions were answered concerning future plans. I have discussed medication, side effects, and warnings with the patient in detail. The patient verbalized understanding and is in agreement with the plan of care. The patient will contact the office with any additional concerns. Amanda Aldrich MD    PLEASE NOTE:   This document has been produced using voice recognition software.  Unrecognized errors in transcription may be present

## 2020-11-03 ENCOUNTER — OFFICE VISIT (OUTPATIENT)
Dept: FAMILY MEDICINE CLINIC | Age: 70
End: 2020-11-03
Payer: COMMERCIAL

## 2020-11-03 VITALS
BODY MASS INDEX: 19.63 KG/M2 | SYSTOLIC BLOOD PRESSURE: 119 MMHG | HEIGHT: 61 IN | HEART RATE: 72 BPM | RESPIRATION RATE: 16 BRPM | WEIGHT: 104 LBS | OXYGEN SATURATION: 98 % | DIASTOLIC BLOOD PRESSURE: 80 MMHG | TEMPERATURE: 98 F

## 2020-11-03 DIAGNOSIS — R05.9 COUGH: ICD-10-CM

## 2020-11-03 DIAGNOSIS — M62.838 MUSCLE SPASM: ICD-10-CM

## 2020-11-03 DIAGNOSIS — M25.551 RIGHT HIP PAIN: ICD-10-CM

## 2020-11-03 DIAGNOSIS — G62.9 NEUROPATHY: Primary | ICD-10-CM

## 2020-11-03 DIAGNOSIS — E78.5 DYSLIPIDEMIA: ICD-10-CM

## 2020-11-03 DIAGNOSIS — I10 ESSENTIAL HYPERTENSION: ICD-10-CM

## 2020-11-03 DIAGNOSIS — Z72.0 TOBACCO ABUSE DISORDER: ICD-10-CM

## 2020-11-03 PROCEDURE — 99214 OFFICE O/P EST MOD 30 MIN: CPT | Performed by: FAMILY MEDICINE

## 2020-11-03 RX ORDER — CYCLOBENZAPRINE HCL 10 MG
10 TABLET ORAL
Qty: 60 TAB | Refills: 3 | Status: SHIPPED | OUTPATIENT
Start: 2020-11-03

## 2020-11-03 RX ORDER — ATORVASTATIN CALCIUM 80 MG/1
80 TABLET, FILM COATED ORAL DAILY
Qty: 90 TAB | Refills: 1 | Status: SHIPPED | OUTPATIENT
Start: 2020-11-03 | End: 2021-06-01

## 2020-11-03 RX ORDER — PREGABALIN 50 MG/1
50 CAPSULE ORAL 2 TIMES DAILY
Qty: 60 CAP | Refills: 2 | Status: SHIPPED | OUTPATIENT
Start: 2020-11-03 | End: 2021-08-12

## 2020-11-03 RX ORDER — ALBUTEROL SULFATE 90 UG/1
2 AEROSOL, METERED RESPIRATORY (INHALATION)
Qty: 1 INHALER | Refills: 2 | Status: SHIPPED | OUTPATIENT
Start: 2020-11-03 | End: 2021-09-29

## 2020-11-03 RX ORDER — GUAIFENESIN/DEXTROMETHORPHAN 100-10MG/5
10 SYRUP ORAL
Qty: 236 ML | Refills: 0 | Status: SHIPPED | OUTPATIENT
Start: 2020-11-03 | End: 2020-11-13

## 2020-11-03 RX ORDER — MELOXICAM 7.5 MG/1
7.5 TABLET ORAL
Qty: 90 TAB | Refills: 0 | Status: SHIPPED | OUTPATIENT
Start: 2020-11-03 | End: 2021-06-01

## 2020-11-03 RX ORDER — AMLODIPINE BESYLATE 10 MG/1
TABLET ORAL
Qty: 90 TAB | Refills: 1 | Status: SHIPPED | OUTPATIENT
Start: 2020-11-03 | End: 2021-06-01

## 2020-11-03 RX ORDER — VALSARTAN 160 MG/1
160 TABLET ORAL DAILY
Qty: 90 TAB | Refills: 1 | Status: SHIPPED | OUTPATIENT
Start: 2020-11-03 | End: 2021-06-01

## 2020-11-03 NOTE — PROGRESS NOTES
Chief Complaint   Patient presents with    Follow Up Chronic Condition    Medication Refill     1. Have you been to the ER, urgent care clinic since your last visit? Hospitalized since your last visit? No    2. Have you seen or consulted any other health care providers outside of the [address] [place] [address] since your last visit? Include any pap smears or colon screening. No     [facility]  [facility] for follow-up care. Hypertension: Patient has hypertension. She denies headache, changes in vision or focal weakness. Blood pressure has been stable. She is on valsartan and amlodipine. We will continue with these medications. Recheck labs at next visit. Neuropathy: Patient has neuropathy. She gets numbness and tingling of the hands and the feet. She is on pregabalin. Stable on the medication. Would like a refill of the same. Prescription is sent into the pharmacy. Hip pain: Patient has right hip pain. She denies trauma, swelling or redness of the hip. She takes Mobic daily for this. Would like a refill of medication. This is due to osteoarthritis. Mobic is sent into the pharmacy. If symptoms worsen she will need to see the orthopedist.  Dyslipidemia: Patient is on atorvastatin 80 mg daily. She tolerates the medication. I will recheck lipid panel at next visit. Muscle spasm: Patient has lower back muscle spasm and also muscle spasms around her right hip and thigh. She has used Flexeril with good result. Would like a refill of medication. Prescription is sent in. Cough: Patient has an occasional cough that comes on and off. She also has wheeze. She has used albuterol inhaler for this with good result. She would like some cough medication. I will send in Robitussin-DM. She denies hemoptysis, chest pain, fever or chills. Tobacco abuse disorder: Patient is to smoke. Has been advised to quit the habit. She is not ready yet.     Past Medical History  Past Medical History:   Diagnosis Date    Arthritis     Blind left eye     [address] [facility] [address] [address]     Glaucoma     High cholesterol     Hip pain     Hypertension        Surgical History  Past Surgical History:   Procedure Laterality Date    COLONOSCOPY N/A 5/9/2018    COLONOSCOPY / polypectomy performed by Richard Roper MD at [facility] ENDOSCOPY    HX COLONOSCOPY  2008    hx of polyps    HX MASTECTOMY Left     VASCULAR SURGERY PROCEDURE UNLIST Bilateral     vein cleaning to help with nerves        Medications  Current Outpatient Medications   Medication Sig Dispense Refill    pregabalin (LYRICA) 50 mg capsule Take 1 Cap by mouth two (2) times a day. Max Daily Amount: 100 mg. 60 Cap 2    meloxicam (MOBIC) 7.5 mg tablet Take 1 Tab by mouth daily as needed for Pain. 90 Tab 0    cyclobenzaprine (FLEXERIL) 10 mg tablet Take 1 Tab by mouth three (3) times daily as needed for Muscle Spasm(s). 60 Tab 3    amLODIPine (NORVASC) 10 mg tablet TAKE 1 TABLET BY MOUTH DAILY 90 Tab 1    valsartan (DIOVAN) 160 mg tablet Take 1 Tab by mouth daily. 90 Tab 1    atorvastatin (LIPITOR) 80 mg tablet Take 1 Tab by mouth daily. 90 Tab 1    albuterol (Ventolin HFA) 90 mcg/actuation inhaler Take 2 Puffs by inhalation every six (6) hours as needed for Wheezing. 1 Inhaler 2    guaiFENesin-dextromethorphan (ROBITUSSIN DM) 100-10 mg/5 mL syrup Take 10 mL by mouth three (3) times daily as needed for Cough or Congestion for up to 10 days. 236 mL 0    fluticasone propionate (FLONASE) 50 mcg/actuation nasal spray SHAKE LIQUID AND USE 2 SPRAYS IN EACH NOSTRIL DAILY 48 g 1    aspirin delayed-release 81 mg tablet Take  by mouth daily.       COMBIGAN 0.2-0.5 % drop ophthalmic solution INSTILL 1 DROP DAILY IN RIGHT EYE  3       Allergies  No Known Allergies    Family History  Family History   Problem Relation Age of Onset    Diabetes Mother     Diabetes Father     Colon Polyps Sister        Social History  Social History     Socioeconomic History    Marital status:      Spouse name: Not on file    Number of children: Not on file    Years of education: Not on file    Highest education level: Not on file   Occupational History    Occupation: Retired   Social Needs    Financial resource strain: Not on file    Food insecurity     Worry: Not on file     Inability: Not on file   [employer] needs     Medical: Not on file     Non-medical: Not on file   Tobacco Use    Smoking status: Light Tobacco Smoker     Types: Cigarettes    Smokeless tobacco: Never Used   Substance and Sexual Activity    Alcohol use: Yes     Alcohol/week: 1.0 standard drinks     Types: 1 Cans of beer per week     Comment: occ    Drug use: Not Currently     Types: Prescription    Sexual activity: Never   Lifestyle    Physical activity     Days per week: Not on file     Minutes per session: Not on file    Stress: Not on file   Relationships    Social connections     Talks on phone: Not on file     Gets together: Not on file     Attends Mandaen service: Not on file     Active member of club or organization: Not on file     Attends meetings of clubs or organizations: Not on file     Relationship status: Not on file    Intimate partner violence     Fear of current or ex partner: Not on file     Emotionally abused: Not on file     Physically abused: Not on file     Forced sexual activity: Not on file   Other Topics Concern     Service No    Blood Transfusions No    Caffeine Concern No    Occupational Exposure No    Hobby Hazards No    Sleep Concern No    Stress Concern No    Weight Concern No    Special Diet No    Back Care No    Exercise No    Bike Helmet No    Seat Belt Yes    Self-Exams Yes   Social History Narrative    Not on file       Review of Systems  Review of Systems - Review of all systems is negative except as noted above in the HPI.     Vital Signs  Visit Vitals  /80 (BP 1 Location: Right arm, BP Patient Position: Sitting)   Pulse 72   Temp 98 °F (36.7 °C) (Oral)   Resp 16   Ht 5' 1\" (1.549 m)   Wt 104 lb (47.2 kg)   SpO2 98%   BMI 19.65 kg/m²         Physical Exam  Physical Examination: General appearance - oriented to person, place, and time and acyanotic, in no respiratory distress  Mental status - alert, oriented to person, place, and time, affect appropriate to mood  Neck - supple, no significant adenopathy  Lymphatics - no palpable lymphadenopathy  Chest - decreased air entry noted bilateral lung bases  Heart - S1 and S2 normal  Abdomen - no rebound tenderness noted  Back exam - limited range of motion  Neurological - abnormal neurological exam unchanged from prior examinations  Musculoskeletal - osteoarthritic changes noted in both hands  Extremities - no pedal edema noted, intact peripheral pulses        Results  Results for orders placed or performed during the hospital encounter of 02/10/20   LIPID PANEL   Result Value Ref Range    LIPID PROFILE          Cholesterol, total 221 (H) <200 MG/DL    Triglyceride 120 <150 MG/DL    HDL Cholesterol 83 (H) 40 - 60 MG/DL    LDL, calculated 114 (H) 0 - 100 MG/DL    VLDL, calculated 24 MG/DL    CHOL/HDL Ratio 2.7 0 - 5.0     CBC WITH AUTOMATED DIFF   Result Value Ref Range    WBC 8.5 4.6 - 13.2 K/uL    RBC 4.08 (L) 4.20 - 5.30 M/uL    HGB 12.0 12.0 - 16.0 g/dL    HCT 35.8 35.0 - 45.0 %    MCV 87.7 74.0 - 97.0 FL    MCH 29.4 24.0 - 34.0 PG    MCHC 33.5 31.0 - 37.0 g/dL    RDW 13.4 11.6 - 14.5 %    PLATELET 944 (H) 223 - 420 K/uL    MPV 10.2 9.2 - 11.8 FL    NEUTROPHILS 50 40 - 73 %    LYMPHOCYTES 40 21 - 52 %    MONOCYTES 8 3 - 10 %    EOSINOPHILS 2 0 - 5 %    BASOPHILS 0 0 - 2 %    ABS. NEUTROPHILS 4.2 1.8 - 8.0 K/UL    ABS. LYMPHOCYTES 3.5 0.9 - 3.6 K/UL    ABS. MONOCYTES 0.7 0.05 - 1.2 K/UL    ABS. EOSINOPHILS 0.1 0.0 - 0.4 K/UL    ABS.  BASOPHILS 0.0 0.0 - 0.1 K/UL    DF AUTOMATED     METABOLIC PANEL, COMPREHENSIVE   Result Value Ref Range    Sodium 141 136 - 145 mmol/L    Potassium 4.1 3.5 - 5.5 mmol/L Chloride 106 100 - 111 mmol/L    CO2 29 21 - 32 mmol/L    Anion gap 6 3.0 - 18 mmol/L    Glucose 103 (H) 74 - 99 mg/dL    BUN 22 (H) 7.0 - 18 MG/DL    Creatinine 0.70 0.6 - 1.3 MG/DL    BUN/Creatinine ratio 31 (H) 12 - 20      GFR est AA >60 >60 ml/min/1.73m2    GFR est non-AA >60 >60 ml/min/1.73m2    Calcium 10.3 (H) 8.5 - 10.1 MG/DL    Bilirubin, total 1.4 (H) 0.2 - 1.0 MG/DL    ALT (SGPT) 28 13 - 56 U/L    AST (SGOT) 23 10 - 38 U/L    Alk. phosphatase 72 45 - 117 U/L    Protein, total 7.5 6.4 - 8.2 g/dL    Albumin 4.3 3.4 - 5.0 g/dL    Globulin 3.2 2.0 - 4.0 g/dL    A-G Ratio 1.3 0.8 - 1.7         ASSESSMENT and PLAN    ICD-10-CM ICD-9-CM    1. Neuropathy  G62.9 355.9 pregabalin (LYRICA) 50 mg capsule   2. Right hip pain  M25.551 719.45 meloxicam (MOBIC) 7.5 mg tablet   3. Essential hypertension  I10 401.9 cyclobenzaprine (FLEXERIL) 10 mg tablet      amLODIPine (NORVASC) 10 mg tablet      valsartan (DIOVAN) 160 mg tablet   4. Dyslipidemia  E78.5 272.4 atorvastatin (LIPITOR) 80 mg tablet   5. Cough  R05 786.2 albuterol (Ventolin HFA) 90 mcg/actuation inhaler      guaiFENesin-dextromethorphan (ROBITUSSIN DM) 100-10 mg/5 mL syrup   6. Muscle spasm  M62.838 728.85    7. Tobacco abuse disorder  Z72.0 305.1      lab results and schedule of future lab studies reviewed with patient  reviewed diet, exercise and weight control  cardiovascular risk and specific lipid/LDL goals reviewed  reviewed medications and side effects in detail      I have discussed the diagnosis with the patient and the intended plan of care as seen in the above orders. The patient has received an after-visit summary and questions were answered concerning future plans. I have discussed medication, side effects, and warnings with the patient in detail. The patient verbalized understanding and is in agreement with the plan of care. The patient will contact the office with any additional concerns.     Enzo Garrison MD    PLEASE NOTE:   This document has been produced using voice recognition software.  Unrecognized errors in transcription may be present

## 2021-01-12 ENCOUNTER — TELEPHONE (OUTPATIENT)
Dept: FAMILY MEDICINE CLINIC | Age: 71
End: 2021-01-12

## 2021-01-12 NOTE — TELEPHONE ENCOUNTER
Pt stated she tested positive for COVID 19 and is requesting Dr Kelly Celis to send in a prescription for some antibiotics.  Please follow up when available

## 2021-01-12 NOTE — TELEPHONE ENCOUNTER
Spoke with patient at this time. Patient states her results came back today and she has a cough no fever, no SOB at this time. Patient is requesting ABT to help with coughing and congestion

## 2021-01-13 DIAGNOSIS — J40 BRONCHITIS: Primary | ICD-10-CM

## 2021-01-13 RX ORDER — AZITHROMYCIN 250 MG/1
TABLET, FILM COATED ORAL
Qty: 6 TAB | Refills: 0 | Status: SHIPPED | OUTPATIENT
Start: 2021-01-13 | End: 2021-01-18

## 2021-04-20 ENCOUNTER — OFFICE VISIT (OUTPATIENT)
Dept: FAMILY MEDICINE CLINIC | Age: 71
End: 2021-04-20
Payer: COMMERCIAL

## 2021-04-20 VITALS
BODY MASS INDEX: 20.58 KG/M2 | RESPIRATION RATE: 18 BRPM | WEIGHT: 109 LBS | SYSTOLIC BLOOD PRESSURE: 134 MMHG | HEIGHT: 61 IN | DIASTOLIC BLOOD PRESSURE: 70 MMHG | TEMPERATURE: 98.2 F | OXYGEN SATURATION: 100 % | HEART RATE: 71 BPM

## 2021-04-20 DIAGNOSIS — T78.40XD ALLERGY, SUBSEQUENT ENCOUNTER: ICD-10-CM

## 2021-04-20 DIAGNOSIS — Z13.31 SCREENING FOR DEPRESSION: ICD-10-CM

## 2021-04-20 DIAGNOSIS — F39 MOOD DISORDER (HCC): ICD-10-CM

## 2021-04-20 DIAGNOSIS — G89.29 CHRONIC MIDLINE LOW BACK PAIN, UNSPECIFIED WHETHER SCIATICA PRESENT: ICD-10-CM

## 2021-04-20 DIAGNOSIS — I73.9 PERIPHERAL VASCULAR DISEASE (HCC): ICD-10-CM

## 2021-04-20 DIAGNOSIS — E78.5 DYSLIPIDEMIA: ICD-10-CM

## 2021-04-20 DIAGNOSIS — G62.9 NEUROPATHY: ICD-10-CM

## 2021-04-20 DIAGNOSIS — M54.50 CHRONIC MIDLINE LOW BACK PAIN, UNSPECIFIED WHETHER SCIATICA PRESENT: ICD-10-CM

## 2021-04-20 DIAGNOSIS — Z71.89 ADVANCED DIRECTIVES, COUNSELING/DISCUSSION: ICD-10-CM

## 2021-04-20 DIAGNOSIS — I10 ESSENTIAL HYPERTENSION: ICD-10-CM

## 2021-04-20 DIAGNOSIS — Z00.00 MEDICARE ANNUAL WELLNESS VISIT, SUBSEQUENT: ICD-10-CM

## 2021-04-20 DIAGNOSIS — L98.9 SKIN LESION: Primary | ICD-10-CM

## 2021-04-20 PROCEDURE — G0439 PPPS, SUBSEQ VISIT: HCPCS | Performed by: FAMILY MEDICINE

## 2021-04-20 PROCEDURE — 99214 OFFICE O/P EST MOD 30 MIN: CPT | Performed by: FAMILY MEDICINE

## 2021-04-20 RX ORDER — LORATADINE 10 MG/1
10 TABLET ORAL
Qty: 90 TAB | Refills: 0 | Status: SHIPPED | OUTPATIENT
Start: 2021-04-20 | End: 2021-07-20

## 2021-04-20 RX ORDER — ZOSTER VACCINE RECOMBINANT, ADJUVANTED 50 MCG/0.5
0.5 KIT INTRAMUSCULAR ONCE
Qty: 0.5 ML | Refills: 0 | Status: SHIPPED | OUTPATIENT
Start: 2021-04-20 | End: 2021-04-20

## 2021-04-20 NOTE — PATIENT INSTRUCTIONS
Medicare Wellness Visit, Female The best way to live healthy is to have a lifestyle where you eat a well-balanced diet, exercise regularly, limit alcohol use, and quit all forms of tobacco/nicotine, if applicable. Regular preventive services are another way to keep healthy. Preventive services (vaccines, screening tests, monitoring & exams) can help personalize your care plan, which helps you manage your own care. Screening tests can find health problems at the earliest stages, when they are easiest to treat. [facility] follows the current, evidence-based guidelines published by the [facility] ([facility]) when recommending preventive services for our patients. Because we follow these guidelines, sometimes recommendations change over time as research supports it. (For example, mammograms used to be recommended annually. Even though Medicare will still pay for an annual mammogram, the newer guidelines recommend a mammogram every two years for women of average risk). Of course, you and your doctor may decide to screen more often for some diseases, based on your risk and your co-morbidities (chronic disease you are already diagnosed with). Preventive services for you include: - Medicare offers their members a free annual wellness visit, which is time for you and your primary care provider to discuss and plan for your preventive service needs. Take advantage of this benefit every year! 
-All adults over the age of 72 should receive the recommended pneumonia vaccines. Current USPSTF guidelines recommend a series of two vaccines for the best pneumonia protection.  
-All adults should have a flu vaccine yearly and a tetanus vaccine every 10 years.  
-All adults age 48 and older should receive the shingles vaccines (series of two vaccines).      
-All adults age 38-68 who are overweight should have a diabetes screening test once every three years.  
-All adults born between [birth year] and 1966 should be screened once for Hepatitis C. 
-Other screening tests and preventive services for persons with diabetes include: an eye exam to screen for diabetic retinopathy, a kidney function test, a foot exam, and stricter control over your cholesterol.  
-Cardiovascular screening for adults with routine risk involves an electrocardiogram (ECG) at intervals determined by your doctor.  
-Colorectal cancer screenings should be done for adults age 54-65 with no increased risk factors for colorectal cancer. There are a number of acceptable methods of screening for this type of cancer. Each test has its own benefits and drawbacks. Discuss with your doctor what is most appropriate for you during your annual wellness visit. The different tests include: colonoscopy (considered the best screening method), a fecal occult blood test, a fecal DNA test, and sigmoidoscopy. 
 
-A bone mass density test is recommended when a woman turns 65 to screen for osteoporosis. This test is only recommended one time, as a screening. Some providers will use this same test as a disease monitoring tool if you already have osteoporosis. -Breast cancer screenings are recommended every other year for women of normal risk, age 54-69. 
-Cervical cancer screenings for women over age 72 are only recommended with certain risk factors. Here is a list of your current Health Maintenance items (your personalized list of preventive services) with a due date: 
Health Maintenance Due Topic Date Due  
 DTaP/Tdap/Td  (1 - Tdap) Never done  Shingles Vaccine (1 of 2) Never done [facility] Annual Well Visit  02/10/2021  Cholesterol Test   02/10/2021  Mammogram  02/13/2021 Medicare Wellness Visit, Female The best way to live healthy is to have a lifestyle where you eat a well-balanced diet, exercise regularly, limit alcohol use, and quit all forms of tobacco/nicotine, if applicable.  
  
Regular preventive services are another way to keep healthy. Preventive services (vaccines, screening tests, monitoring & exams) can help personalize your care plan, which helps you manage your own care. Screening tests can find health problems at the earliest stages, when they are easiest to treat. [facility] follows the current, evidence-based guidelines published by the [facility] ([facility]) when recommending preventive services for our patients. Because we follow these guidelines, sometimes recommendations change over time as research supports it. (For example, mammograms used to be recommended annually. Even though Medicare will still pay for an annual mammogram, the newer guidelines recommend a mammogram every two years for women of average risk). Of course, you and your doctor may decide to screen more often for some diseases, based on your risk and your co-morbidities (chronic disease you are already diagnosed with). Preventive services for you include: - Medicare offers their members a free annual wellness visit, which is time for you and your primary care provider to discuss and plan for your preventive service needs. Take advantage of this benefit every year! 
-All adults over the age of 72 should receive the recommended pneumonia vaccines. Current USPSTF guidelines recommend a series of two vaccines for the best pneumonia protection.  
-All adults should have a flu vaccine yearly and a tetanus vaccine every 10 years.  
-All adults age 48 and older should receive the shingles vaccines (series of two vaccines).      
-All adults age 38-68 who are overweight should have a diabetes screening test once every three years.  
-All adults born between [birth year] and 1966 should be screened once for Hepatitis C. 
-Other screening tests and preventive services for persons with diabetes include: an eye exam to screen for diabetic retinopathy, a kidney function test, a foot exam, and stricter control over your cholesterol.  
-Cardiovascular screening for adults with routine risk involves an electrocardiogram (ECG) at intervals determined by your doctor.  
-Colorectal cancer screenings should be done for adults age 54-65 with no increased risk factors for colorectal cancer. There are a number of acceptable methods of screening for this type of cancer. Each test has its own benefits and drawbacks. Discuss with your doctor what is most appropriate for you during your annual wellness visit. The different tests include: colonoscopy (considered the best screening method), a fecal occult blood test, a fecal DNA test, and sigmoidoscopy. 
 
-A bone mass density test is recommended when a woman turns 65 to screen for osteoporosis. This test is only recommended one time, as a screening. Some providers will use this same test as a disease monitoring tool if you already have osteoporosis. -Breast cancer screenings are recommended every other year for women of normal risk, age 54-69. 
-Cervical cancer screenings for women over age 72 are only recommended with certain risk factors. Here is a list of your current Health Maintenance items (your personalized list of preventive services) with a due date: 
Health Maintenance Due Topic Date Due  
 DTaP/Tdap/Td  (1 - Tdap) Never done  Shingles Vaccine (1 of 2) Never done  Cholesterol Test   02/10/2021  Mammogram  02/13/2021

## 2021-04-20 NOTE — PROGRESS NOTES
YESENIA  Keke Toth comes in for f/u care. Skin lesion: patient has lesion right inner thigh that is getting bigger, darkening and painful. She was seen in the emergency room and they put on doxycycline. She comes in for follow-up care. This is a lesion that needs excision and pathological evaluation. Will refer to the dermatologist.  HTN: Patient has hypertension. Blood pressure is stable. She denies headache, changes in vision or focal weakness. She is on amlodipine and valsartan. We will continue with the current treatment plan. PVD: Clinical history of peripheral vascular disease. This affects her left lower extremity. Has had atherectomy done in the past.  She was seen by Dr Darius Gaytan. Has not had follow-up care. She is states that she will call to set up an appointment for follow-up care. Declines to have referral placed at the moment. She denies discoloration/cyanosis of the lower extremities. Dyslipidemia: Patient has dyslipidemia. She is on atorvastatin. We will recheck lipid panel. Back pain: Patient has chronic low back pain. She takes Flexeril and Mobic for this. Also uses pregabalin. Has been stable on these medications. Neuropathy: Patient has neuropathy. Gets numbness and tingling of the lower extremities. she has been on gabapentin She has a history of chronic back pain that results in the neuropathy. She has been on Lyrica. This has helped with the symptoms. She will complete a controlled substance agreement today. Allergy: Patient has allergies with nasal congestion, sneezing. Denies fever or chills. She would like some medication for this. She has used Flonase. I will give Claritin to take daily. HM: I will give patient shingles prescription. She has had a colonoscopy and is up-to-date with this. We will check labs.   She is scheduled for mammogram.      Past Medical History  Past Medical History:   Diagnosis Date    Arthritis     Blind left eye     [facility]     Glaucoma     High cholesterol     Hip pain     Hypertension        Surgical History  Past Surgical History:   Procedure Laterality Date    COLONOSCOPY N/A 5/9/2018    COLONOSCOPY / polypectomy performed by Ahsan Davison [facility] at [facility]    HX COLONOSCOPY  2008    hx of polyps    HX MASTECTOMY Left     VASCULAR SURGERY PROCEDURE UNLIST Bilateral     vein cleaning to help with nerves        Medications  Current Outpatient Medications   Medication Sig Dispense Refill    pregabalin (LYRICA) 50 mg capsule Take 1 Cap by mouth two (2) times a day. Max Daily Amount: 100 mg. 60 Cap 2    meloxicam (MOBIC) 7.5 mg tablet Take 1 Tab by mouth daily as needed for Pain. 90 Tab 0    cyclobenzaprine (FLEXERIL) 10 mg tablet Take 1 Tab by mouth three (3) times daily as needed for Muscle Spasm(s). 60 Tab 3    amLODIPine (NORVASC) 10 mg tablet TAKE 1 TABLET BY MOUTH DAILY 90 Tab 1    valsartan (DIOVAN) 160 mg tablet Take 1 Tab by mouth daily. 90 Tab 1    atorvastatin (LIPITOR) 80 mg tablet Take 1 Tab by mouth daily. 90 Tab 1    albuterol (Ventolin HFA) 90 mcg/actuation inhaler Take 2 Puffs by inhalation every six (6) hours as needed for Wheezing. 1 Inhaler 2    fluticasone propionate (FLONASE) 50 mcg/actuation nasal spray SHAKE LIQUID AND USE 2 SPRAYS IN EACH NOSTRIL DAILY 48 g 1    aspirin delayed-release 81 mg tablet Take  by mouth daily.       COMBIGAN 0.2-0.5 % drop ophthalmic solution INSTILL 1 DROP DAILY IN RIGHT EYE  3       Allergies  No Known Allergies    Family History  Family History   Problem Relation Age of Onset    Diabetes Mother     Diabetes Father     Colon Polyps Sister        Social History  Social History     Socioeconomic History    Marital status:      Spouse name: Not on file    Number of children: Not on file    Years of education: Not on file    Highest education level: Not on file   Occupational History    Occupation: Retired   Social Needs    Financial resource strain: Not on file    Food insecurity     Worry: Not on file     Inability: Not on file    Transportation needs     Medical: Not on file     Non-medical: Not on file   Tobacco Use    Smoking status: Light Tobacco Smoker     Types: Cigarettes    Smokeless tobacco: Never Used   Substance and Sexual Activity    Alcohol use: Yes     Alcohol/week: 1.0 standard drinks     Types: 1 Cans of beer per week     Frequency: Monthly or less     Drinks per session: 1 or 2     Binge frequency: Monthly     Comment: occ    Drug use: Not Currently     Types: Prescription    Sexual activity: Never   Lifestyle    Physical activity     Days per week: Not on file     Minutes per session: Not on file    Stress: Not on file   Relationships    Social connections     Talks on phone: Not on file     Gets together: Not on file     Attends Taoist service: Not on file     Active member of club or organization: Not on file     Attends meetings of clubs or organizations: Not on file     Relationship status: Not on file    Intimate partner violence     Fear of current or ex partner: Not on file     Emotionally abused: Not on file     Physically abused: Not on file     Forced sexual activity: Not on file   Other Topics Concern     Service No    Blood Transfusions No    Caffeine Concern No    Occupational Exposure No    Hobby Hazards No    Sleep Concern No    Stress Concern No    Weight Concern No    Special Diet No    Back Care No    Exercise No    Bike Helmet No    Seat Belt Yes    Self-Exams Yes   Social History Narrative    Not on file       Review of Systems  Review of Systems - Review of all systems is negative except as noted above in the HPI.     Vital Signs  Visit Vitals  /70 (BP 1 Location: Right arm, BP Patient Position: Sitting, BP Cuff Size: Adult)   Pulse 71   Temp 98.2 °F (36.8 °C) (Oral)   Resp 18   Ht 5' 1\" (1.549 m)   Wt 109 lb (49.4 kg)   SpO2 100%   BMI 20.60 kg/m²         Physical Exam  Physical Examination: General appearance - oriented to person, place, and time and acyanotic, in no respiratory distress  Mental status - affect appropriate to mood  Mouth - mucous membranes moist, pharynx normal without lesions  Neck - supple, no significant adenopathy  Lymphatics - no palpable lymphadenopathy  Chest - no tachypnea, retractions or cyanosis  Heart - S1 and S2 normal  Abdomen - no rebound tenderness noted  Back exam - limited range of motion  Neurological - abnormal neurological exam unchanged from prior examinations  Musculoskeletal - osteoarthritic changes noted in both hands  Extremities - intact peripheral pulses  Skin -darkened lesion with raised edges right posterior thigh that is painful to the touch and not erythematous and not draining. Results  Results for orders placed or performed during the hospital encounter of 02/10/20   LIPID PANEL   Result Value Ref Range    LIPID PROFILE          Cholesterol, total 221 (H) <200 MG/DL    Triglyceride 120 <150 MG/DL    HDL Cholesterol 83 (H) 40 - 60 MG/DL    LDL, calculated 114 (H) 0 - 100 MG/DL    VLDL, calculated 24 MG/DL    CHOL/HDL Ratio 2.7 0 - 5.0     CBC WITH AUTOMATED DIFF   Result Value Ref Range    WBC 8.5 4.6 - 13.2 K/uL    RBC 4.08 (L) 4.20 - 5.30 M/uL    HGB 12.0 12.0 - 16.0 g/dL    HCT 35.8 35.0 - 45.0 %    MCV 87.7 74.0 - 97.0 FL    MCH 29.4 24.0 - 34.0 PG    MCHC 33.5 31.0 - 37.0 g/dL    RDW 13.4 11.6 - 14.5 %    PLATELET 592 (H) 537 - 420 K/uL    MPV 10.2 9.2 - 11.8 FL    NEUTROPHILS 50 40 - 73 %    LYMPHOCYTES 40 21 - 52 %    MONOCYTES 8 3 - 10 %    EOSINOPHILS 2 0 - 5 %    BASOPHILS 0 0 - 2 %    ABS. NEUTROPHILS 4.2 1.8 - 8.0 K/UL    ABS. LYMPHOCYTES 3.5 0.9 - 3.6 K/UL    ABS. MONOCYTES 0.7 0.05 - 1.2 K/UL    ABS. EOSINOPHILS 0.1 0.0 - 0.4 K/UL    ABS.  BASOPHILS 0.0 0.0 - 0.1 K/UL    DF AUTOMATED     METABOLIC PANEL, COMPREHENSIVE   Result Value Ref Range    Sodium 141 136 - 145 mmol/L    Potassium 4.1 3.5 - 5.5 mmol/L Chloride 106 100 - 111 mmol/L    CO2 29 21 - 32 mmol/L    Anion gap 6 3.0 - 18 mmol/L    Glucose 103 (H) 74 - 99 mg/dL    BUN 22 (H) 7.0 - 18 MG/DL    Creatinine 0.70 0.6 - 1.3 MG/DL    BUN/Creatinine ratio 31 (H) 12 - 20      GFR est AA >60 >60 ml/min/1.73m2    GFR est non-AA >60 >60 ml/min/1.73m2    Calcium 10.3 (H) 8.5 - 10.1 MG/DL    Bilirubin, total 1.4 (H) 0.2 - 1.0 MG/DL    ALT (SGPT) 28 13 - 56 U/L    AST (SGOT) 23 10 - 38 U/L    Alk. phosphatase 72 45 - 117 U/L    Protein, total 7.5 6.4 - 8.2 g/dL    Albumin 4.3 3.4 - 5.0 g/dL    Globulin 3.2 2.0 - 4.0 g/dL    A-G Ratio 1.3 0.8 - 1.7         ASSESSMENT and PLAN    ICD-10-CM ICD-9-CM    1. Skin lesion  L98.9 709.9 REFERRAL TO DERMATOLOGY   2. Mood disorder (HCC)  F39 296.90    3. Peripheral vascular disease (HCC)  I73.9 443.9    4. Essential hypertension  V61 590.0 METABOLIC PANEL, COMPREHENSIVE      CBC WITH AUTOMATED DIFF   5. Dyslipidemia  E78.5 272.4 LIPID PANEL   6. Allergy, subsequent encounter  T78.40XD V58.89 loratadine (Claritin) 10 mg tablet   7. Chronic midline low back pain, unspecified whether sciatica present  M54.5 724.2     G89.29 338.29    8. Neuropathy  G62.9 355.9    9. Medicare annual wellness visit, subsequent  Z00.00 V70.0    10. Advanced directives, counseling/discussion  Z71.89 V65.49 FULL CODE   11. Screening for depression  Z13.31 V79.0 [address]     lab results and schedule of future lab studies reviewed with patient  reviewed diet, exercise and weight control  cardiovascular risk and specific lipid/LDL goals reviewed  reviewed medications and side effects in detail  radiology results and schedule of future radiology studies reviewed with patient      I have discussed the diagnosis with the patient and the intended plan of care as seen in the above orders. The patient has received an after-visit summary and questions were answered concerning future plans.  I have discussed medication, side effects, and warnings with the patient in detail. The patient verbalized understanding and is in agreement with the plan of care. The patient will contact the office with any additional concerns. Robert Starks MD    PLEASE NOTE:   This document has been produced using voice recognition software.  Unrecognized errors in transcription may be present

## 2021-04-20 NOTE — PROGRESS NOTES
Chief Complaint   Patient presents with    Skin Problem     inner thigh    Annual Wellness Visit    Neck Pain     patient had a fall      1. Have you been to the ER, urgent care clinic since your last visit? Hospitalized since your last visit? No    2. Have you seen or consulted any other health care providers outside of the [address] [place] [address] since your last visit? Include any pap smears or colon screening. No  This is the Subsequent Medicare Annual Wellness Exam, performed 12 months or more after the Initial AWV or the last Subsequent AWV    I have reviewed the patient's medical history in detail and updated the computerized patient record. Assessment/Plan   Education and counseling provided:  Are appropriate based on today's review and evaluation    1. Medicare annual wellness visit, subsequent    2.  Advanced directives, counseling/discussion  - FULL CODE    3. Screening for depression  - DEPRESSION SCREEN ANNUAL  - [place] DEPRESSION SCREEN ANNUAL       Depression Risk Factor Screening     3 most recent PHQ Screens 4/20/2021   Little interest or pleasure in doing things Not at all   Feeling down, depressed, irritable, or hopeless Not at all   Total Score PHQ 2 0   Trouble falling or staying asleep, or sleeping too much Not at all   Feeling tired or having little energy Not at all   Poor appetite, weight loss, or overeating Not at all   Feeling bad about yourself - or that you are a failure or have let yourself or your family down Not at all   Trouble concentrating on things such as school, work, reading, or watching TV Not at all   Moving or speaking so slowly that other people could have noticed; or the opposite being so fidgety that others notice Not at all   Thoughts of being better off dead, or hurting yourself in some way Not at all   PHQ 9 Score 0   How difficult have these problems made it for you to do your work, take care of your home and get along with others Not difficult at all   8 minutes taken on depression screening. Alcohol Risk Screen    Do you average more than 1 drink per night or more than 7 drinks a week:  No    On any one occasion in the past three months have you have had more than 3 drinks containing alcohol:  No        Functional Ability and Level of Safety     1. Was the patient's timed Up and GO test unsteady or longer than 30 seconds? No  2. Does the patient need help with the phone, transportation, shopping, preparing meals, housework, laundry, medications or managing money? Yes  3. Does the patients' home have rugs in the hallway, lack grab bars in the bathroom, lack handrails on the stairs or have poor lighting? No  4. Have you noticed any hearing difficulties? No  Hearing Evaluation:    Hearing: Hearing is good. Activities of Daily Living: The home contains: no safety equipment. Patient does total self care      Ambulation: with difficulty, uses a cane     Fall Risk:  Fall Risk Assessment, last 12 mths 11/3/2020   Able to walk? Yes   Fall in past 12 months?  No      Abuse Screen:  Patient is not abused       Cognitive Screening    Has your family/caregiver stated any concerns about your memory: no     Cognitive Screening: Normal - MMSE (Mini Mental Status Exam)    Health Maintenance Due     Health Maintenance Due   Topic Date Due    DTaP/Tdap/Td series (1 - Tdap) Never done    Shingrix Vaccine Age 50> (1 of 2) Never done    Medicare Yearly Exam  02/10/2021    Lipid Screen  02/10/2021    Breast Cancer Screen Mammogram  02/13/2021       Patient Care Team   Patient Care Team:  Catalina Kennedy MD as PCP - General (Family Medicine)  Catalina Kennedy MD as PCP - [facility] Empaneled Provider  Andres Polanco MD (Ophthalmology)  Suzanne Hopper MD (Surgery)  Louie Salter MD (Hematology and Oncology)  Chris Bermudez MD as Consulting Provider (Neurology)    History   Lonny Drew is seen for medicare wellness exam.    Patient Active Problem List   Diagnosis Code    History of breast cancer Z85.3    H/O left mastectomy Z90.12    Dyslipidemia E78.5    Essential hypertension I10    Pain of right hip joint M25.551    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    Blind left eye H54.40    Peripheral vascular disease (HCC) I73.9     Past Medical History:   Diagnosis Date    Arthritis     Blind left eye     Breast cancer ([facility] [address]     Glaucoma     High cholesterol     Hip pain     Hypertension       Past Surgical History:   Procedure Laterality Date    COLONOSCOPY N/A 5/9/2018    COLONOSCOPY / polypectomy performed by Elsy Mcdaniels MD at [facility] END[facility]COPY    HX COLONOSCOPY  2008    hx of polyps    HX MASTECTOMY Left     VASCULAR SURGERY PROCEDURE UNLIST Bilateral     vein cleaning to help with nerves     Current Outpatient Medications   Medication Sig Dispense Refill    pregabalin (LYRICA) 50 mg capsule Take 1 Cap by mouth two (2) times a day. Max Daily Amount: 100 mg. 60 Cap 2    meloxicam (MOBIC) 7.5 mg tablet Take 1 Tab by mouth daily as needed for Pain. 90 Tab 0    cyclobenzaprine (FLEXERIL) 10 mg tablet Take 1 Tab by mouth three (3) times daily as needed for Muscle Spasm(s). 60 Tab 3    amLODIPine (NORVASC) 10 mg tablet TAKE 1 TABLET BY MOUTH DAILY 90 Tab 1    valsartan (DIOVAN) 160 mg tablet Take 1 Tab by mouth daily. 90 Tab 1    atorvastatin (LIPITOR) 80 mg tablet Take 1 Tab by mouth daily. 90 Tab 1    albuterol (Ventolin HFA) 90 mcg/actuation inhaler Take 2 Puffs by inhalation every six (6) hours as needed for Wheezing. 1 Inhaler 2    fluticasone propionate (FLONASE) 50 mcg/actuation nasal spray SHAKE LIQUID AND USE 2 SPRAYS IN EACH NOSTRIL DAILY 48 g 1    aspirin delayed-release 81 mg tablet Take  by mouth daily.       COMBIGAN 0.2-0.5 % drop ophthalmic solution INSTILL 1 DROP DAILY IN RIGHT EYE  3     No Known Allergies    Family History   Problem Relation Age of Onset    Diabetes Mother     Diabetes Father     Colon Polyps Sister      Social History Tobacco Use    Smoking status: Light Tobacco Smoker     Types: Cigarettes    Smokeless tobacco: Never Used   Substance Use Topics    Alcohol use: Yes     Alcohol/week: 1.0 standard drinks     Types: 1 Cans of beer per week     Frequency: Monthly or less     Drinks per session: 1 or 2     Binge frequency: Monthly     Comment: occ     I have discussed the diagnosis with the patient and the intended plan of care as seen in the above orders. The patient has received an after-visit summary and questions were answered concerning future plans. I have discussed medication, side effects, and warnings with the patient in detail. The patient verbalized understanding and is in agreement with the plan of care. The patient will contact the office with any additional concerns. Personalized preventative plan of care was discussed, printed and given to patient.     Lemar Eisenmenger, MD

## 2021-04-20 NOTE — ACP (ADVANCE CARE PLANNING)
Advance Care Planning     Advance Care Planning (ACP) Physician/NP/PA Conversation      Date of Conversation: 4/20/2021  Conducted with: Patient with Decision Making Capacity    Healthcare Decision Maker:     Primary Decision Maker (Active): Felicia Ferreira - Daughter - [phone]  Click here to complete [address] including selection of the Healthcare Decision Maker Relationship (ie \"Primary\")  Today we documented Decision Maker(s). The patient will provide ACP documents. Care Preferences:    Hospitalization: \"If your health worsens and it becomes clear that your chance of recovery is unlikely, what would be your preference regarding hospitalization? \"  The patient would prefer hospitalization. Ventilation: \"If you were unable to breathe on your own and your chance of recovery was unlikely, what would be your preference about the use of a ventilator (breathing machine) if it was available to you? \"   The patient would desire the use of a ventilator. Resuscitation: \"In the event your heart stopped as a result of an underlying serious health condition, would you want attempts to be made to restart your heart, or would you prefer a natural death? \"   Yes, attempt to resuscitate.     Additional topics discussed: treatment goals, ventilation preferences, hospitalization preferences and resuscitation preferences    Conversation Outcomes / Follow-Up Plan:   ACP in process - completing/providing documents   Reviewed DNR/DNI and patient elects Full Code (Attempt Resuscitation)     Length of Voluntary ACP Conversation in minutes:  16 minutes    Blanche Escobar MD

## 2021-05-29 DIAGNOSIS — M25.551 RIGHT HIP PAIN: ICD-10-CM

## 2021-05-29 DIAGNOSIS — E78.5 DYSLIPIDEMIA: ICD-10-CM

## 2021-05-29 DIAGNOSIS — I10 ESSENTIAL HYPERTENSION: ICD-10-CM

## 2021-06-01 RX ORDER — MELOXICAM 7.5 MG/1
TABLET ORAL
Qty: 90 TABLET | Refills: 0 | Status: SHIPPED | OUTPATIENT
Start: 2021-06-01 | End: 2021-07-20

## 2021-06-01 RX ORDER — VALSARTAN 160 MG/1
TABLET ORAL
Qty: 90 TABLET | Refills: 1 | Status: SHIPPED | OUTPATIENT
Start: 2021-06-01 | End: 2022-01-02

## 2021-06-01 RX ORDER — ATORVASTATIN CALCIUM 80 MG/1
TABLET, FILM COATED ORAL
Qty: 90 TABLET | Refills: 1 | Status: SHIPPED | OUTPATIENT
Start: 2021-06-01 | End: 2022-01-02

## 2021-06-01 RX ORDER — AMLODIPINE BESYLATE 10 MG/1
TABLET ORAL
Qty: 90 TABLET | Refills: 1 | Status: SHIPPED | OUTPATIENT
Start: 2021-06-01 | End: 2022-07-01

## 2021-07-06 ENCOUNTER — TELEPHONE (OUTPATIENT)
Dept: PHARMACY | Age: 71
End: 2021-07-06

## 2021-07-06 NOTE — TELEPHONE ENCOUNTER
CLINICAL PHARMACY: ADHERENCE REVIEW  Identified care gap per Sheron; fills at [facility]: ACE/ARB and Statin adherence    Last Visit: 4/20/21    Patient identified as LIS = 2, therefore patient may be able to receive a 90-day supply for the same cost as a 30-day supply    Patient found in [address]. Currently not eligible for CMR. ASSESSMENT  ACE/ARB ADHERENCE    Per Insurance Records through 6/21/21 ([place] = n/a%; YTD [place] = filled only once):   Valsartan 160 mg tablets last filled on 2/12/21 for 90 day supply. Next refill due: 5/13/21  Per Outcomes MTM, same as above. Note refill sent to pharmacy 6/1/21. BP Readings from Last 3 Encounters:   04/20/21 134/70   11/03/20 119/80   09/01/20 134/71     CrCl cannot be calculated (Patient's most recent lab result is older than the maximum 180 days allowed. ). [facility]    Per Insurance Records through 6/21/21 ([address] [facility] [place] = n/a%; Y[place] = filled only once): Atorvastatin 80 mg tablets last filled on 2/12/21 for 90 day supply. Next refill due: 5/13/21  Per Outcomes MTM, same as above. Note refill sent to pharmacy 6/1/21.     Lab Results   Component Value Date/Time    Cholesterol, total 221 (H) 02/10/2020 04:36 PM    HDL Cholesterol 83 (H) 02/10/2020 04:36 PM    LDL, calculated 114 (H) 02/10/2020 04:36 PM    VLDL, calculated 24 02/10/2020 04:36 PM    Triglyceride 120 02/10/2020 04:36 PM    CHOL/HDL Ratio 2.7 02/10/2020 04:36 PM     ALT (SGPT)   Date Value Ref Range Status   02/10/2020 28 13 - 56 U/L Final     AST (SGOT)   Date Value Ref Range Status   02/10/2020 23 10 - 38 U/L Final     The 10-year ASCVD risk score (Cyndy Wei, et al., 2013) is: 29%    Values used to calculate the score:      Age: 69 years      Sex: Female      Is Non- : Yes      Diabetic: No      Tobacco smoker: Yes      Systolic Blood Pressure: 282 mmHg      Is BP treated: Yes      HDL Cholesterol: 83 MG/DL      Total Cholesterol: 221 MG/DL     PLAN  The following are interventions that have been identified:  - Patient overdue refilling valsartan and atorvastatin and active on home medication list    Reached patient to review. Reached patient to review. She states she does need her atorvastatin and valsartan. She states she thought the pharmacy would automatically refill those for her but did not. Patient requests these be filled at the pharmacy and put on automatic refill. Inquired if she needed any more medications refilled, she declines. [facility] called, medications refilled 90 day supplies, $0, added to automatic refill. No further outreach planned at this time.     Future Appointments   Date Time Provider Deidra Mayorga   2021  8:30 AM Nayeli Robertson MD [facility] AMB   2022  9:00 AM Nayeli Robertson MD [facility] AMB Rip Sevin, PharmD, [facility]  Direct: [phone]  Department, toll free: [phone], option [address] in place: No   Recommendation Provided To: Patient/Caregiver: 2 via Telephone and Pharmacy: 2   Intervention Detail: Adherence Monitorin   Gap Closed?: Yes   Total # of Interventions Recommended: 4   Total # of Interventions Accepted: 4   Intervention Accepted By: Patient/Caregiver: 2 and Pharmacy: 2   Time Spent (min): 15

## 2021-07-20 ENCOUNTER — OFFICE VISIT (OUTPATIENT)
Dept: FAMILY MEDICINE CLINIC | Age: 71
End: 2021-07-20
Payer: COMMERCIAL

## 2021-07-20 VITALS
TEMPERATURE: 98 F | BODY MASS INDEX: 21.52 KG/M2 | OXYGEN SATURATION: 99 % | DIASTOLIC BLOOD PRESSURE: 60 MMHG | SYSTOLIC BLOOD PRESSURE: 127 MMHG | HEART RATE: 63 BPM | HEIGHT: 61 IN | WEIGHT: 114 LBS | RESPIRATION RATE: 16 BRPM

## 2021-07-20 DIAGNOSIS — E78.5 DYSLIPIDEMIA: ICD-10-CM

## 2021-07-20 DIAGNOSIS — J40 BRONCHITIS: Primary | ICD-10-CM

## 2021-07-20 DIAGNOSIS — G62.9 NEUROPATHY: ICD-10-CM

## 2021-07-20 DIAGNOSIS — I10 ESSENTIAL HYPERTENSION: ICD-10-CM

## 2021-07-20 PROCEDURE — 99213 OFFICE O/P EST LOW 20 MIN: CPT | Performed by: FAMILY MEDICINE

## 2021-07-20 RX ORDER — AZITHROMYCIN 250 MG/1
TABLET, FILM COATED ORAL
Qty: 6 TABLET | Refills: 0 | Status: SHIPPED | OUTPATIENT
Start: 2021-07-20 | End: 2021-07-25

## 2021-07-20 RX ORDER — PROMETHAZINE HYDROCHLORIDE AND DEXTROMETHORPHAN HYDROBROMIDE 6.25; 15 MG/5ML; MG/5ML
5 SYRUP ORAL
Qty: 240 ML | Refills: 0 | Status: SHIPPED | OUTPATIENT
Start: 2021-07-20 | End: 2021-07-27

## 2021-07-20 NOTE — PROGRESS NOTES
Chief Complaint   Patient presents with    Follow Up Chronic Condition    Cold Symptoms     1. Have you been to the ER, urgent care clinic since your last visit? Hospitalized since your last visit? No    2. Have you seen or consulted any other health care providers outside of the [address] [place] [address] since your last visit? Include any pap smears or colon screening. No     HPI  Thresa Reach comes in for f/u care. Cough: Patient has a cough that has been ongoing for 1 week. Cough is productive of mucopurulent phlegm with pleuritic chest pain. Occasional SOB and wheeze. Taken OTC cough drops and used inhaler medication. The inhaler medication does help with her wheeze. She however continues to have the cough with phlegm. Denies fever or chills. We will order a chest x-ray. I will give azithromycin and some Promethazine DM to take. HTN: Patient has hypertension. Blood pressure is stable. She denies headache, changes in vision or focal weakness. She is on Diovan and amlodipine. We will recheck labs. Dyslipidemia: Patient has dyslipidemia. She takes Lipitor 80 mg daily. We will recheck labs. Continue current treatment plan. Neuropathy: Patient has a history of neuropathy. She is on Lyrica in the past.  Symptoms have improved.        Past Medical History  Past Medical History:   Diagnosis Date    Arthritis     Blind left eye     Breast cancer [facility] [address]     Glaucoma     High cholesterol     Hip pain     Hypertension        Surgical History  Past Surgical History:   Procedure Laterality Date    COLONOSCOPY N/A 5/9/2018    COLONOSCOPY / polypect[facility] performed by [facility]an Dixon [facility] at [facility] ENDOSCOPY    HX COLONOSCOPY  2008    hx of polyps    HX MASTECTOMY Left     VASCULAR SURGERY PROCEDURE UNLIST Bilateral     vein cleaning to help with nerves        Medications  Current Outpatient Medications   Medication Sig Dispense Refill    valsartan (DIOVAN) 160 mg tablet TAKE 1 TABLET BY MOUTH DAILY 90 Tablet 1    atorvastatin (LIPITOR) 80 mg tablet TAKE 1 TABLET BY MOUTH DAILY 90 Tablet 1    amLODIPine (NORVASC) 10 mg tablet TAKE 1 TABLET BY MOUTH DAILY 90 Tablet 1    loratadine (Claritin) 10 mg tablet Take 1 Tab by mouth daily as needed for Allergies. 90 Tab 0    pregabalin (LYRICA) 50 mg capsule Take 1 Cap by mouth two (2) times a day. Max Daily Amount: 100 mg. 60 Cap 2    cyclobenzaprine (FLEXERIL) 10 mg tablet Take 1 Tab by mouth three (3) times daily as needed for Muscle Spasm(s). 60 Tab 3    albuterol (Ventolin HFA) 90 mcg/actuation inhaler Take 2 Puffs by inhalation every six (6) hours as needed for Wheezing. 1 Inhaler 2    fluticasone propionate (FLONASE) 50 mcg/actuation nasal spray SHAKE LIQUID AND USE 2 SPRAYS IN EACH NOSTRIL DAILY 48 g 1    aspirin delayed-release 81 mg tablet Take  by mouth daily.  COMBIGAN 0.2-0.5 % drop ophthalmic solution INSTILL 1 DROP DAILY IN RIGHT EYE  3    meloxicam (MOBIC) 7.5 mg tablet TAKE 1 TABLET BY MOUTH DAILY AS NEEDED FOR PAIN (Patient not taking: Reported on 7/20/2021) 90 Tablet 0       Allergies  No Known Allergies    Family History  Family History   Problem Relation Age of Onset    Diabetes Mother     Diabetes Father     Colon Polyps Sister        Social History  Social History     Socioeconomic History    Marital status:      Spouse name: Not on file    Number of children: Not on file    Years of education: Not on file    Highest education level: Not on file   Occupational History    Occupation: Retired   Tobacco Use    Smoking status: Light Tobacco Smoker     Types: Cigarettes    Smokeless tobacco: Never Used   Vaping Use    Vaping Use: Never used   Substance and Sexual Activity    Alcohol use:  Yes     Alcohol/week: 1.0 standard drinks     Types: 1 Cans of beer per week     Comment: occ    Drug use: Not Currently     Types: Prescription    Sexual activity: Never   Other Topics Concern     Service No    Blood Transfusions No    Caffeine Concern No    Occupational Exposure No    Hobby Hazards No    Sleep Concern No    Stress Concern No    Weight Concern No    Special Diet No    Back Care No    Exercise No    Bike Helmet No    Seat Belt Yes    Self-Exams Yes   Social History Narrative    Not on file     Social Determinants of Health     Financial Resource Strain:     Difficulty of Paying Living Expenses:    Food Insecurity:     Worried About Running Out of Food in the Last Year:     [address] in the Last Year:    Transportation Needs:     Lack of Transportation (Medical):  Lack of Transportation (Non-Medical):    Physical Activity:     Days of Exercise per Week:     Minutes of Exercise per Session:    Stress:     Feeling of Stress :    Social Connections:     Frequency of Communication with Friends and Family:     Frequency of Social Gatherings with Friends and Family:     Attends Orthodox Services:     Active Member of Clubs or Organizations:     Attends Club or Organization Meetings:     Marital Status:    Intimate Partner Violence:     Fear of Current or Ex-Partner:     Emotionally Abused:     Physically Abused:     Sexually Abused:        Review of Systems  Review of Systems - Review of all systems is negative except as noted above in the HPI.     Vital Signs  Visit Vitals  /60 (BP 1 Location: Right upper arm, BP Patient Position: Sitting, BP Cuff Size: Adult)   Pulse 63   Temp 98 °F (36.7 °C) (Oral)   Resp 16   Ht 5' 1\" (1.549 m)   Wt 114 lb (51.7 kg)   SpO2 99%   BMI 21.54 kg/m²         Physical Exam  Physical Examination: General appearance - oriented to person, place, and time and acyanotic, in no respiratory distress  Mental status - affect appropriate to mood  Neck - supple, no significant adenopathy  Lymphatics - no palpable lymphadenopathy  Chest - decreased air entry noted bilateral lung bases  Heart - S1 and S2 normal  Abdomen - no rebound tenderness noted  Back exam - limited range of motion  Neurological - abnormal neurological exam unchanged from prior examinations  Musculoskeletal - osteoarthritic changes noted in both hands  Extremities - intact peripheral pulses      Results  Results for orders placed or performed in visit on 04/09/21    MAMMOGRAPHY   Result Value Ref Range    Mammography, External         ASSESSMENT and PLAN    ICD-10-CM ICD-9-CM    1. Bronchitis  J40 490 XR CHEST PA LAT      azithromycin (ZITHROMAX) 250 mg tablet      promethazine-dextromethorphan (PROMETHAZINE-DM) 6.25-15 mg/5 mL syrup   2. Essential hypertension  I10 401.9    3. Dyslipidemia  E78.5 272.4    4. Neuropathy  G62.9 355.9      lab results and schedule of future lab studies reviewed with patient  reviewed diet, exercise and weight control  cardiovascular risk and specific lipid/LDL goals reviewed  reviewed medications and side effects in detail  radiology results and schedule of future radiology studies reviewed with patient      I have discussed the diagnosis with the patient and the intended plan of care as seen in the above orders. The patient has received an after-visit summary and questions were answered concerning future plans. I have discussed medication, side effects, and warnings with the patient in detail. The patient verbalized understanding and is in agreement with the plan of care. The patient will contact the office with any additional concerns. Alireza Rodgers MD    PLEASE NOTE:   This document has been produced using voice recognition software.  Unrecognized errors in transcription may be present

## 2021-07-29 ENCOUNTER — LAB ONLY (OUTPATIENT)
Dept: FAMILY MEDICINE CLINIC | Age: 71
End: 2021-07-29
Payer: COMMERCIAL

## 2021-07-29 ENCOUNTER — HOSPITAL ENCOUNTER (OUTPATIENT)
Dept: LAB | Age: 71
Discharge: HOME | End: 2021-07-29
Payer: COMMERCIAL

## 2021-07-29 DIAGNOSIS — E78.5 DYSLIPIDEMIA: ICD-10-CM

## 2021-07-29 DIAGNOSIS — Z01.89 ENCOUNTER FOR LABORATORY TEST: Primary | ICD-10-CM

## 2021-07-29 DIAGNOSIS — I10 ESSENTIAL HYPERTENSION: ICD-10-CM

## 2021-07-29 LAB
ALBUMIN SERPL-MCNC: 4 G/DL (ref 3.4–5)
ALBUMIN/GLOB SERPL: 1.3 {RATIO} (ref 0.8–1.7)
ALP SERPL-CCNC: 85 U/L (ref 45–117)
ALT SERPL-CCNC: 25 U/L (ref 13–56)
ANION GAP SERPL CALC-SCNC: 6 MMOL/L (ref 3–18)
AST SERPL-CCNC: 19 U/L (ref 10–38)
BASOPHILS # BLD: 0 K/UL (ref 0–0.1)
BASOPHILS NFR BLD: 0 % (ref 0–2)
BILIRUB SERPL-MCNC: 0.9 MG/DL (ref 0.2–1)
BUN SERPL-MCNC: 13 MG/DL (ref 7–18)
BUN/CREAT SERPL: 18 (ref 12–20)
CALCIUM SERPL-MCNC: 9.8 MG/DL (ref 8.5–10.1)
CHLORIDE SERPL-SCNC: 111 MMOL/L (ref 100–111)
CHOLEST SERPL-MCNC: 198 MG/DL
CO2 SERPL-SCNC: 25 MMOL/L (ref 21–32)
CREAT SERPL-MCNC: 0.71 MG/DL (ref 0.6–1.3)
DIFFERENTIAL METHOD BLD: ABNORMAL
EOSINOPHIL # BLD: 0.1 K/UL (ref 0–0.4)
EOSINOPHIL NFR BLD: 2 % (ref 0–5)
ERYTHROCYTE [DISTWIDTH] IN BLOOD BY AUTOMATED COUNT: 12.9 % (ref 11.6–14.5)
GLOBULIN SER CALC-MCNC: 3.2 G/DL (ref 2–4)
GLUCOSE SERPL-MCNC: 125 MG/DL (ref 74–99)
HCT VFR BLD AUTO: 35.4 % (ref 35–45)
HDLC SERPL-MCNC: 72 MG/DL (ref 40–60)
HDLC SERPL: 2.8 {RATIO} (ref 0–5)
HGB BLD-MCNC: 11.5 G/DL (ref 12–16)
LDLC SERPL CALC-MCNC: 102.6 MG/DL (ref 0–100)
LIPID PROFILE,FLP: ABNORMAL
LYMPHOCYTES # BLD: 2 K/UL (ref 0.9–3.6)
LYMPHOCYTES NFR BLD: 40 % (ref 21–52)
MCH RBC QN AUTO: 28.8 PG (ref 24–34)
MCHC RBC AUTO-ENTMCNC: 32.5 G/DL (ref 31–37)
MCV RBC AUTO: 88.7 FL (ref 74–97)
MONOCYTES # BLD: 0.5 K/UL (ref 0.05–1.2)
MONOCYTES NFR BLD: 9 % (ref 3–10)
NEUTS SEG # BLD: 2.5 K/UL (ref 1.8–8)
NEUTS SEG NFR BLD: 48 % (ref 40–73)
PLATELET # BLD AUTO: 433 K/UL (ref 135–420)
PMV BLD AUTO: 9.2 FL (ref 9.2–11.8)
POTASSIUM SERPL-SCNC: 3.7 MMOL/L (ref 3.5–5.5)
PROT SERPL-MCNC: 7.2 G/DL (ref 6.4–8.2)
RBC # BLD AUTO: 3.99 M/UL (ref 4.2–5.3)
SODIUM SERPL-SCNC: 142 MMOL/L (ref 136–145)
TRIGL SERPL-MCNC: 117 MG/DL (ref ?–150)
VLDLC SERPL CALC-MCNC: 23.4 MG/DL
WBC # BLD AUTO: 5.1 K/UL (ref 4.6–13.2)

## 2021-07-29 PROCEDURE — 36415 COLL VENOUS BLD VENIPUNCTURE: CPT | Performed by: FAMILY MEDICINE

## 2021-07-29 PROCEDURE — 80061 LIPID PANEL: CPT

## 2021-07-29 PROCEDURE — 85025 COMPLETE CBC W/AUTO DIFF WBC: CPT

## 2021-07-29 PROCEDURE — 80053 COMPREHEN METABOLIC PANEL: CPT

## 2021-08-01 DIAGNOSIS — R73.9 HYPERGLYCEMIA: Primary | ICD-10-CM

## 2021-08-01 DIAGNOSIS — R93.89 ABNORMAL CHEST X-RAY: Primary | ICD-10-CM

## 2021-08-01 NOTE — PROGRESS NOTES
Please let patient know her LDL cholesterol is down to 102. She is on Lipitor 80 mg daily. She should continue with this medication. She should exercise and take a diet low in polysaturated fats. Her blood glucose number is elevated at 125. She needs to come in for diabetes check. We will check her HbA1c. Her hemoglobin was slightly low at 11.5. We will recheck this at next visit.   Arturo Stahl MD

## 2021-08-02 NOTE — PROGRESS NOTES
Spoke with patient. Patient was given lab results. Patient stated she was in the doctor office and will call back for clarification.

## 2021-08-06 DIAGNOSIS — G62.9 NEUROPATHY: ICD-10-CM

## 2021-08-07 ENCOUNTER — HOSPITAL ENCOUNTER (OUTPATIENT)
Dept: CT IMAGING | Age: 71
Discharge: HOME | End: 2021-08-07
Attending: FAMILY MEDICINE
Payer: MEDICARE

## 2021-08-07 DIAGNOSIS — R93.89 ABNORMAL CHEST X-RAY: ICD-10-CM

## 2021-08-07 PROCEDURE — 74011000636 HC RX REV CODE- 636: Performed by: FAMILY MEDICINE

## 2021-08-07 PROCEDURE — 71260 CT THORAX DX C+: CPT

## 2021-08-07 RX ADMIN — IOPAMIDOL 67 ML: 612 INJECTION, SOLUTION INTRAVENOUS at 11:01

## 2021-08-12 RX ORDER — PREGABALIN 50 MG/1
CAPSULE ORAL
Qty: 60 CAPSULE | Refills: 2 | Status: SHIPPED | OUTPATIENT
Start: 2021-08-12

## 2021-08-12 NOTE — PROGRESS NOTES
Please let patient know CT scan of the chest is reassuring. We will discuss this further at next visit.   Cristina Larkin MD

## 2021-08-18 NOTE — PROGRESS NOTES
Spoke with patient. Patient was given results. Patient stated understanding. Patient didn't have any question or concerns.

## 2021-09-27 DIAGNOSIS — R05.9 COUGH: ICD-10-CM

## 2021-09-29 RX ORDER — ALBUTEROL SULFATE 90 UG/1
AEROSOL, METERED RESPIRATORY (INHALATION)
Qty: 8.5 G | Refills: 2 | Status: SHIPPED | OUTPATIENT
Start: 2021-09-29

## 2021-10-11 ENCOUNTER — TELEPHONE (OUTPATIENT)
Dept: PHARMACY | Age: 71
End: 2021-10-11

## 2021-10-11 NOTE — TELEPHONE ENCOUNTER
CLINICAL PHARMACY: ADHERENCE REVIEW  Identified care gap per Jeffry; fills at [facility]: ACE/ARB and Statin adherence    Last Visit: 7/20/21    Patient identified as LIS = 2, therefore patient may be able to receive a 90-day supply for the same cost as a 30-day supply    ASSESSMENT  ACE/ARB ADHERENCE    Per Insurance Records through 9/19/21 ([place] = <80%; [place] = 75%; Potential Fail Date: 10/14/21):   Valsartan 160 mg tablets last filled on 7/6/21 for 90 day supply. Next refill due: 10/4/21    Per [facility]:   Valsartan 160 mg tablets last picked up on 10/9/21 for 90 day supply. 0 refills. Billed through [facility]. Has upcoming appointment PCP. BP Readings from Last 3 Encounters:   07/20/21 127/60   04/20/21 134/70   11/03/20 119/80     Estimated Creatinine Clearance: 54.8 mL/min (by C-G formula based on SCr of 0.71 mg/dL). [facility]    Per Insurance Records through 9/19/21 ([address] [place] = <80%; YTD PDC = 75%; Potential Fail Date: 10/14/21): Atorvastatin 80 mg tablets last filled on 7/6/21 for 90 day supply. Next refill due: 10/4/21    Per [facility]:   Atorvastatin 80 mg tablets last picked up on 10/9/21 for 90 day supply. 0 refills remaining. Billed through [facility]. Has upcoming appointment PCP    Lab Results   Component Value Date/Time    Cholesterol, total 198 07/29/2021 09:20 AM    HDL Cholesterol 72 (H) 07/29/2021 09:20 AM    LDL, calculated 102.6 (H) 07/29/2021 09:20 AM    VLDL, calculated 23.4 07/29/2021 09:20 AM    Triglyceride 117 07/29/2021 09:20 AM    CHOL/HDL Ratio 2.8 07/29/2021 09:20 AM     ALT (SGPT)   Date Value Ref Range Status   07/29/2021 25 13 - 56 U/L Final     AST (SGOT)   Date Value Ref Range Status   07/29/2021 19 10 - 38 U/L Final     The 10-year ASCVD risk score (Bela Contreras, et al., 2013) is: 23.9%    Values used to calculate the score:      Age: 69 years      Sex: Female      Is Non- : Yes      Diabetic: No      Tobacco smoker:  Yes Systolic Blood Pressure: 606 mmHg      Is BP treated: Yes      HDL Cholesterol: 72 MG/DL      Total Cholesterol: 198 MG/DL     PLAN  No patient out reach planned at this time.     Future Appointments   Date Time Provider Deidra Mayorga   10/20/2021  8:00 AM Meenakshi Robertson MD [facility] AMB   4/20/2022  9:00 AM Meenakshi Robertson MD [facility] AMB Jenise Mole, PharmD, [facility], [address]  Toll free: [phone], option 2    For Pharmacy [address] [place] [address] Road in place: No   Gap Closed?: Yes   Time Spent (min): 10

## 2022-01-02 DIAGNOSIS — I10 ESSENTIAL HYPERTENSION: ICD-10-CM

## 2022-01-02 DIAGNOSIS — E78.5 DYSLIPIDEMIA: ICD-10-CM

## 2022-01-02 RX ORDER — ATORVASTATIN CALCIUM 80 MG/1
TABLET, FILM COATED ORAL
Qty: 90 TABLET | Refills: 1 | Status: SHIPPED | OUTPATIENT
Start: 2022-01-02 | End: 2022-07-01

## 2022-01-02 RX ORDER — VALSARTAN 160 MG/1
TABLET ORAL
Qty: 90 TABLET | Refills: 1 | Status: SHIPPED | OUTPATIENT
Start: 2022-01-02

## 2022-03-18 PROBLEM — E78.5 DYSLIPIDEMIA: Status: ACTIVE | Noted: 2018-01-09

## 2022-03-19 PROBLEM — Z85.3 HISTORY OF BREAST CANCER: Status: ACTIVE | Noted: 2018-01-09

## 2022-03-19 PROBLEM — H54.40 BLIND LEFT EYE: Status: ACTIVE | Noted: 2018-01-09

## 2022-03-19 PROBLEM — I10 ESSENTIAL HYPERTENSION: Status: ACTIVE | Noted: 2018-01-09

## 2022-03-19 PROBLEM — Z90.12 H/O LEFT MASTECTOMY: Status: ACTIVE | Noted: 2018-01-09

## 2022-03-19 PROBLEM — I73.9 PERIPHERAL VASCULAR DISEASE (HCC): Status: ACTIVE | Noted: 2019-12-16

## 2022-03-19 PROBLEM — H40.9 GLAUCOMA OF RIGHT EYE: Status: ACTIVE | Noted: 2018-01-09

## 2022-03-20 PROBLEM — M25.551 PAIN OF RIGHT HIP JOINT: Status: ACTIVE | Noted: 2018-01-09

## 2022-04-07 ENCOUNTER — OFFICE VISIT (OUTPATIENT)
Dept: FAMILY MEDICINE CLINIC | Age: 72
End: 2022-04-07
Payer: MEDICARE

## 2022-04-07 VITALS
DIASTOLIC BLOOD PRESSURE: 72 MMHG | BODY MASS INDEX: 21.71 KG/M2 | OXYGEN SATURATION: 95 % | RESPIRATION RATE: 20 BRPM | HEART RATE: 74 BPM | SYSTOLIC BLOOD PRESSURE: 129 MMHG | WEIGHT: 115 LBS | TEMPERATURE: 98.2 F | HEIGHT: 61 IN

## 2022-04-07 DIAGNOSIS — F39 MOOD DISORDER (HCC): ICD-10-CM

## 2022-04-07 DIAGNOSIS — I10 ESSENTIAL HYPERTENSION: ICD-10-CM

## 2022-04-07 DIAGNOSIS — Z71.89 ADVANCED DIRECTIVES, COUNSELING/DISCUSSION: ICD-10-CM

## 2022-04-07 DIAGNOSIS — J30.0 VASOMOTOR RHINITIS: ICD-10-CM

## 2022-04-07 DIAGNOSIS — Z23 ENCOUNTER FOR IMMUNIZATION: ICD-10-CM

## 2022-04-07 DIAGNOSIS — J40 BRONCHITIS: Primary | ICD-10-CM

## 2022-04-07 DIAGNOSIS — E78.5 DYSLIPIDEMIA: ICD-10-CM

## 2022-04-07 DIAGNOSIS — C50.912 MALIGNANT NEOPLASM OF LEFT FEMALE BREAST, UNSPECIFIED ESTROGEN RECEPTOR STATUS, UNSPECIFIED SITE OF BREAST (HCC): ICD-10-CM

## 2022-04-07 DIAGNOSIS — I73.9 PERIPHERAL VASCULAR DISEASE (HCC): ICD-10-CM

## 2022-04-07 DIAGNOSIS — R06.2 WHEEZE: ICD-10-CM

## 2022-04-07 DIAGNOSIS — Z00.00 MEDICARE ANNUAL WELLNESS VISIT, SUBSEQUENT: ICD-10-CM

## 2022-04-07 DIAGNOSIS — Z13.31 SCREENING FOR DEPRESSION: ICD-10-CM

## 2022-04-07 DIAGNOSIS — G62.9 NEUROPATHY: ICD-10-CM

## 2022-04-07 PROCEDURE — 1101F PT FALLS ASSESS-DOCD LE1/YR: CPT | Performed by: FAMILY MEDICINE

## 2022-04-07 PROCEDURE — G8399 PT W/DXA RESULTS DOCUMENT: HCPCS | Performed by: FAMILY MEDICINE

## 2022-04-07 PROCEDURE — G8510 SCR DEP NEG, NO PLAN REQD: HCPCS | Performed by: FAMILY MEDICINE

## 2022-04-07 PROCEDURE — G8752 SYS BP LESS 140: HCPCS | Performed by: FAMILY MEDICINE

## 2022-04-07 PROCEDURE — 99214 OFFICE O/P EST MOD 30 MIN: CPT | Performed by: FAMILY MEDICINE

## 2022-04-07 PROCEDURE — G8427 DOCREV CUR MEDS BY ELIG CLIN: HCPCS | Performed by: FAMILY MEDICINE

## 2022-04-07 PROCEDURE — G8420 CALC BMI NORM PARAMETERS: HCPCS | Performed by: FAMILY MEDICINE

## 2022-04-07 PROCEDURE — G9899 SCRN MAM PERF RSLTS DOC: HCPCS | Performed by: FAMILY MEDICINE

## 2022-04-07 PROCEDURE — G8754 DIAS BP LESS 90: HCPCS | Performed by: FAMILY MEDICINE

## 2022-04-07 PROCEDURE — 3017F COLORECTAL CA SCREEN DOC REV: CPT | Performed by: FAMILY MEDICINE

## 2022-04-07 PROCEDURE — 90471 IMMUNIZATION ADMIN: CPT | Performed by: FAMILY MEDICINE

## 2022-04-07 PROCEDURE — G8536 NO DOC ELDER MAL SCRN: HCPCS | Performed by: FAMILY MEDICINE

## 2022-04-07 PROCEDURE — 90715 TDAP VACCINE 7 YRS/> IM: CPT | Performed by: FAMILY MEDICINE

## 2022-04-07 PROCEDURE — G0444 DEPRESSION SCREEN ANNUAL: HCPCS | Performed by: FAMILY MEDICINE

## 2022-04-07 PROCEDURE — 1090F PRES/ABSN URINE INCON ASSESS: CPT | Performed by: FAMILY MEDICINE

## 2022-04-07 PROCEDURE — G0439 PPPS, SUBSEQ VISIT: HCPCS | Performed by: FAMILY MEDICINE

## 2022-04-07 RX ORDER — BUDESONIDE AND FORMOTEROL FUMARATE DIHYDRATE 160; 4.5 UG/1; UG/1
2 AEROSOL RESPIRATORY (INHALATION) 2 TIMES DAILY
Qty: 10.2 G | Refills: 1 | Status: SHIPPED | OUTPATIENT
Start: 2022-04-07 | End: 2022-04-11

## 2022-04-07 RX ORDER — PREDNISONE 10 MG/1
TABLET ORAL
Qty: 18 TABLET | Refills: 0 | Status: SHIPPED | OUTPATIENT
Start: 2022-04-07

## 2022-04-07 RX ORDER — MIRTAZAPINE 7.5 MG/1
7.5 TABLET, FILM COATED ORAL
Qty: 30 TABLET | Refills: 2 | Status: SHIPPED | OUTPATIENT
Start: 2022-04-07

## 2022-04-07 RX ORDER — BENZONATATE 100 MG/1
100 CAPSULE ORAL
COMMUNITY
Start: 2022-04-01

## 2022-04-07 NOTE — PROGRESS NOTES
After obtaining consent, and per orders of Dr. Lauren Bower, injection of T-Dap given by Roseann Turcios. Patient instructed to remain in clinic for 20 minutes afterwards, and to report any adverse reaction to me immediately.

## 2022-04-07 NOTE — ACP (ADVANCE CARE PLANNING)
Advance Care Planning     Advance Care Planning (ACP) Physician/NP/PA Conversation      Date of Conversation: 4/7/2022  Conducted with: Patient with Decision Making Capacity    Healthcare Decision Maker:     Primary Decision Maker (Active): Marva Montenegro - Mary - [phone]    Primary Decision Maker (Active): Arno Cushing - [phone]  Click here to complete [employer] including selection of the Healthcare Decision Maker Relationship (ie \"Primary\")      Today we documented Decision Maker(s). The patient will provide ACP documents. Care Preferences:    Hospitalization: \"If your health worsens and it becomes clear that your chance of recovery is unlikely, what would be your preference regarding hospitalization? \"  The patient would prefer hospitalization. Ventilation: \"If you were unable to breathe on your own and your chance of recovery was unlikely, what would be your preference about the use of a ventilator (breathing machine) if it was available to you? \"   The patient would desire the use of a ventilator. Resuscitation: \"In the event your heart stopped as a result of an underlying serious health condition, would you want attempts to be made to restart your heart, or would you prefer a natural death? \"   Yes, attempt to resuscitate.     Additional topics discussed: treatment goals, ventilation preferences, hospitalization preferences and resuscitation preferences    Conversation Outcomes / Follow-Up Plan:   ACP in process - completing/providing documents   Reviewed DNR/DNI and patient elects Full Code (Attempt Resuscitation)     Length of Voluntary ACP Conversation in minutes:  16 minutes    Blanche Rader MD

## 2022-04-07 NOTE — PATIENT INSTRUCTIONS
Medicare Wellness Visit, Female     The best way to live healthy is to have a lifestyle where you eat a well-balanced diet, exercise regularly, limit alcohol use, and quit all forms of tobacco/nicotine, if applicable. Regular preventive services are another way to keep healthy. Preventive services (vaccines, screening tests, monitoring & exams) can help personalize your care plan, which helps you manage your own care. Screening tests can find health problems at the earliest stages, when they are easiest to treat. [facility] follows the current, evidence-based guidelines published by the [facility] ([facility]) when recommending preventive services for our patients. Because we follow these guidelines, sometimes recommendations change over time as research supports it. (For example, mammograms used to be recommended annually. Even though Medicare will still pay for an annual mammogram, the newer guidelines recommend a mammogram every two years for women of average risk). Of course, you and your doctor may decide to screen more often for some diseases, based on your risk and your co-morbidities (chronic disease you are already diagnosed with). Preventive services for you include:  - Medicare offers their members a free annual wellness visit, which is time for you and your primary care provider to discuss and plan for your preventive service needs. Take advantage of this benefit every year!  -All adults over the age of 72 should receive the recommended pneumonia vaccines. Current USPSTF guidelines recommend a series of two vaccines for the best pneumonia protection.   -All adults should have a flu vaccine yearly and a tetanus vaccine every 10 years.   -All adults age 48 and older should receive the shingles vaccines (series of two vaccines).       -All adults age 38-68 who are overweight should have a diabetes screening test once every three years.   -All adults born between 80 and 1965 should be screened once for Hepatitis C.  -Other screening tests and preventive services for persons with diabetes include: an eye exam to screen for diabetic retinopathy, a kidney function test, a foot exam, and stricter control over your cholesterol.   -Cardiovascular screening for adults with routine risk involves an electrocardiogram (ECG) at intervals determined by your doctor.   -Colorectal cancer screenings should be done for adults age 54-65 with no increased risk factors for colorectal cancer. There are a number of acceptable methods of screening for this type of cancer. Each test has its own benefits and drawbacks. Discuss with your doctor what is most appropriate for you during your annual wellness visit. The different tests include: colonoscopy (considered the best screening method), a fecal occult blood test, a fecal DNA test, and sigmoidoscopy.    -A bone mass density test is recommended when a woman turns 65 to screen for osteoporosis. This test is only recommended one time, as a screening. Some providers will use this same test as a disease monitoring tool if you already have osteoporosis. -Breast cancer screenings are recommended every other year for women of normal risk, age 54-69.  -Cervical cancer screenings for women over age 72 are only recommended with certain risk factors. Here is a list of your current Health Maintenance items (your personalized list of preventive services) with a due date:  Health Maintenance Due   Topic Date Due    DTaP/Tdap/Td  (1 - Tdap) Never done    Shingles Vaccine (1 of 2) Never done    COVID-19 Vaccine (3 - Booster) 10/10/2021    Mammogram  04/09/2022    Annual Well Visit  04/21/2022         Medicare Wellness Visit, Female     The best way to live healthy is to have a lifestyle where you eat a well-balanced diet, exercise regularly, limit alcohol use, and quit all forms of tobacco/nicotine, if applicable.      Regular preventive services are another way to keep healthy. Preventive services (vaccines, screening tests, monitoring & exams) can help personalize your care plan, which helps you manage your own care. Screening tests can find health problems at the earliest stages, when they are easiest to treat. [facility] follows the current, evidence-based guidelines published by the [facility] ([facility]) when recommending preventive services for our patients. Because we follow these guidelines, sometimes recommendations change over time as research supports it. (For example, mammograms used to be recommended annually. Even though Medicare will still pay for an annual mammogram, the newer guidelines recommend a mammogram every two years for women of average risk). Of course, you and your doctor may decide to screen more often for some diseases, based on your risk and your co-morbidities (chronic disease you are already diagnosed with). Preventive services for you include:  - Medicare offers their members a free annual wellness visit, which is time for you and your primary care provider to discuss and plan for your preventive service needs. Take advantage of this benefit every year!  -All adults over the age of 72 should receive the recommended pneumonia vaccines. Current USPSTF guidelines recommend a series of two vaccines for the best pneumonia protection.   -All adults should have a flu vaccine yearly and a tetanus vaccine every 10 years.   -All adults age 48 and older should receive the shingles vaccines (series of two vaccines).       -All adults age 38-68 who are overweight should have a diabetes screening test once every three years.   -All adults born between 80 and 1965 should be screened once for Hepatitis C.  -Other screening tests and preventive services for persons with diabetes include: an eye exam to screen for diabetic retinopathy, a kidney function test, a foot exam, and stricter control over your cholesterol.   -Cardiovascular screening for adults with routine risk involves an electrocardiogram (ECG) at intervals determined by your doctor.   -Colorectal cancer screenings should be done for adults age 54-65 with no increased risk factors for colorectal cancer. There are a number of acceptable methods of screening for this type of cancer. Each test has its own benefits and drawbacks. Discuss with your doctor what is most appropriate for you during your annual wellness visit. The different tests include: colonoscopy (considered the best screening method), a fecal occult blood test, a fecal DNA test, and sigmoidoscopy.    -A bone mass density test is recommended when a woman turns 65 to screen for osteoporosis. This test is only recommended one time, as a screening. Some providers will use this same test as a disease monitoring tool if you already have osteoporosis. -Breast cancer screenings are recommended every other year for women of normal risk, age 54-69.  -Cervical cancer screenings for women over age 72 are only recommended with certain risk factors.      Here is a list of your current Health Maintenance items (your personalized list of preventive services) with a due date:  Health Maintenance Due   Topic Date Due    Shingles Vaccine (1 of 2) Never done    COVID-19 Vaccine (3 - Booster) 10/10/2021    Mammogram  04/09/2022

## 2022-04-07 NOTE — PROGRESS NOTES
This is the Subsequent Medicare Annual Wellness Exam, performed 12 months or more after the Initial AWV or the last Subsequent AWV    I have reviewed the patient's medical history in detail and updated the computerized patient record. Chief Complaint   Patient presents with   [facility] Annual Wellness Visit     1. \"Have you been to the ER, urgent care clinic since your last visit? Hospitalized since your last visit? \" Yes When: 04- Where: [facility] Reason for visit: chest pain    2. \"Have you seen or consulted any other health care providers outside of the [address] [place] [address] since your last visit? \" No     3. For patients aged 39-70: Has the patient had a colonoscopy / FIT/ Cologuard? Yes - no Care Gap present      If the patient is female:    4. For patients aged 41-77: Has the patient had a mammogram within the past 2 years? No      5. For patients aged 21-65: Has the patient had a pap smear? NA - based on age or sex        Assessment/Plan   Education and counseling provided:  Are appropriate based on today's review and evaluation    1. Medicare annual wellness visit, subsequent    2. Encounter for immunization  - TETANUS, DIPHTHERIA TOXOIDS AND ACELLULAR PERTUSSIS VACCINE (TDAP), IN INDIVIDS. >=7, IM    3.  Advanced directives, counseling/discussion  - ADVANCE CARE PLANNING FIRST 27 MINS  - FULL CODE    4. Screening for depression  - DEPRESSION SCREEN ANNUAL       Depression Risk Factor Screening     3 most recent PHQ Screens 4/7/2022   Little interest or pleasure in doing things Not at all   Feeling down, depressed, irritable, or hopeless Not at all   Total Score PHQ 2 0   Trouble falling or staying asleep, or sleeping too much Several days   Feeling tired or having little energy Several days   Poor appetite, weight loss, or overeating Nearly every day   Feeling bad about yourself - or that you are a failure or have let yourself or your family down Not at all   Trouble concentrating on things such as school, work, reading, or watching TV Not at all   Moving or speaking so slowly that other people could have noticed; or the opposite being so fidgety that others notice Not at all   Thoughts of being better off dead, or hurting yourself in some way Not at all   PHQ 9 Score 5   How difficult have these problems made it for you to do your work, take care of your home and get along with others Not difficult at all   8 minutes taken on depression screening. Alcohol & Drug Abuse Risk Screen    Do you average more than 1 drink per night or more than 7 drinks a week:  No    On any one occasion in the past three months have you have had more than 3 drinks containing alcohol:  No         Opioid Risk: (Low risk score <55, High risk score ?55)  Opioid risk score: 19      Click here to complete the Controlled Substance Monitoring SmartForm    Last PDMP [place]avier as Reviewed:  Review User Review Instant Review Result              Functional Ability and Level of Safety       1. Was the patient's timed Up and GO test unsteady or longer than 30 seconds? No  2. Does the patient need help with the phone, transportation, shopping, preparing meals, housework, laundry, medications or managing money? Yes  3. Does the patients' home have rugs in the hallway, lack grab bars in the bathroom, lack handrails on the stairs or have poor lighting? No  4. Have you noticed any hearing difficulties? No  Hearing Evaluation:    Hearing: Hearing is good. Activities of Daily Living: The home contains: no safety equipment. Patient needs help with:  transportation      Ambulation: with difficulty, uses a cane     Fall Risk:  Fall Risk Assessment, last 12 mths 4/7/2022   Able to walk? Yes   Fall in past 12 months? 0   Do you feel unsteady?  0   Are you worried about falling 0      Abuse Screen:  Patient is not abused       Cognitive Screening    Has your family/caregiver stated any concerns about your memory: no     Cognitive Screening: Normal - Verbal Fluency Test    Health Maintenance Due     Health Maintenance Due   Topic Date Due    DTaP/Tdap/Td series (1 - Tdap) Never done    Shingrix Vaccine Age 50> (1 of 2) Never done    COVID-19 Vaccine (3 - Booster) 10/10/2021    Breast Cancer Screen Mammogram  04/09/2022    Medicare Yearly Exam  04/21/2022       Patient Care Team   Patient Care Team:  Justino Klein MD as PCP - General (Family Medicine)  Justino Klein MD as PCP - [facility] Empaneled Provider  Martha Cohn MD (Ophthalmology)  Serafin Krishna MD (Surgery)  Lewis Ramos MD (Hematology and Oncology)  Anais Hillman MD as Consulting Provider (Neurology)    History   Jorge Posey comes in for Medicare wellness exam    Patient Active Problem List   Diagnosis Code    History of breast cancer Z85.3    H/O left mastectomy Z90.12    Dyslipidemia E78.5    Essential hypertension I10    Pain of right hip joint M25.551    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    Blind left eye H54.40    Peripheral vascular disease ([address] I73.9     Past Medical History:   Diagnosis Date    Arthritis     Blind left eye     Breast cancer ([address]     Glaucoma     High cholesterol     Hip pain     Hypertension       Past Surgical History:   Procedure Laterality Date    COLONOSCOPY N/A 5/9/2018    COLONOSCOPY / polypectomy [facility]    HX COLONOSCOPY  2008    hx of polyps    HX MASTECTOMY Left     VASCULAR SURGERY PROCEDURE UNLIST Bilateral     vein cleaning to help with nerves     Current Outpatient Medications   Medication Sig Dispense Refill    valsartan (DIOVAN) 160 mg tablet TAKE 1 TABLET BY MOUTH DAILY 90 Tablet 1    atorvastatin (LIPITOR) 80 mg tablet TAKE 1 TABLET BY MOUTH DAILY 90 Tablet 1    albuterol (PROVENTIL HFA, VENTOLIN HFA, PROAIR HFA) 90 mcg/actuation inhaler INHALE 2 PUFFS BY MOUTH EVERY 6 HOURS AS NEEDED FOR WHEEZING 8.5 g 2    pregabalin (LYRICA) 50 mg capsule TAKE 1 CAPSULE BY MOUTH TWICE DAILY. MAX DAILY AMOUNT: 100 MG 60 Capsule 2    amLODIPine (NORVASC) 10 mg tablet TAKE 1 TABLET BY MOUTH DAILY 90 Tablet 1    cyclobenzaprine (FLEXERIL) 10 mg tablet Take 1 Tab by mouth three (3) times daily as needed for Muscle Spasm(s). 60 Tab 3    fluticasone propionate (FLONASE) 50 mcg/actuation nasal spray SHAKE LIQUID AND USE 2 SPRAYS IN EACH NOSTRIL DAILY 48 g 1    aspirin delayed-release 81 mg tablet Take  by mouth daily.  COMBIGAN 0.2-0.5 % drop ophthalmic solution INSTILL 1 DROP DAILY IN RIGHT EYE  3     No Known Allergies    Family History   Problem Relation Age of Onset    Diabetes Mother     Diabetes Father     Colon Polyps Sister      Social History     Tobacco Use    Smoking status: Light Tobacco Smoker     Types: Cigarettes    Smokeless tobacco: Never Used   Substance Use Topics    Alcohol use: Yes     Alcohol/week: 1.0 standard drink     Types: 1 Cans of beer per week     Comment: occ     I have discussed the diagnosis with the patient and the intended plan of care as seen in the above orders. The patient has received an after-visit summary and questions were answered concerning future plans. I have discussed medication, side effects, and warnings with the patient in detail. The patient verbalized understanding and is in agreement with the plan of care. The patient will contact the office with any additional concerns. Personalized preventative plan of care was discussed, printed and given to patient.     Meggan Hart MD

## 2022-04-07 NOTE — PROGRESS NOTES
[facility]  Tello Jolly comes in for f/u care. Bronchitis: Patient was recently seen in the emergency room and put on azithromycin for bronchitis. She continues to cough. She also has a wheeze. Cough is productive of clear phlegm. She denies fever, chills or chest pain. She has the chest tightness. She is using albuterol inhaler. I will add Symbicort to take twice a day. I will give a prednisone taper. We will follow-up in case of no improvement or worsening symptoms. Poor appetite: Patient has noted poor appetite. She denies abdominal pain, nausea or vomiting. Her weight has been going up gradually and she is up 6 pounds from last year. She however would like medication to help with appetite. I will give Remeron. HTN: Patient has hypertension. Blood pressure is stable. She denies headache, changes in vision or focal weakness. She is on valsartan and amlodipine. She will continue with these medications. Dyslipidemia: Patient has dyslipidemia. She takes atorvastatin. Stable on the medication. We will continue current treatment plan. Neuropathy: Patient has neuropathy with numbness and tingling of the hands and the feet. She is on Lyrica. Stable on the medication. We will continue current treatment plan. Mood disorder: Patient has mood disorder. She feels depressed. She denies any suicidal ideation. She is not on medication. We discussed management options. She would like to try medication. I will send in remeron. I will follow-up at next visit. Breast cancer: Patient has a history of left breast cancer. Personal h/o T1cN0(i-) Left breast cancer s/p Mastectomy, SLN bx and adjuvant ChemoRx. Patient took Arimidex and has completed 5 years of this. She has been stable. She was seen by the breast specialist and oncologist.  PVD:  Patient has history of peripheral vascular disease. Has had atherectomy left lower extremity done in the past.  She was seen by Dr Fernando Worley. Currently stable. Declines to have follow-up appointment scheduled at the moment. She feels stable. She denies discoloration of pain lower extremity. Past Medical History  Past Medical History:   Diagnosis Date    Arthritis     Blind left eye     Breast cancer ([address]     Glaucoma     High cholesterol     Hip pain     Hypertension        Surgical History  Past Surgical History:   Procedure Laterality Date    COLONOSCOPY N/A 5/9/2018    COLONOSCOPY / polypect[facility] END[facility]COPY    HX COLONOSCOPY  2008    hx of polyps    HX MASTECTOMY Left     VASCULAR SURGERY PROCEDURE UNLIST Bilateral     vein cleaning to help with nerves        Medications  Current Outpatient Medications   Medication Sig Dispense Refill    benzonatate (TESSALON) 100 mg capsule Take 100 mg by mouth three (3) times daily as needed.  valsartan (DIOVAN) 160 mg tablet TAKE 1 TABLET BY MOUTH DAILY 90 Tablet 1    atorvastatin (LIPITOR) 80 mg tablet TAKE 1 TABLET BY MOUTH DAILY 90 Tablet 1    albuterol (PROVENTIL HFA, VENTOLIN HFA, PROAIR HFA) 90 mcg/actuation inhaler INHALE 2 PUFFS BY MOUTH EVERY 6 HOURS AS NEEDED FOR WHEEZING 8.5 g 2    pregabalin (LYRICA) 50 mg capsule TAKE 1 CAPSULE BY MOUTH TWICE DAILY. MAX DAILY AMOUNT: 100 MG 60 Capsule 2    amLODIPine (NORVASC) 10 mg tablet TAKE 1 TABLET BY MOUTH DAILY 90 Tablet 1    cyclobenzaprine (FLEXERIL) 10 mg tablet Take 1 Tab by mouth three (3) times daily as needed for Muscle Spasm(s). 60 Tab 3    fluticasone propionate (FLONASE) 50 mcg/actuation nasal spray SHAKE LIQUID AND USE 2 SPRAYS IN EACH NOSTRIL DAILY 48 g 1    aspirin delayed-release 81 mg tablet Take  by mouth daily.       COMBIGAN 0.2-0.5 % drop ophthalmic solution INSTILL 1 DROP DAILY IN RIGHT EYE  3       Allergies  No Known Allergies    Family History  Family History   Problem Relation Age of Onset    Diabetes Mother     Diabetes Father     Colon Polyps Sister        Social History  Social History     Socioeconomic History    Marital status:      Spouse name: Not on file    Number of children: Not on file    Years of education: Not on file    Highest education level: Not on file   Occupational History    Occupation: Retired   Tobacco Use    Smoking status: Light Tobacco Smoker     Types: Cigarettes    Smokeless tobacco: Never Used   Vaping Use    Vaping Use: Never used   Substance and Sexual Activity    Alcohol use: Yes     Alcohol/week: 1.0 standard drink     Types: 1 Cans of beer per week     Comment: occ    Drug use: Not Currently     Types: Prescription    Sexual activity: Never   Other Topics Concern     Service No    Blood Transfusions No    Caffeine Concern No    Occupational Exposure No    Hobby Hazards No    Sleep Concern No    Stress Concern No    Weight Concern No    Special Diet No    Back Care No    Exercise No    Bike Helmet No    Seat Belt Yes    Self-Exams Yes   Social History Narrative    Not on file     Social Determinants of Health     Financial Resource Strain:     Difficulty of Paying Living Expenses: Not on file   Food Insecurity:     Worried About Running Out of Food in the Last Year: Not on file    Harry of Food in the Last Year: Not on file   Transportation Needs:     Lack of Transportation (Medical): Not on file    Lack of Transportation (Non-Medical):  Not on file   Physical Activity:     Days of Exercise per Week: Not on file    Minutes of Exercise per Session: Not on file   Stress:     Feeling of Stress : Not on file   Social Connections:     Frequency of Communication with Friends and Family: Not on file    Frequency of Social Gatherings with Friends and Family: Not on file    Attends [employer] Services: Not on file    Active Member of Clubs or Organizations: Not on file    Attends Club or Organization Meetings: Not on file    Marital Status: Not on file   Intimate Partner Violence:     Fear of Current or Ex-Partner: Not on file    Emotionally Abused: Not on file    Physically Abused: Not on file    Sexually Abused: Not on file   Housing Stability:     Unable to Pay for Housing in the Last Year: Not on file    Number of Places Lived in the Last Year: Not on file    Unstable Housing in the Last Year: Not on file       Review of Systems  Review of Systems - Review of all systems is negative except as noted above in the HPI. Vital Signs  Visit Vitals  /72 (BP 1 Location: Left upper arm, BP Patient Position: Sitting, BP Cuff Size: Adult)   Pulse 74   Temp 98.2 °F (36.8 °C) (Temporal)   Resp 20   Ht 5' 1\" (1.549 m)   Wt 115 lb (52.2 kg)   SpO2 95%   BMI 21.73 kg/m²         Physical Exam  Physical Examination: General appearance - alert, well appearing, and in no distress, oriented to person, place, and time and acyanotic, in no respiratory distress  Mental status - alert, oriented to person, place, and time, affect appropriate to mood  Lymphatics - no palpable lymphadenopathy  Chest - no tachypnea, retractions or cyanosis  Heart - S1 and S2 normal  Abdomen - no rebound tenderness noted  Back exam - limited range of motion  Neurological - abnormal neurological exam unchanged from prior examinations  Musculoskeletal - osteoarthritic changes noted in both hands  Extremities - intact peripheral pulses      Results  Results for orders placed or performed during the hospital encounter of 07/29/21   CBC WITH AUTOMATED DIFF   Result Value Ref Range    WBC 5.1 4.6 - 13.2 K/uL    RBC 3.99 (L) 4.20 - 5.30 M/uL    HGB 11.5 (L) 12.0 - 16.0 g/dL    HCT 35.4 35.0 - 45.0 %    MCV 88.7 74.0 - 97.0 FL    MCH 28.8 24.0 - 34.0 PG    MCHC 32.5 31.0 - 37.0 g/dL    RDW 12.9 11.6 - 14.5 %    PLATELET 796 (H) 303 - 420 K/uL    MPV 9.2 9.2 - 11.8 FL    NEUTROPHILS 48 40 - 73 %    LYMPHOCYTES 40 21 - 52 %    MONOCYTES 9 3 - 10 %    EOSINOPHILS 2 0 - 5 %    BASOPHILS 0 0 - 2 %    ABS. NEUTROPHILS 2.5 1.8 - 8.0 K/UL    ABS.  LYMPHOCYTES 2.0 0.9 - 3.6 K/UL    ABS. MONOCYTES 0.5 0.05 - 1.2 K/UL    ABS. EOSINOPHILS 0.1 0.0 - 0.4 K/UL    ABS. BASOPHILS 0.0 0.0 - 0.1 K/UL    DF AUTOMATED     LIPID PANEL   Result Value Ref Range    LIPID PROFILE          Cholesterol, total 198 <200 MG/DL    Triglyceride 117 <150 MG/DL    HDL Cholesterol 72 (H) 40 - 60 MG/DL    LDL, calculated 102.6 (H) 0 - 100 MG/DL    VLDL, calculated 23.4 MG/DL    CHOL/HDL Ratio 2.8 0 - 5.0     METABOLIC PANEL, COMPREHENSIVE   Result Value Ref Range    Sodium 142 136 - 145 mmol/L    Potassium 3.7 3.5 - 5.5 mmol/L    Chloride 111 100 - 111 mmol/L    CO2 25 21 - 32 mmol/L    Anion gap 6 3.0 - 18 mmol/L    Glucose 125 (H) 74 - 99 mg/dL    BUN 13 7.0 - 18 MG/DL    Creatinine 0.71 0.6 - 1.3 MG/DL    BUN/Creatinine ratio 18 12 - 20      GFR est AA >60 >60 ml/min/1.73m2    GFR est non-AA >60 >60 ml/min/1.73m2    Calcium 9.8 8.5 - 10.1 MG/DL    Bilirubin, total 0.9 0.2 - 1.0 MG/DL    ALT (SGPT) 25 13 - 56 U/L    AST (SGOT) 19 10 - 38 U/L    Alk. phosphatase 85 45 - 117 U/L    Protein, total 7.2 6.4 - 8.2 g/dL    Albumin 4.0 3.4 - 5.0 g/dL    Globulin 3.2 2.0 - 4.0 g/dL    A-G Ratio 1.3 0.8 - 1.7         ASSESSMENT and PLAN    ICD-10-CM ICD-9-CM    1. Bronchitis  J40 490 predniSONE (DELTASONE) 10 mg tablet      budesonide-formoteroL (SYMBICORT) 160-4.5 mcg/actuation HFAA   2. Essential hypertension  I10 401.9    3. Dyslipidemia  E78.5 272.4    4. Neuropathy  G62.9 355.9    5. Mood disorder (HCC)  F39 296.90 mirtazapine (REMERON) 7.5 mg tablet   6. Peripheral vascular disease (HCC)  I73.9 443.9    7. Vasomotor rhinitis  J30.0 477.9    8. Wheeze  R06.2 786.07 predniSONE (DELTASONE) 10 mg tablet      budesonide-formoteroL (SYMBICORT) 160-4.5 mcg/actuation HFAA   9. Malignant neoplasm of left female breast, unspecified estrogen receptor status, unspecified site of breast ([facility])  C50.912 174.9    10. Medicare annual wellness visit, subsequent  Z00.00 V70.0    11.  Encounter for immunization  Z23 V03.89 TETANUS, DIPHTHERIA TOXOIDS AND ACELLULAR PERTUSSIS VACCINE (TDAP), IN INDIVIDS. >=7, IM   12. Advanced directives, counseling/discussion  Z71.89 V65.49 ADVANCE CARE PLANNING FIRST 30 MINS      FULL CODE   13. Screening for depression  Z13.31 V79.0 [facility]     lab results and schedule of future lab studies reviewed with patient  reviewed diet, exercise and weight control  cardiovascular risk and specific lipid/LDL goals reviewed  reviewed medications and side effects in detail  radiology results and schedule of future radiology studies reviewed with patient      I have discussed the diagnosis with the patient and the intended plan of care as seen in the above orders. The patient has received an after-visit summary and questions were answered concerning future plans. I have discussed medication, side effects, and warnings with the patient in detail. The patient verbalized understanding and is in agreement with the plan of care. The patient will contact the office with any additional concerns. Kay Johnson MD    PLEASE NOTE:   This document has been produced using voice recognition software.  Unrecognized errors in transcription may be present

## 2022-04-08 DIAGNOSIS — J40 BRONCHITIS: ICD-10-CM

## 2022-04-08 DIAGNOSIS — R06.2 WHEEZE: ICD-10-CM

## 2022-04-11 RX ORDER — BUDESONIDE AND FORMOTEROL FUMARATE DIHYDRATE 160; 4.5 UG/1; UG/1
AEROSOL RESPIRATORY (INHALATION)
Qty: 30.6 G | Refills: 2 | Status: SHIPPED | OUTPATIENT
Start: 2022-04-11

## 2022-04-12 LAB
MAMMOGRAPHY, EXTERNAL: NEGATIVE
MAMMOGRAPHY, EXTERNAL: NORMAL

## 2022-07-01 DIAGNOSIS — E78.5 DYSLIPIDEMIA: ICD-10-CM

## 2022-07-01 DIAGNOSIS — I10 ESSENTIAL HYPERTENSION: ICD-10-CM

## 2022-07-01 RX ORDER — ATORVASTATIN CALCIUM 80 MG/1
TABLET, FILM COATED ORAL
Qty: 90 TABLET | Refills: 1 | Status: SHIPPED | OUTPATIENT
Start: 2022-07-01

## 2022-07-01 RX ORDER — AMLODIPINE BESYLATE 10 MG/1
TABLET ORAL
Qty: 90 TABLET | Refills: 1 | Status: SHIPPED | OUTPATIENT
Start: 2022-07-01

## 2022-07-28 ENCOUNTER — TELEPHONE (OUTPATIENT)
Dept: PHARMACY | Age: 72
End: 2022-07-28

## 2022-07-28 NOTE — TELEPHONE ENCOUNTER
[facility] CLINICAL PHARMACY: ADHERENCE REVIEW  Identified care gap per [employer]: fills at [facility]: Statin adherence    Last Visit: 4/7/2022    Patient identified as LIS = 2, therefore patient may be able to receive a 90-day supply for the same cost as a 30-day supply      ASSESSMENT    Per OV 4/7/2022 Dr. Kaiser Neigh:  \"HTN: Patient has hypertension. Blood pressure is stable. She denies headache, changes in vision or focal weakness. She is on valsartan and amlodipine. She will continue with these medications. Dyslipidemia: Patient has dyslipidemia. She takes atorvastatin. Stable on the medication. We will continue current treatment plan. \"    Patient has not filled valsartan in 2022. Last filled 10/04/2021 for 90 days- was due 1/1/2022    [id number] Records claims through 7/28/2022(2021 [place] = 83%; YTD PDC =  86%; Potential Fail Date: 9/10/2022 ):   Atorvastatin last filled on 04/02/2022 for 90 day supply. Next refill due: 7/1/2022-PAST DUE 27 days    Per [facility]:   Atorvastatin last picked up on 4/1/2022 for 90 day supply. 0 refills remaining.  Billed through [employer]  A new rx for Atorvastatin 90 + 1 ref was sent in 7/1/2022  Also for Amlodipine  Lab Results   Component Value Date/Time    Cholesterol, total 198 07/29/2021 09:20 AM    HDL Cholesterol 72 (H) 07/29/2021 09:20 AM    LDL, calculated 102.6 (H) 07/29/2021 09:20 AM    VLDL, calculated 23.4 07/29/2021 09:20 AM    Triglyceride 117 07/29/2021 09:20 AM    CHOL/HDL Ratio 2.8 07/29/2021 09:20 AM     ALT (SGPT)   Date Value Ref Range Status   07/29/2021 25 13 - 56 U/L Final     AST (SGOT)   Date Value Ref Range Status   07/29/2021 19 10 - 38 U/L Final     The 10-year ASCVD risk score (Claudette Reese, et al., 2013) is: 26.1%    Values used to calculate the score:      Age: 66 years      Sex: Female      Is Non- : Yes      Diabetic: No      Tobacco smoker: Yes      Systolic Blood Pressure: 555 mmHg Is BP treated: Yes      HDL Cholesterol: 72 MG/DL      Total Cholesterol: 198 MG/DL     PLAN  The following are interventions that have been identified:  - Patient overdue refilling Atorvastatin and active on home medication list  Patient has taking on last OV but has not filled Valsartan this year    [facility] filling Atorvastatin and Amlodipine today 2022  If patient calls back- inquire about valsartan  Letter sent to patient and LM  Future Appointments   Date Time Provider Deidra Mayorga   2023 10:00 AM Chino Robertson MD [facility] BS AMB       Reva Morin, PharmD, [address] [facility], toll free: [phone] Option 2  For Pharmacy [address] [place] [address] in place: No  Recommendation Provided To: Pharmacy: 2  Intervention Detail: Adherence Monitorin  Gap Closed?: No  Intervention Accepted By: Pharmacy: 2  Time Spent (min): 15

## 2022-07-28 NOTE — LETTER
[address]  [address], [address] [place]        Charity Rosas   [address] [facility]  [address] [address] [address]           07/28/22     Dear Charity Rosas,    We tried to reach you recently regarding your Amlodipine and Atorvastatin, but were unable to reach you on the telephone. If you are no longer taking or taking differently, please call us at [phone] Option 2, so that we can discuss this and update your medication profile. These medications are filled and ready for you at: [address] [place] [address], [facility] [address] Phone: [phone]  Please pick this medication up as soon as possible, if you have not already done so. Please also call us back in regards to your Valsartan. This medication has not been refilled since 10/04/2021. We have that you are still taking this medication. It appears that these medicationa have not been filled at proper times. We are worried you might be missing doses or not taking it as directed. It is important that you take your medications regularly and try not to miss a single dose.   Some ways to help you remember to take and refill your medications are to:  · Use a pill box, set an alarm, and/or keep your medication near something that you do every day  · Fill a 3-month supply of your prescription at a time to save you time and trips to the pharmacy - if you would like assistance in switching your prescriptions to a 3-month supply, please contact us [phone] Option 2,  · Ask your pharmacy if they participate in [facility]", a program where you can  all of your medications on the same day  · Ask your pharmacy if you can be set up with automatic refill, where they will automatically refill your prescription when it is due and let you know it's ready to     Sincerely,   Crys Ordonez, PharmD, [address] [facility], toll free: [phone] Option 2

## 2022-11-15 ENCOUNTER — OFFICE VISIT (OUTPATIENT)
Dept: FAMILY MEDICINE CLINIC | Age: 72
End: 2022-11-15
Payer: MEDICARE

## 2022-11-15 VITALS
WEIGHT: 95.4 LBS | HEIGHT: 65 IN | DIASTOLIC BLOOD PRESSURE: 64 MMHG | RESPIRATION RATE: 20 BRPM | SYSTOLIC BLOOD PRESSURE: 135 MMHG | TEMPERATURE: 97.8 F | BODY MASS INDEX: 15.89 KG/M2

## 2022-11-15 DIAGNOSIS — Z85.3 HISTORY OF BREAST CANCER: ICD-10-CM

## 2022-11-15 DIAGNOSIS — I10 ESSENTIAL HYPERTENSION: ICD-10-CM

## 2022-11-15 DIAGNOSIS — R06.2 WHEEZE: ICD-10-CM

## 2022-11-15 DIAGNOSIS — R63.4 ABNORMAL WEIGHT LOSS: ICD-10-CM

## 2022-11-15 DIAGNOSIS — Z90.12 H/O LEFT MASTECTOMY: ICD-10-CM

## 2022-11-15 DIAGNOSIS — R09.81 SINUS CONGESTION: ICD-10-CM

## 2022-11-15 DIAGNOSIS — K59.00 CONSTIPATION, UNSPECIFIED CONSTIPATION TYPE: ICD-10-CM

## 2022-11-15 DIAGNOSIS — R73.9 HYPERGLYCEMIA: ICD-10-CM

## 2022-11-15 DIAGNOSIS — R05.9 COUGH: Primary | ICD-10-CM

## 2022-11-15 DIAGNOSIS — J30.0 VASOMOTOR RHINITIS: ICD-10-CM

## 2022-11-15 DIAGNOSIS — J40 BRONCHITIS: ICD-10-CM

## 2022-11-15 DIAGNOSIS — Z23 ENCOUNTER FOR IMMUNIZATION: ICD-10-CM

## 2022-11-15 DIAGNOSIS — G47.00 INSOMNIA, UNSPECIFIED TYPE: ICD-10-CM

## 2022-11-15 DIAGNOSIS — G89.29 CHRONIC PAIN OF LEFT ANKLE: ICD-10-CM

## 2022-11-15 DIAGNOSIS — M25.572 CHRONIC PAIN OF LEFT ANKLE: ICD-10-CM

## 2022-11-15 DIAGNOSIS — E78.5 DYSLIPIDEMIA: ICD-10-CM

## 2022-11-15 DIAGNOSIS — M79.672 LEFT FOOT PAIN: ICD-10-CM

## 2022-11-15 DIAGNOSIS — F39 MOOD DISORDER (HCC): ICD-10-CM

## 2022-11-15 PROCEDURE — 99215 OFFICE O/P EST HI 40 MIN: CPT | Performed by: FAMILY MEDICINE

## 2022-11-15 PROCEDURE — G8399 PT W/DXA RESULTS DOCUMENT: HCPCS | Performed by: FAMILY MEDICINE

## 2022-11-15 PROCEDURE — G8427 DOCREV CUR MEDS BY ELIG CLIN: HCPCS | Performed by: FAMILY MEDICINE

## 2022-11-15 PROCEDURE — G8510 SCR DEP NEG, NO PLAN REQD: HCPCS | Performed by: FAMILY MEDICINE

## 2022-11-15 PROCEDURE — 1123F ACP DISCUSS/DSCN MKR DOCD: CPT | Performed by: FAMILY MEDICINE

## 2022-11-15 PROCEDURE — 1090F PRES/ABSN URINE INCON ASSESS: CPT | Performed by: FAMILY MEDICINE

## 2022-11-15 PROCEDURE — 3078F DIAST BP <80 MM HG: CPT | Performed by: FAMILY MEDICINE

## 2022-11-15 PROCEDURE — G8419 CALC BMI OUT NRM PARAM NOF/U: HCPCS | Performed by: FAMILY MEDICINE

## 2022-11-15 PROCEDURE — 90694 VACC AIIV4 NO PRSRV 0.5ML IM: CPT | Performed by: FAMILY MEDICINE

## 2022-11-15 PROCEDURE — 1101F PT FALLS ASSESS-DOCD LE1/YR: CPT | Performed by: FAMILY MEDICINE

## 2022-11-15 PROCEDURE — G8536 NO DOC ELDER MAL SCRN: HCPCS | Performed by: FAMILY MEDICINE

## 2022-11-15 PROCEDURE — G8754 DIAS BP LESS 90: HCPCS | Performed by: FAMILY MEDICINE

## 2022-11-15 PROCEDURE — G8752 SYS BP LESS 140: HCPCS | Performed by: FAMILY MEDICINE

## 2022-11-15 PROCEDURE — 3017F COLORECTAL CA SCREEN DOC REV: CPT | Performed by: FAMILY MEDICINE

## 2022-11-15 PROCEDURE — 3074F SYST BP LT 130 MM HG: CPT | Performed by: FAMILY MEDICINE

## 2022-11-15 PROCEDURE — G9899 SCRN MAM PERF RSLTS DOC: HCPCS | Performed by: FAMILY MEDICINE

## 2022-11-15 PROCEDURE — G0008 ADMIN INFLUENZA VIRUS VAC: HCPCS | Performed by: FAMILY MEDICINE

## 2022-11-15 RX ORDER — ALBUTEROL SULFATE 90 UG/1
AEROSOL, METERED RESPIRATORY (INHALATION)
Qty: 8.5 G | Refills: 2 | Status: SHIPPED | OUTPATIENT
Start: 2022-11-15

## 2022-11-15 RX ORDER — FLUTICASONE PROPIONATE 50 MCG
2 SPRAY, SUSPENSION (ML) NASAL DAILY
Qty: 48 G | Refills: 1 | Status: SHIPPED | OUTPATIENT
Start: 2022-11-15

## 2022-11-15 RX ORDER — PROMETHAZINE HYDROCHLORIDE AND DEXTROMETHORPHAN HYDROBROMIDE 6.25; 15 MG/5ML; MG/5ML
5 SYRUP ORAL
Qty: 240 ML | Refills: 0 | Status: SHIPPED | OUTPATIENT
Start: 2022-11-15

## 2022-11-15 RX ORDER — DOXYCYCLINE 100 MG/1
100 TABLET ORAL 2 TIMES DAILY
Qty: 20 TABLET | Refills: 0 | Status: SHIPPED | OUTPATIENT
Start: 2022-11-15 | End: 2022-11-25

## 2022-11-15 RX ORDER — MIRTAZAPINE 7.5 MG/1
7.5 TABLET, FILM COATED ORAL
Qty: 90 TABLET | Refills: 1 | Status: SHIPPED | OUTPATIENT
Start: 2022-11-15

## 2022-11-15 RX ORDER — BUDESONIDE AND FORMOTEROL FUMARATE DIHYDRATE 80; 4.5 UG/1; UG/1
2 AEROSOL RESPIRATORY (INHALATION) 2 TIMES DAILY
Qty: 1 EACH | Refills: 2 | Status: SHIPPED | OUTPATIENT
Start: 2022-11-15

## 2022-11-15 NOTE — PROGRESS NOTES
After obtaining consent, and per orders of Dr. Christina Salmon, injection of [facility] was given by Ubaldo Nuñez. Patient instructed to remain in clinic for 20 minutes afterwards, and to report any adverse reaction to me immediately.

## 2022-11-15 NOTE — PROGRESS NOTES
1. \"Have you been to the ER, urgent care clinic since your last visit? Hospitalized since your last visit? \" No    2. \"Have you seen or consulted any other health care providers outside of the [address] [place] [address] since your last visit? \" No     3. For patients aged 39-70: Has the patient had a colonoscopy / FIT/ Cologuard? Yes - no Care Gap present      If the patient is female:    4. For patients aged 41-77: Has the patient had a mammogram within the past 2 years? No      5. For patients aged 21-65: Has the patient had a pap smear?  NA - based on age or sex

## 2022-11-15 NOTE — PROGRESS NOTES
YESENIA Valentin comes in for follow up care. Cough: Patient has a chronic cough. She has had a cough for more than a month. Cough is productive of purulent phlegm. She has malaise and fatigue. Denies fever or chills. Denies hemoptysis. Denies diaphoresis but she has pleuritic chest pain. She denies night sweats. Has taken over-the-counter medication without much relief. I will send in doxycycline and Promethazine DM to take for the cough. Patient will have a chest x-ray done. I will follow-up with the results. Weight loss: Patient has had profound weight loss over the past 3 months. She has lost more than 20 pounds since I last saw her here in the clinic. I will order a chest x-ray to evaluate for any abnormality. Will benefit from evaluation of malignancy. She has a previous history of breast cancer and has had left mastectomy done. I will order labs. Breast cancer: Patient has left breast cancer. Has had left mastectomy done. She had mammogram right breast done earlier this year that was normal.  Constipation: Patient has constipation with abdominal discomfort. She is due for colonoscopy next year. Given the profound weight loss and the constipation I will refer her to see the gastroenterologist.  May benefit from getting a colonoscopy done. She denies blood in the stool. Denies dark stools, diarrhea, nausea, vomiting or abdominal distention. HTN: Patient has hypertension. Blood pressure is stable. She is on amlodipine and valsartan. We will continue with these medications. I will send in a refill. Dyslipidemia: Patient has dyslipidemia. She is on atorvastatin. Stable on the medication. I will check lipid panel. Insomnia: Patient has insomnia. She has been on mirtazapine and this helps with sleep. Also helps with her appetite. I will send in a refill of the same. Mood disorder: Patient is depressed. She denies any suicidal ideation. We discussed supportive care measures. I will send in Remeron to take. Wheeze: Patient has wheeze that comes on and off. She has occasional shortness of breath and a cough. She is on albuterol inhaler and Symbicort. I will send in a refill of medication. 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 She has trouble when her vehicle jimenez or about distance and would like a disability parking placard. Paperwork was filled out. Lower extremity pain: Patient has pain left foot and left ankle. She has been seen by the podiatrist in the past.  States that they did not do much. She would prefer to see a different specialist.  She denies any swelling of the foot or ankle. Pain comes on with application and the walking for long distances. She states that most of the pain is anterior aspect of the ball of the foot. It is also in her ankle. We will order x-rays of the foot and the ankle. I will refer her to the specialist.  We discussed osteoarthritis. She can take Tylenol arthritis for pain as needed. Sinus congestion: Patient has sinus congestion, nasal congestion and sneezing. He has used Flonase. Would like a refill of the medication. Prescription is sent in.       Past Medical History  Past Medical History:   Diagnosis Date    Arthritis     Blind left eye     Breast cancer ([facility] [address]     Glaucoma     High cholesterol     Hip pain     Hypertension        Surgical History  Past Surgical History:   Procedure Laterality Date    COLONOSCOPY N/A 5/9/2018    COLONOSCOPY / polypectomy performed by Darian Elaine MD at [facility] ENDOSCOPY    HX COLONOSCOPY  2008    hx of polyps    HX MASTECTOMY Left     VASCULAR SURGERY PROCEDURE UNLIST Bilateral     vein cleaning to help with nerves        Medications  Current Outpatient Medications   Medication Sig Dispense Refill    amLODIPine (NORVASC) 10 mg tablet TAKE 1 TABLET BY MOUTH DAILY 90 Tablet 1    atorvastatin (LIPITOR) 80 mg tablet TAKE 1 TABLET BY MOUTH DAILY 90 Tablet 1    valsartan (DIOVAN) 160 mg tablet TAKE 1 TABLET BY MOUTH DAILY 90 Tablet 1    albuterol (PROVENTIL HFA, VENTOLIN HFA, PROAIR HFA) 90 mcg/actuation inhaler INHALE 2 PUFFS BY MOUTH EVERY 6 HOURS AS NEEDED FOR WHEEZING 8.5 g 2    cyclobenzaprine (FLEXERIL) 10 mg tablet Take 1 Tab by mouth three (3) times daily as needed for Muscle Spasm(s). 60 Tab 3    aspirin delayed-release 81 mg tablet Take  by mouth daily. COMBIGAN 0.2-0.5 % drop ophthalmic solution INSTILL 1 DROP DAILY IN RIGHT EYE  3    budesonide-formoteroL (SYMBICORT) 160-4.5 mcg/actuation HFAA INHALE 2 PUFFS BY MOUTH TWICE DAILY (Patient not taking: Reported on 11/15/2022) 30.6 g 2    mirtazapine (REMERON) 7.5 mg tablet Take 1 Tablet by mouth nightly. 30 Tablet 2    pregabalin (LYRICA) 50 mg capsule TAKE 1 CAPSULE BY MOUTH TWICE DAILY. MAX DAILY AMOUNT: 100 MG (Patient not taking: Reported on 11/15/2022) 60 Capsule 2    fluticasone propionate (FLONASE) 50 mcg/actuation nasal spray SHAKE LIQUID AND USE 2 SPRAYS IN EACH NOSTRIL DAILY (Patient not taking: Reported on 11/15/2022) 48 g 1       Allergies  No Known Allergies    Family History  Family History   Problem Relation Age of Onset    Diabetes Mother     Diabetes Father     Colon Polyps Sister        Social History  Social History     Socioeconomic History    Marital status:      Spouse name: Not on file    Number of children: Not on file    Years of education: Not on file    Highest education level: Not on file   Occupational History    Occupation: Retired   Tobacco Use    Smoking status: Light Smoker     Types: Cigarettes    Smokeless tobacco: Never   Vaping Use    Vaping Use: Never used   Substance and Sexual Activity    Alcohol use:  Yes     Alcohol/week: 1.0 standard drink     Types: 1 Cans of beer per week     Comment: occ    Drug use: Not Currently     Types: Prescription    Sexual activity: Never   Other Topics Concern     Service No    Blood Transfusions No    Caffeine Concern No    Occupational Exposure No Hobby Hazards No    Sleep Concern No    Stress Concern No    Weight Concern No    Special Diet No    Back Care No    Exercise No    Bike Helmet No    Seat Belt Yes    Self-Exams Yes   Social History Narrative    Not on file     Social Determinants of Health     Financial Resource Strain: Not on file   Food Insecurity: Not on file   Transportation Needs: Not on file   Physical Activity: Not on file   Stress: Not on file   Social Connections: Not on file   Intimate Partner Violence: Not on file   Housing Stability: Not on file     Review of Systems  Review of Systems - Review of all systems is negative except as noted above in the HPI. Vital Signs  Visit Vitals  /64   Temp 97.8 °F (36.6 °C)   Resp 20   Ht 5' 5\" (1.651 m)   Wt 95 lb 6.4 oz (43.3 kg)   BMI 15.88 kg/m²         Physical Exam  Physical Examination: General appearance - chronically ill appearing  Mental status - affect appropriate to mood  Neck - supple, no significant adenopathy  Lymphatics - no palpable lymphadenopathy  Chest - no tachypnea, retractions or cyanosis, decreased air entry noted bilateral lung bases  Heart - systolic murmur 2/6 at lower left sternal border  Abdomen - no rebound tenderness noted  Back exam - limited range of motion  Neurological - abnormal neurological exam unchanged from prior examinations  Musculoskeletal - osteoarthritic changes noted in both hands, left foot with discomfort to palpation plantar aspect at the ball of the foot. Left ankle with discomfort to palpation ankle margins, dorsi and plantar flexion. No swelling or erythema.   Extremities - intact peripheral pulses        Results  Results for orders placed or performed during the hospital encounter of 07/29/21   CBC WITH AUTOMATED DIFF   Result Value Ref Range    WBC 5.1 4.6 - 13.2 K/uL    RBC 3.99 (L) 4.20 - 5.30 M/uL    HGB 11.5 (L) 12.0 - 16.0 g/dL    HCT 35.4 35.0 - 45.0 %    MCV 88.7 74.0 - 97.0 FL    MCH 28.8 24.0 - 34.0 PG    MCHC 32.5 31.0 - 37.0 g/dL    RDW 12.9 11.6 - 14.5 %    PLATELET 761 (H) 902 - 420 K/uL    MPV 9.2 9.2 - 11.8 FL    NEUTROPHILS 48 40 - 73 %    LYMPHOCYTES 40 21 - 52 %    MONOCYTES 9 3 - 10 %    EOSINOPHILS 2 0 - 5 %    BASOPHILS 0 0 - 2 %    ABS. NEUTROPHILS 2.5 1.8 - 8.0 K/UL    ABS. LYMPHOCYTES 2.0 0.9 - 3.6 K/UL    ABS. MONOCYTES 0.5 0.05 - 1.2 K/UL    ABS. EOSINOPHILS 0.1 0.0 - 0.4 K/UL    ABS. BASOPHILS 0.0 0.0 - 0.1 K/UL    DF AUTOMATED     LIPID PANEL   Result Value Ref Range    LIPID PROFILE          Cholesterol, total 198 <200 MG/DL    Triglyceride 117 <150 MG/DL    HDL Cholesterol 72 (H) 40 - 60 MG/DL    LDL, calculated 102.6 (H) 0 - 100 MG/DL    VLDL, calculated 23.4 MG/DL    CHOL/HDL Ratio 2.8 0 - 5.0     METABOLIC PANEL, COMPREHENSIVE   Result Value Ref Range    Sodium 142 136 - 145 mmol/L    Potassium 3.7 3.5 - 5.5 mmol/L    Chloride 111 100 - 111 mmol/L    CO2 25 21 - 32 mmol/L    Anion gap 6 3.0 - 18 mmol/L    Glucose 125 (H) 74 - 99 mg/dL    BUN 13 7.0 - 18 MG/DL    Creatinine 0.71 0.6 - 1.3 MG/DL    BUN/Creatinine ratio 18 12 - 20      GFR est AA >60 >60 ml/min/1.73m2    GFR est non-AA >60 >60 ml/min/1.73m2    Calcium 9.8 8.5 - 10.1 MG/DL    Bilirubin, total 0.9 0.2 - 1.0 MG/DL    ALT (SGPT) 25 13 - 56 U/L    AST (SGOT) 19 10 - 38 U/L    Alk. phosphatase 85 45 - 117 U/L    Protein, total 7.2 6.4 - 8.2 g/dL    Albumin 4.0 3.4 - 5.0 g/dL    Globulin 3.2 2.0 - 4.0 g/dL    A-G Ratio 1.3 0.8 - 1.7         ASSESSMENT and PLAN    ICD-10-CM ICD-9-CM    1. Cough  R05.9 786.2 albuterol (PROVENTIL HFA, VENTOLIN HFA, PROAIR HFA) 90 mcg/actuation inhaler      XR CHEST PA LAT      promethazine-dextromethorphan (PROMETHAZINE-DM) 6.25-15 mg/5 mL syrup      doxycycline (ADOXA) 100 mg tablet      2. Vasomotor rhinitis  J30.0 477.9 fluticasone propionate (FLONASE) 50 mcg/actuation nasal spray      3. Sinus congestion  R09.81 478.19 fluticasone propionate (FLONASE) 50 mcg/actuation nasal spray      4.  Mood disorder ([facility]  F39 296.90 mirtazapine (REMERON) 7.5 mg tablet      5. History of breast cancer  Z85.3 V10.3       6. H/O left mastectomy  Z90.12 V45.71       7. Dyslipidemia  E78.5 272.4 LIPID PANEL      8. Essential hypertension  B73 831.0 METABOLIC PANEL, COMPREHENSIVE      CBC WITH AUTOMATED DIFF      9. Abnormal weight loss  R63.4 783.21 REFERRAL TO GASTROENTEROLOGY      10. Insomnia, unspecified type  G47.00 780.52       11. Hyperglycemia  R73.9 790.29 HEMOGLOBIN A1C WITH EAG      12. Encounter for immunization  Z23 V03.89 INFLUENZA, FLUAD, (AGE 72 Y+), IM, PF, 0.5 ML      NM IMMUNIZ ADMIN,1 SINGLE/COMB VAC/TOXOID      13. Bronchitis  J40 490 budesonide-formoteroL (Symbicort) 80-4.5 mcg/actuation HFAA      14. Wheeze  R06.2 786.07 budesonide-formoteroL (Symbicort) 80-4.5 mcg/actuation HFAA      15. Constipation, unspecified constipation type  K59.00 564.00 REFERRAL TO GASTROENTEROLOGY      16. Chronic pain of left ankle  M25.572 719.47 XR ANKLE LT MIN 3 V    G89.29 338.29 REFERRAL TO ORTHOPEDICS      17. Left foot pain  M79.672 729.5 XR FOOT LT MIN 3 V      REFERRAL TO ORTHOPEDICS        lab results and schedule of future lab studies reviewed with patient  reviewed diet, exercise and weight control  cardiovascular risk and specific lipid/LDL goals reviewed  reviewed medications and side effects in detail  radiology results and schedule of future radiology studies reviewed with patient      I have discussed the diagnosis with the patient and the intended plan of care as seen in the above orders. The patient has received an after-visit summary and questions were answered concerning future plans. I have discussed medication, side effects, and warnings with the patient in detail. The patient verbalized understanding and is in agreement with the plan of care. The patient will contact the office with any additional concerns. I spent at least 50 minutes on this visit with this established patient.     Bobo Cui MD    PLEASE NOTE: This document has been produced using voice recognition software.  Unrecognized errors in transcription may be present

## 2023-05-01 ENCOUNTER — OFFICE VISIT (OUTPATIENT)
Facility: CLINIC | Age: 73
End: 2023-05-01

## 2023-05-01 VITALS
DIASTOLIC BLOOD PRESSURE: 77 MMHG | TEMPERATURE: 97.4 F | WEIGHT: 95 LBS | SYSTOLIC BLOOD PRESSURE: 161 MMHG | OXYGEN SATURATION: 99 % | HEIGHT: 64 IN | BODY MASS INDEX: 16.22 KG/M2 | RESPIRATION RATE: 20 BRPM | HEART RATE: 64 BPM

## 2023-05-01 DIAGNOSIS — G89.29 CHRONIC PAIN IN LEFT FOOT: ICD-10-CM

## 2023-05-01 DIAGNOSIS — M62.838 MUSCLE SPASM: ICD-10-CM

## 2023-05-01 DIAGNOSIS — Z00.00 MEDICARE ANNUAL WELLNESS VISIT, SUBSEQUENT: Primary | ICD-10-CM

## 2023-05-01 DIAGNOSIS — R06.2 WHEEZING: ICD-10-CM

## 2023-05-01 DIAGNOSIS — M79.672 CHRONIC PAIN IN LEFT FOOT: ICD-10-CM

## 2023-05-01 DIAGNOSIS — Z12.11 SCREEN FOR COLON CANCER: ICD-10-CM

## 2023-05-01 DIAGNOSIS — F39 UNSPECIFIED MOOD (AFFECTIVE) DISORDER (HCC): ICD-10-CM

## 2023-05-01 DIAGNOSIS — I10 ESSENTIAL (PRIMARY) HYPERTENSION: ICD-10-CM

## 2023-05-01 DIAGNOSIS — M20.42 HAMMER TOE OF LEFT FOOT: ICD-10-CM

## 2023-05-01 DIAGNOSIS — E78.5 HYPERLIPIDEMIA, UNSPECIFIED HYPERLIPIDEMIA TYPE: ICD-10-CM

## 2023-05-01 DIAGNOSIS — Z12.31 SCREENING MAMMOGRAM FOR BREAST CANCER: ICD-10-CM

## 2023-05-01 DIAGNOSIS — R73.9 HYPERGLYCEMIA: ICD-10-CM

## 2023-05-01 DIAGNOSIS — C50.912 MALIGNANT NEOPLASM OF LEFT FEMALE BREAST, UNSPECIFIED ESTROGEN RECEPTOR STATUS, UNSPECIFIED SITE OF BREAST (HCC): ICD-10-CM

## 2023-05-01 RX ORDER — BUDESONIDE AND FORMOTEROL FUMARATE DIHYDRATE 80; 4.5 UG/1; UG/1
2 AEROSOL RESPIRATORY (INHALATION) 2 TIMES DAILY
Qty: 10.2 G | Refills: 1 | Status: SHIPPED | OUTPATIENT
Start: 2023-05-01

## 2023-05-01 RX ORDER — AMLODIPINE BESYLATE 10 MG/1
10 TABLET ORAL DAILY
Qty: 90 TABLET | Refills: 1 | Status: SHIPPED | OUTPATIENT
Start: 2023-05-01

## 2023-05-01 RX ORDER — VALSARTAN 160 MG/1
160 TABLET ORAL DAILY
Qty: 90 TABLET | Refills: 1 | Status: SHIPPED | OUTPATIENT
Start: 2023-05-01

## 2023-05-01 RX ORDER — CYCLOBENZAPRINE HCL 10 MG
10 TABLET ORAL 3 TIMES DAILY PRN
Qty: 90 TABLET | Refills: 1 | Status: SHIPPED | OUTPATIENT
Start: 2023-05-01

## 2023-05-01 RX ORDER — ALBUTEROL SULFATE 90 UG/1
AEROSOL, METERED RESPIRATORY (INHALATION)
Qty: 18 G | Refills: 2 | Status: SHIPPED | OUTPATIENT
Start: 2023-05-01

## 2023-05-01 RX ORDER — ASPIRIN 81 MG/1
81 TABLET ORAL DAILY
Qty: 90 TABLET | Refills: 1 | Status: SHIPPED | OUTPATIENT
Start: 2023-05-01

## 2023-05-01 RX ORDER — ATORVASTATIN CALCIUM 80 MG/1
80 TABLET, FILM COATED ORAL DAILY
Qty: 90 TABLET | Refills: 1 | Status: SHIPPED | OUTPATIENT
Start: 2023-05-01

## 2023-05-01 SDOH — ECONOMIC STABILITY: INCOME INSECURITY: HOW HARD IS IT FOR YOU TO PAY FOR THE VERY BASICS LIKE FOOD, HOUSING, MEDICAL CARE, AND HEATING?: NOT VERY HARD

## 2023-05-01 SDOH — ECONOMIC STABILITY: HOUSING INSECURITY
IN THE LAST 12 MONTHS, WAS THERE A TIME WHEN YOU DID NOT HAVE A STEADY PLACE TO SLEEP OR SLEPT IN A SHELTER (INCLUDING NOW)?: NO

## 2023-05-01 SDOH — ECONOMIC STABILITY: FOOD INSECURITY: WITHIN THE PAST 12 MONTHS, THE FOOD YOU BOUGHT JUST DIDN'T LAST AND YOU DIDN'T HAVE MONEY TO GET MORE.: NEVER TRUE

## 2023-05-01 SDOH — ECONOMIC STABILITY: FOOD INSECURITY: WITHIN THE PAST 12 MONTHS, YOU WORRIED THAT YOUR FOOD WOULD RUN OUT BEFORE YOU GOT MONEY TO BUY MORE.: NEVER TRUE

## 2023-05-01 ASSESSMENT — PATIENT HEALTH QUESTIONNAIRE - PHQ9
1. LITTLE INTEREST OR PLEASURE IN DOING THINGS: 0
SUM OF ALL RESPONSES TO PHQ QUESTIONS 1-9: 0
SUM OF ALL RESPONSES TO PHQ9 QUESTIONS 1 & 2: 0
2. FEELING DOWN, DEPRESSED OR HOPELESS: 0
SUM OF ALL RESPONSES TO PHQ QUESTIONS 1-9: 0

## 2023-05-01 ASSESSMENT — LIFESTYLE VARIABLES
HOW MANY STANDARD DRINKS CONTAINING ALCOHOL DO YOU HAVE ON A TYPICAL DAY: 1 OR 2
HOW OFTEN DO YOU HAVE A DRINK CONTAINING ALCOHOL: 2-4 TIMES A MONTH

## 2023-05-01 NOTE — PROGRESS NOTES
1. \"Have you been to the ER, urgent care clinic since your last visit? Hospitalized since your last visit? \"No    2. \"Have you seen or consulted any other health care providers outside of the [address] [place] [address] since your last visit? \" No    3. For patients aged 39-70: Has the patient had a colonoscopy / FIT/ Cologuard? No      If the patient is female:    4. For patients aged 41-77: Has the patient had a mammogram within the past 2 years? Yes      5. For patients aged 21-65: Has the patient had a pap smear?  No

## 2023-05-01 NOTE — PROGRESS NOTES
HPI  Kailyn Aranda comes in follow-up care. Hypertension: Patient has hypertension. Blood pressure is elevated today. Patient has not been taking her medication as prescribed. I discussed importance of taking medications and being compliant with the treatment plan. She is on losartan and amlodipine. Refill of medication was sent in. She will take a low-sodium diet. She will keep a blood pressure log and we will follow-up in 2 weeks. Foot pain: Patient has chronic pain left foot. This is at the ball of the foot. Pain is worse when she walks. She has been seen by the podiatrist in the past without much relief. X-rays have shown degenerative changes. Patient has hammertoe family that is getting worse left foot. I will refer her to see the foot and ankle specialist.  We will follow-up with the recommendations. Discussed supportive care measures for now. Wheezing: Patient is audible wheezing especially now that there is a lot of pollen in the environment. She has used inhaler medication in the past for wheezing. She continues to smoke. She has been advised to get but she is not ready yet. I will send in albuterol inhaler. I will also send in Symbicort to use twice a day. She denies chest pain, fever, chills or hemoptysis. Dyslipidemia: Patient has dyslipidemia. She is on atorvastatin. She will exercise and take a diet low in polysaturated fats. I will send in a refill of medication. Recheck lipid panel today. Hyperglycemia: We will check HbA1c today. Mood disorder: Patient has mood disorder with anxiety and depression. Previously he was mirtazapine. Currently patient states that she is doing conservative measures to help with her mood. Her son had a stroke and her  is also unwell given chronic medical illnesses including CHF and stroke. These have stressed the patient out. We discussed supportive care measures. She does not want any medication for anxiety or depression.   Muscle

## 2023-05-07 ASSESSMENT — PATIENT HEALTH QUESTIONNAIRE - PHQ9
2. FEELING DOWN, DEPRESSED OR HOPELESS: 0
SUM OF ALL RESPONSES TO PHQ9 QUESTIONS 1 & 2: 0
SUM OF ALL RESPONSES TO PHQ QUESTIONS 1-9: 0
1. LITTLE INTEREST OR PLEASURE IN DOING THINGS: 0
SUM OF ALL RESPONSES TO PHQ QUESTIONS 1-9: 0

## 2023-12-04 ENCOUNTER — COMMUNITY OUTREACH (OUTPATIENT)
Facility: CLINIC | Age: 73
End: 2023-12-04

## 2023-12-20 PROBLEM — J44.9 CHRONIC OBSTRUCTIVE PULMONARY DISEASE, UNSPECIFIED (HCC): Status: ACTIVE | Noted: 2023-12-20

## 2024-01-10 NOTE — PROGRESS NOTES
Henrietta Coley  1950   Chief Complaint   Patient presents with    Shoulder Pain     RT        HISTORY OF PRESENT ILLNESS  Henrietta Coley is a 73 y.o. female who presents today for evaluation of right shoulder pain. Patient is referred by Arun Mitchell MD. Office note will be sent to referring provider. Pain is a 8/10. Pain has been present for a little while. She notes a constant pain near her shoulder blade. She denies pain when moving her arm.  Stabbing pain that comes shoulder blade radiates down her arm    Has tried following treatments: Injections:No; Brace:No; Therapy:No; Cane/Crutch:No      No Known Allergies     Past Medical History:   Diagnosis Date    Arthritis     Blind left eye     Breast cancer ([facility])     Glaucoma     High cholesterol     Hip pain     Hypertension       Social History       Tobacco History       Smoking Status  Light Smoker      Smokeless Tobacco Use  Never              Alcohol History       Alcohol Use Status  Yes Amount  1.0 standard drink of alcohol/wk              Drug Use       Drug Use Status  Not Currently Types  Prescription              Sexual Activity       Sexually Active  Not Currently                   Past Surgical History:   Procedure Laterality Date    COLONOSCOPY  2008    hx of polyps    COLONOSCOPY N/A 5/9/2018    COLONOSCOPY / polypectomy performed by Eber Galicia MD at [facility] ENDOSCOPY    MASTECTOMY Left     VASCULAR SURGERY Bilateral     vein cleaning to help with nerves      Family History   Problem Relation Age of Onset    Diabetes Mother     Diabetes Father     Colon Polyps Sister      Current Outpatient Medications   Medication Sig    amLODIPine (NORVASC) 10 MG tablet Take 1 tablet by mouth daily    albuterol sulfate HFA (PROVENTIL;VENTOLIN;PROAIR) 108 (90 Base) MCG/ACT inhaler INHALE 2 PUFFS BY MOUTH EVERY 6 HOURS AS NEEDED FOR WHEEZING    atorvastatin (LIPITOR) 80 MG tablet Take 1 tablet by mouth daily    aspirin 81 MG EC tablet Take 1

## 2024-01-11 ENCOUNTER — OFFICE VISIT (OUTPATIENT)
Age: 74
End: 2024-01-11
Payer: MEDICAID

## 2024-01-11 VITALS — HEIGHT: 65 IN | BODY MASS INDEX: 17.56 KG/M2 | WEIGHT: 105.4 LBS

## 2024-01-11 DIAGNOSIS — M54.12 CERVICAL RADICULOPATHY: ICD-10-CM

## 2024-01-11 DIAGNOSIS — M25.511 ACUTE PAIN OF RIGHT SHOULDER: Primary | ICD-10-CM

## 2024-01-11 PROCEDURE — 73030 X-RAY EXAM OF SHOULDER: CPT | Performed by: ORTHOPAEDIC SURGERY

## 2024-01-11 PROCEDURE — 1123F ACP DISCUSS/DSCN MKR DOCD: CPT | Performed by: ORTHOPAEDIC SURGERY

## 2024-01-11 PROCEDURE — 99203 OFFICE O/P NEW LOW 30 MIN: CPT | Performed by: ORTHOPAEDIC SURGERY

## 2024-01-11 RX ORDER — BACLOFEN 10 MG/1
10 TABLET ORAL 3 TIMES DAILY
Qty: 90 TABLET | Refills: 0 | Status: SHIPPED | OUTPATIENT
Start: 2024-01-11

## 2024-01-11 RX ORDER — MELOXICAM 15 MG/1
15 TABLET ORAL DAILY
Qty: 30 TABLET | Refills: 3 | Status: SHIPPED | OUTPATIENT
Start: 2024-01-11

## 2024-01-24 LAB
A/G RATIO: 1.6 RATIO (ref 1.1–2.6)
ALBUMIN SERPL-MCNC: 4.4 G/DL (ref 3.5–5)
ALP BLD-CCNC: 76 U/L (ref 40–120)
ALT SERPL-CCNC: 10 U/L (ref 5–40)
ANION GAP SERPL CALCULATED.3IONS-SCNC: 8 MMOL/L (ref 3–15)
AST SERPL-CCNC: 16 U/L (ref 10–37)
BASOPHILS # BLD: 1 % (ref 0–2)
BASOPHILS ABSOLUTE: 0 K/UL (ref 0–0.2)
BILIRUB SERPL-MCNC: 0.7 MG/DL (ref 0.2–1.2)
BUN BLDV-MCNC: 10 MG/DL (ref 6–22)
CALCIUM SERPL-MCNC: 9.8 MG/DL (ref 8.4–10.5)
CHLORIDE BLD-SCNC: 106 MMOL/L (ref 98–110)
CHOLESTEROL/HDL RATIO: 3 (ref 0–5)
CHOLESTEROL: 273 MG/DL (ref 110–200)
CO2: 27 MMOL/L (ref 20–32)
CREAT SERPL-MCNC: 0.6 MG/DL (ref 0.8–1.4)
EOSINOPHIL # BLD: 1 % (ref 0–6)
EOSINOPHILS ABSOLUTE: 0.1 K/UL (ref 0–0.5)
ESTIMATED AVERAGE GLUCOSE: 101 MG/DL (ref 91–123)
GLOBULIN: 2.7 G/DL (ref 2–4)
GLOMERULAR FILTRATION RATE: >60 ML/MIN/1.73 SQ.M.
GLUCOSE: 98 MG/DL (ref 70–99)
HBA1C MFR BLD: 5.1 % (ref 4.8–5.6)
HCT VFR BLD CALC: 35.7 % (ref 35.1–48.3)
HDLC SERPL-MCNC: 90 MG/DL
HEMOGLOBIN: 11.9 G/DL (ref 11.7–16.1)
LDL CHOLESTEROL CALCULATED: 165 MG/DL (ref 50–99)
LDL/HDL RATIO: 1.8
LYMPHOCYTES # BLD: 30 % (ref 20–45)
LYMPHOCYTES ABSOLUTE: 2.1 K/UL (ref 1–4.8)
MCH RBC QN AUTO: 29 PG (ref 26–34)
MCHC RBC AUTO-ENTMCNC: 33 G/DL (ref 31–36)
MCV RBC AUTO: 88 FL (ref 80–99)
MONOCYTES ABSOLUTE: 0.4 K/UL (ref 0.1–1)
MONOCYTES: 5 % (ref 3–12)
NEUTROPHILS ABSOLUTE: 4.2 K/UL (ref 1.8–7.7)
NEUTROPHILS: 62 % (ref 40–75)
NON-HDL CHOLESTEROL: 183 MG/DL
PDW BLD-RTO: 13.2 % (ref 10–15.5)
PLATELET # BLD: 403 K/UL (ref 140–440)
PMV BLD AUTO: 9 FL (ref 9–13)
POTASSIUM SERPL-SCNC: 4.1 MMOL/L (ref 3.5–5.5)
RBC: 4.05 M/UL (ref 3.8–5.2)
SODIUM BLD-SCNC: 141 MMOL/L (ref 133–145)
TOTAL PROTEIN: 7.1 G/DL (ref 6.2–8.1)
TRIGL SERPL-MCNC: 87 MG/DL (ref 40–149)
VLDLC SERPL CALC-MCNC: 17 MG/DL (ref 8–30)
WBC: 6.8 K/UL (ref 4–11)

## 2024-04-03 NOTE — PROGRESS NOTES
[facility]  [address], [address]  [place] [address]  Phone: [phone]  Fax: [phone]     NEW PATIENT  Pt's YOB: 1950    ASSESSMENT   Henrietta was seen today for new patient and neck pain.    Diagnoses and all orders for this visit:    Sacral pain  -     MRI PELVIS WO CONTRAST; Future    Left foot pain  -     diclofenac sodium (VOLTAREN) 1 % GEL; Apply 4 g topically 4 times daily Apply to left foot qid as needed for pain    Upper back pain    Cervical radiculopathy    Cervical pain    History of fall    Legally blind         IMPRESSION AND PLAN:  Henrietta Coley is a 73 y.o. RHD female with history of cervical pain and right shoulder pain and presents to the office today as a new patient. She has had two falls; one about 10 months ago and another 2 weeks ago. She denies any radicular symptoms to BUE, but endorses radicular symptoms to RLE. She denies any arm or leg weakness. She denies any balance issues. She reports she has glaucoma in her right eye and blindness of the left eye. She denies having a pacemaker or defibrillator. She is taking Baclofen 10 mg TID, Mobic 15 mg QD, Amlodipine 10 mg QD, Atorvastatin 80 mg, Aspirin 81 mg, and Valsartan 160 mg. She was prescribed Tramadol, but states that it makes her feel sick. She is not on a blood thinner.    Ms. Coley has a reminder for a \"due or due soon\" health maintenance. I have asked that she contact her primary care provider, Arun Mitchell MD, for follow-up on this health maintenance.   demonstrated consistency with prescribing.   Discussed treatment options with the patient including medications, trigger point injections, and an MRI.   Pelvis MRI was ordered to assess for sacral fracture (acute)  Patient was prescribed Voltaren 1% gel for joint pain  Return in about 5 weeks (around 5/9/2024) for Diagnostic follow up.      HISTORY OF PRESENT ILLNESS:  Henrietta Coley is a 73 y.o. right hand

## 2024-04-04 ENCOUNTER — OFFICE VISIT (OUTPATIENT)
Age: 74
End: 2024-04-04
Payer: MEDICAID

## 2024-04-04 VITALS
HEIGHT: 65 IN | BODY MASS INDEX: 16.66 KG/M2 | TEMPERATURE: 97.5 F | HEART RATE: 75 BPM | OXYGEN SATURATION: 98 % | WEIGHT: 100 LBS

## 2024-04-04 DIAGNOSIS — Z91.81 HISTORY OF FALL: ICD-10-CM

## 2024-04-04 DIAGNOSIS — M54.2 CERVICAL PAIN: ICD-10-CM

## 2024-04-04 DIAGNOSIS — M79.672 LEFT FOOT PAIN: ICD-10-CM

## 2024-04-04 DIAGNOSIS — M54.12 CERVICAL RADICULOPATHY: ICD-10-CM

## 2024-04-04 DIAGNOSIS — M54.9 UPPER BACK PAIN: ICD-10-CM

## 2024-04-04 DIAGNOSIS — H54.8 LEGALLY BLIND: ICD-10-CM

## 2024-04-04 DIAGNOSIS — M53.3 SACRAL PAIN: Primary | ICD-10-CM

## 2024-04-04 PROCEDURE — 1123F ACP DISCUSS/DSCN MKR DOCD: CPT | Performed by: PHYSICAL MEDICINE & REHABILITATION

## 2024-04-04 PROCEDURE — 99203 OFFICE O/P NEW LOW 30 MIN: CPT | Performed by: PHYSICAL MEDICINE & REHABILITATION

## 2024-04-04 RX ORDER — PREDNISONE 20 MG/1
60 TABLET ORAL DAILY
COMMUNITY
Start: 2024-04-02 | End: 2024-04-07

## 2024-04-04 RX ORDER — TRAMADOL HYDROCHLORIDE 50 MG/1
50 TABLET ORAL EVERY 6 HOURS PRN
COMMUNITY
Start: 2024-04-02

## 2024-04-16 ENCOUNTER — TELEPHONE (OUTPATIENT)
Age: 74
End: 2024-04-16

## 2024-04-16 NOTE — TELEPHONE ENCOUNTER
Patient is requesting for Dr. Padilla to fax her MRI order to [facility] fax number [phone]    Patient tel [phone]

## 2024-04-17 DIAGNOSIS — M53.3 SACRAL PAIN: ICD-10-CM

## 2024-04-17 NOTE — TELEPHONE ENCOUNTER
Records faxed to [facility] to authorize and schedule. See referral and media for details. Thank you.

## 2024-05-30 ENCOUNTER — OFFICE VISIT (OUTPATIENT)
Facility: CLINIC | Age: 74
End: 2024-05-30

## 2024-05-30 DIAGNOSIS — G47.00 INSOMNIA, UNSPECIFIED TYPE: ICD-10-CM

## 2024-05-30 DIAGNOSIS — S32.2XXA CLOSED FRACTURE OF SACRUM AND COCCYX, INITIAL ENCOUNTER (HCC): ICD-10-CM

## 2024-05-30 DIAGNOSIS — I10 ESSENTIAL (PRIMARY) HYPERTENSION: ICD-10-CM

## 2024-05-30 DIAGNOSIS — R63.0 POOR APPETITE: ICD-10-CM

## 2024-05-30 DIAGNOSIS — I73.9 PERIPHERAL VASCULAR DISEASE, UNSPECIFIED (HCC): ICD-10-CM

## 2024-05-30 DIAGNOSIS — Z00.00 MEDICARE ANNUAL WELLNESS VISIT, SUBSEQUENT: Primary | ICD-10-CM

## 2024-05-30 DIAGNOSIS — Z12.11 SCREEN FOR COLON CANCER: ICD-10-CM

## 2024-05-30 DIAGNOSIS — S32.10XA CLOSED FRACTURE OF SACRUM AND COCCYX, INITIAL ENCOUNTER (HCC): ICD-10-CM

## 2024-05-30 DIAGNOSIS — J44.9 CHRONIC OBSTRUCTIVE PULMONARY DISEASE, UNSPECIFIED COPD TYPE (HCC): ICD-10-CM

## 2024-05-30 DIAGNOSIS — C50.912 MALIGNANT NEOPLASM OF LEFT FEMALE BREAST, UNSPECIFIED ESTROGEN RECEPTOR STATUS, UNSPECIFIED SITE OF BREAST (HCC): ICD-10-CM

## 2024-05-30 DIAGNOSIS — Z71.89 ACP (ADVANCE CARE PLANNING): ICD-10-CM

## 2024-05-30 DIAGNOSIS — Z12.31 SCREENING MAMMOGRAM FOR BREAST CANCER: ICD-10-CM

## 2024-05-30 DIAGNOSIS — F39 UNSPECIFIED MOOD (AFFECTIVE) DISORDER (HCC): ICD-10-CM

## 2024-05-30 DIAGNOSIS — M62.838 MUSCLE SPASM: ICD-10-CM

## 2024-05-30 RX ORDER — MIRTAZAPINE 7.5 MG/1
7.5 TABLET, FILM COATED ORAL NIGHTLY
Qty: 30 TABLET | Refills: 5 | Status: SHIPPED | OUTPATIENT
Start: 2024-05-30

## 2024-05-30 RX ORDER — CYCLOBENZAPRINE HCL 10 MG
10 TABLET ORAL 3 TIMES DAILY PRN
Qty: 90 TABLET | Refills: 1 | Status: SHIPPED | OUTPATIENT
Start: 2024-05-30

## 2024-05-30 SDOH — ECONOMIC STABILITY: FOOD INSECURITY: WITHIN THE PAST 12 MONTHS, THE FOOD YOU BOUGHT JUST DIDN'T LAST AND YOU DIDN'T HAVE MONEY TO GET MORE.: NEVER TRUE

## 2024-05-30 SDOH — ECONOMIC STABILITY: INCOME INSECURITY: HOW HARD IS IT FOR YOU TO PAY FOR THE VERY BASICS LIKE FOOD, HOUSING, MEDICAL CARE, AND HEATING?: NOT VERY HARD

## 2024-05-30 SDOH — ECONOMIC STABILITY: FOOD INSECURITY: WITHIN THE PAST 12 MONTHS, YOU WORRIED THAT YOUR FOOD WOULD RUN OUT BEFORE YOU GOT MONEY TO BUY MORE.: NEVER TRUE

## 2024-05-30 ASSESSMENT — PATIENT HEALTH QUESTIONNAIRE - PHQ9
SUM OF ALL RESPONSES TO PHQ9 QUESTIONS 1 & 2: 0
2. FEELING DOWN, DEPRESSED OR HOPELESS: NOT AT ALL
1. LITTLE INTEREST OR PLEASURE IN DOING THINGS: NOT AT ALL
SUM OF ALL RESPONSES TO PHQ QUESTIONS 1-9: 0

## 2024-05-30 ASSESSMENT — LIFESTYLE VARIABLES
HOW OFTEN DO YOU HAVE A DRINK CONTAINING ALCOHOL: 2-4 TIMES A MONTH
HOW MANY STANDARD DRINKS CONTAINING ALCOHOL DO YOU HAVE ON A TYPICAL DAY: 1 OR 2

## 2024-05-30 NOTE — PROGRESS NOTES
1. \"Have you been to the ER, urgent care clinic since your last visit?  Hospitalized since your last visit?\"Yes When: fall 2 months ago    2. \"Have you seen or consulted any other health care providers outside of the [facility] since your last visit?\" No    3. For patients aged 45-75: Has the patient had a colonoscopy / FIT/ Cologuard? No      If the patient is female:    4. For patients aged 40-74: Has the patient had a mammogram within the past 2 years? No      5. For patients aged 21-65: Has the patient had a pap smear? Not applicable

## 2024-05-30 NOTE — ACP (ADVANCE CARE PLANNING)
Advance Care Planning     Advance Care Planning (ACP) Physician/NP/PA Conversation    Date of Conversation: 5/30/2024  Conducted with: Patient with Decision Making Capacity  Other persons present: None    Healthcare Decision Maker:   Primary Decision Maker (Active): Jose Chua - [phone]    Primary Decision Maker: Jose Loomis - [phone]  Click here to complete Healthcare Decision Makers including selection of the Healthcare Decision Maker Relationship (ie \"Primary\").   Today we documented Decision Maker(s) consistent with Legal Next of Kin hierarchy.    Care Preferences:    Hospitalization:  \"If your health worsens and it becomes clear that your chance of recovery is unlikely, what would be your preference regarding hospitalization?\"  The patient would prefer hospitalization.    Ventilation:  \"If you were unable to breath on your own and your chance of recovery was unlikely, what would be your preference about the use of a ventilator (breathing machine) if it was available to you?\"  The patient would desire the use of a ventilator.    Resuscitation:  \"In the event your heart stopped as a result of an underlying serious health condition, would you want attempts made to restart your heart, or would you prefer a natural death?\"  Yes, attempt to resuscitate.    treatment goals, ventilation preferences, hospitalization preferences, and resuscitation preferences    Conversation Outcomes / Follow-Up Plan:  ACP in process - completing/providing documents  Reviewed DNR/DNI and patient elects Full Code (Attempt Resuscitation)    Length of Voluntary ACP Conversation in minutes:  16 minutes    Arun Mitchell MD

## 2024-05-30 NOTE — PROGRESS NOTES
ETHEL  Henrietta Coley comes in for follow up care.  Breast cancer: Patient has history of breast cancer.  She has had left breast mastectomy and had chenotherapy done.  Currently doing supportive care.  She does get mammogram done.  I will send in request for mammogram.  HTN: Patient has hypertension.  Blood pressure is stable.  Patient is on amlodipine and valsartan.  Will continue with the current treatment plan.  Patient will take a low-sodium diet.  Dyslipidemia: Patient has dyslipidemia.  She is on atorvastatin.  Stable on medication.  She will take a diet low in polysaturated fats.  Will recheck lipid panel.  PAD: Patient has peripheral vascular disease.  She is doing supportive care.  Patient is on aspirin, Lipitor.  Patient will continue with the current management plan.  Arthralgia: Patient has arthralgia left foot.  She has pain and discomfort with weightbearing.  Patient denies erythema or swelling.  Will continue current treatment plan.  Fracture sacrum: Patient fell and sustained fracture of her sacrum.  She is being seen by the orthopedist.  Patient is doing supportive care for now.  She can take Tylenol as needed.  Mood disorder: Patient has history of mood disorder.  She has been feeling depressed.  States that her son lives at home and he is on a wheelchair.  This gets her depressed.  She would like some medication for this.  She is also doing supportive care.  We discussed management options.  I will start patient on Remeron.  I will follow-up at next visit.  Poor appetite: Patient has poor appetite.  She would like medication to help with her appetite.  I will send in Remeron.  Insomnia: Patient has insomnia.  She has trouble getting to sleep and staying asleep.  Patient is doing lifestyle and dietary modification.  She would like to try some medication to help with insomnia.  I will send in Remeron to take.  Muscle spasm: Patient has muscle spasms.  This affects her back and lower extremities.  She is

## 2024-06-02 VITALS
WEIGHT: 103 LBS | OXYGEN SATURATION: 100 % | DIASTOLIC BLOOD PRESSURE: 77 MMHG | TEMPERATURE: 98.1 F | HEART RATE: 73 BPM | SYSTOLIC BLOOD PRESSURE: 137 MMHG | HEIGHT: 64 IN | BODY MASS INDEX: 17.58 KG/M2 | RESPIRATION RATE: 20 BRPM

## 2024-06-14 DIAGNOSIS — E78.5 HYPERLIPIDEMIA, UNSPECIFIED HYPERLIPIDEMIA TYPE: ICD-10-CM

## 2024-06-14 NOTE — TELEPHONE ENCOUNTER
Last seen 5/30/2024   Last labs 01/24/2024  Last filled  12/20/2023  Next appointment 9/30/2024     Lab Results   Component Value Date     01/24/2024    K 4.1 01/24/2024     01/24/2024    CO2 27 01/24/2024    BUN 10 01/24/2024    CREATININE 0.6 (L) 01/24/2024    GLUCOSE 98 01/24/2024    CALCIUM 9.8 01/24/2024    BILITOT 0.7 01/24/2024    ALKPHOS 76 01/24/2024    AST 16 01/24/2024    ALT 10 01/24/2024    LABGLOM >60.0 01/24/2024    GFRAA >60 07/29/2021    AGRATIO 1.6 01/24/2024    GLOB 2.7 01/24/2024

## 2024-06-17 ENCOUNTER — TELEPHONE (OUTPATIENT)
Age: 74
End: 2024-06-17

## 2024-06-17 RX ORDER — ATORVASTATIN CALCIUM 80 MG/1
80 TABLET, FILM COATED ORAL DAILY
Qty: 90 TABLET | Refills: 1 | Status: SHIPPED | OUTPATIENT
Start: 2024-06-17

## 2024-06-17 NOTE — TELEPHONE ENCOUNTER
Referral for screening colonoscopy. Please review and advise.      Referral  Referral # [id number]  Referral Information    Referral # Creation Date Referral Status Status Update    [id number] 05/30/2024 In Progress 06/17/2024: Status History     Status Reason Referral Type Referral Reasons Referral Class   none Eval and Treat Specialty Services Required Internal     To Specialty To Provider To Location/Place of Service To Department   Gastroenterology Chau Mcclelland MD none [facility] - GASTROENTEROLOGY     To Vendor Referred By By Location/Place of Service By Department   none Arun Mitchell MD [facility]     Priority Start Date Expiration Date Referral Entered By   Routine 05/30/2024 11/26/2024 Arun Mitchell MD     Visits Requested Visits Authorized Visits Completed Visits Scheduled   2 2       Coverages    Payor Plan Auth. Required? Covered? Member # Authorized From Expires Auth # Precert. # Comment   [employer] [address] [employer] PPO SNP -- Covered [id number] 1/1/2023 -- -- -- --     Referral Information    Referral # Creation Date Referral Status Status Update    [id number] 05/30/2024 In Progress 06/17/2024: Status History     Status Reason Referral Type Referral Reasons Referral Class   none Eval and Treat Specialty Services Required Internal     To Specialty To Provider To Location/Place of Service To Department   Gastroenterology Chau Mcclelland MD none [facility] - GASTROENTEROLOGY     To Vendor Referred By By Location/Place of Service By Department   [facility]un Worley [facility]     Priority Start Date Expiration Date Referral Entered By   Routine 05/30/2024 11/26/2024 Arun Mitchell MD     Visits Requested Visits Authorized Visits Completed Visits Scheduled   2 2       Procedure Information    Service Details  Procedure Modifiers Provider

## 2024-06-27 DIAGNOSIS — I10 ESSENTIAL (PRIMARY) HYPERTENSION: ICD-10-CM

## 2024-06-27 RX ORDER — VALSARTAN 160 MG/1
160 TABLET ORAL DAILY
Qty: 90 TABLET | Refills: 1 | Status: SHIPPED | OUTPATIENT
Start: 2024-06-27

## 2024-07-22 DIAGNOSIS — I10 ESSENTIAL (PRIMARY) HYPERTENSION: ICD-10-CM

## 2024-07-23 ENCOUNTER — PREP FOR PROCEDURE (OUTPATIENT)
Age: 74
End: 2024-07-23

## 2024-07-23 ENCOUNTER — SCHEDULED TELEPHONE ENCOUNTER (OUTPATIENT)
Age: 74
End: 2024-07-23

## 2024-07-23 DIAGNOSIS — Z12.11 ENCOUNTER FOR SCREENING COLONOSCOPY: Primary | ICD-10-CM

## 2024-07-23 RX ORDER — VALSARTAN 160 MG/1
160 TABLET ORAL DAILY
Qty: 90 TABLET | Refills: 1 | OUTPATIENT
Start: 2024-07-23

## 2024-07-23 RX ORDER — POLYETHYLENE GLYCOL 3350, SODIUM SULFATE ANHYDROUS, SODIUM BICARBONATE, SODIUM CHLORIDE, POTASSIUM CHLORIDE 236; 22.74; 6.74; 5.86; 2.97 G/4L; G/4L; G/4L; G/4L; G/4L
4 POWDER, FOR SOLUTION ORAL ONCE
Qty: 4000 ML | Refills: 0 | Status: SHIPPED | OUTPATIENT
Start: 2024-07-23 | End: 2024-07-23

## 2024-08-03 DIAGNOSIS — M62.838 MUSCLE SPASM: ICD-10-CM

## 2024-08-05 RX ORDER — CYCLOBENZAPRINE HCL 10 MG
10 TABLET ORAL 3 TIMES DAILY PRN
Qty: 90 TABLET | Refills: 1 | Status: SHIPPED | OUTPATIENT
Start: 2024-08-05

## 2024-08-09 ENCOUNTER — ANESTHESIA EVENT (OUTPATIENT)
Age: 74
End: 2024-08-09
Payer: MEDICARE

## 2024-08-09 RX ORDER — SODIUM CHLORIDE 9 MG/ML
INJECTION, SOLUTION INTRAVENOUS PRN
Status: CANCELLED | OUTPATIENT
Start: 2024-08-09

## 2024-08-09 RX ORDER — SODIUM CHLORIDE 0.9 % (FLUSH) 0.9 %
5-40 SYRINGE (ML) INJECTION EVERY 12 HOURS SCHEDULED
Status: CANCELLED | OUTPATIENT
Start: 2024-08-09

## 2024-08-17 RX ORDER — SODIUM CHLORIDE, SODIUM LACTATE, POTASSIUM CHLORIDE, CALCIUM CHLORIDE 600; 310; 30; 20 MG/100ML; MG/100ML; MG/100ML; MG/100ML
INJECTION, SOLUTION INTRAVENOUS CONTINUOUS
Status: CANCELLED | OUTPATIENT
Start: 2024-08-17

## 2024-08-17 NOTE — H&P
Open access updated H&P.      Brief history: The patient is female Black /  73 y.o. who was referred for screening colonoscopy.  Review of the records showed that they had few if any health problems, excessive obesity, or significant medications that would require office evaluation prior to colonoscopy for colon cancer screening.  After review of their chart, contact was made with the patient in discussion and literature sent regarding the procedure and the bowel preparation.  They are here now for their procedure.      Past medical and surgical history:   Past Medical History:   Diagnosis Date    Arthritis     Blind left eye     Breast cancer (HCC)     Glaucoma     High cholesterol     Hip pain     Hypertension       Past Surgical History:   Procedure Laterality Date    COLONOSCOPY  2008    hx of polyps    COLONOSCOPY N/A 5/9/2018    COLONOSCOPY / polypectomy performed by Eber Galicia MD at [facility] ENDOSCOPY    MASTECTOMY Left     VASCULAR SURGERY Bilateral     vein cleaning to help with nerves        Allergies:  No Known Allergies     Medications:  No current facility-administered medications for this encounter.    Current Outpatient Medications:     cyclobenzaprine (FLEXERIL) 10 MG tablet, TAKE 1 TABLET BY MOUTH THREE TIMES DAILY AS NEEDED FOR MUSCLE SPASMS, Disp: 90 tablet, Rfl: 1    valsartan (DIOVAN) 160 MG tablet, TAKE 1 TABLET BY MOUTH DAILY, Disp: 90 tablet, Rfl: 1    atorvastatin (LIPITOR) 80 MG tablet, TAKE 1 TABLET BY MOUTH DAILY, Disp: 90 tablet, Rfl: 1    mirtazapine (REMERON) 7.5 MG tablet, Take 1 tablet by mouth nightly, Disp: 30 tablet, Rfl: 5    diclofenac sodium (VOLTAREN) 1 % GEL, Apply 4 g topically 4 times daily Apply to left foot qid as needed for pain, Disp: 200 g, Rfl: 2    amLODIPine (NORVASC) 10 MG tablet, Take 1 tablet by mouth daily, Disp: 90 tablet, Rfl: 1    albuterol sulfate HFA (PROVENTIL;VENTOLIN;PROAIR) 108 (90 Base) MCG/ACT inhaler, INHALE 2 PUFFS BY MOUTH EVERY 6

## 2024-08-21 ENCOUNTER — HOSPITAL ENCOUNTER (OUTPATIENT)
Age: 74
Setting detail: OUTPATIENT SURGERY
Discharge: HOME | End: 2024-08-21
Attending: INTERNAL MEDICINE | Admitting: INTERNAL MEDICINE
Payer: MEDICARE

## 2024-08-21 ENCOUNTER — ANESTHESIA (OUTPATIENT)
Age: 74
End: 2024-08-21
Payer: MEDICARE

## 2024-08-21 VITALS
SYSTOLIC BLOOD PRESSURE: 176 MMHG | RESPIRATION RATE: 18 BRPM | HEART RATE: 79 BPM | DIASTOLIC BLOOD PRESSURE: 69 MMHG | BODY MASS INDEX: 17.34 KG/M2 | TEMPERATURE: 98.4 F | OXYGEN SATURATION: 99 % | WEIGHT: 101 LBS

## 2024-08-21 DIAGNOSIS — Z12.11 ENCOUNTER FOR SCREENING COLONOSCOPY: ICD-10-CM

## 2024-08-21 PROCEDURE — 3600007511: Performed by: INTERNAL MEDICINE

## 2024-08-21 PROCEDURE — 3600007501: Performed by: INTERNAL MEDICINE

## 2024-08-21 PROCEDURE — 3700000000 HC ANESTHESIA ATTENDED CARE: Performed by: INTERNAL MEDICINE

## 2024-08-21 PROCEDURE — 7100000011 HC PHASE II RECOVERY - ADDTL 15 MIN: Performed by: INTERNAL MEDICINE

## 2024-08-21 PROCEDURE — 3700000001 HC ADD 15 MINUTES (ANESTHESIA): Performed by: INTERNAL MEDICINE

## 2024-08-21 PROCEDURE — 2709999900 HC NON-CHARGEABLE SUPPLY: Performed by: INTERNAL MEDICINE

## 2024-08-21 PROCEDURE — G0121 COLON CA SCRN NOT HI RSK IND: HCPCS | Performed by: INTERNAL MEDICINE

## 2024-08-21 PROCEDURE — 2580000003 HC RX 258: Performed by: INTERNAL MEDICINE

## 2024-08-21 PROCEDURE — 7100000010 HC PHASE II RECOVERY - FIRST 15 MIN: Performed by: INTERNAL MEDICINE

## 2024-08-21 PROCEDURE — 2500000003 HC RX 250 WO HCPCS: Performed by: NURSE ANESTHETIST, CERTIFIED REGISTERED

## 2024-08-21 PROCEDURE — 6360000002 HC RX W HCPCS: Performed by: NURSE ANESTHETIST, CERTIFIED REGISTERED

## 2024-08-21 RX ORDER — SODIUM CHLORIDE, SODIUM LACTATE, POTASSIUM CHLORIDE, CALCIUM CHLORIDE 600; 310; 30; 20 MG/100ML; MG/100ML; MG/100ML; MG/100ML
INJECTION, SOLUTION INTRAVENOUS CONTINUOUS
Status: DISCONTINUED | OUTPATIENT
Start: 2024-08-21 | End: 2024-08-21 | Stop reason: HOSPADM

## 2024-08-21 RX ORDER — PROPOFOL 10 MG/ML
INJECTION, EMULSION INTRAVENOUS PRN
Status: DISCONTINUED | OUTPATIENT
Start: 2024-08-21 | End: 2024-08-21 | Stop reason: SDUPTHER

## 2024-08-21 RX ORDER — EPHEDRINE SULFATE/0.9% NACL/PF 25 MG/5 ML
SYRINGE (ML) INTRAVENOUS PRN
Status: DISCONTINUED | OUTPATIENT
Start: 2024-08-21 | End: 2024-08-21 | Stop reason: SDUPTHER

## 2024-08-21 RX ORDER — SODIUM CHLORIDE 0.9 % (FLUSH) 0.9 %
5-40 SYRINGE (ML) INJECTION PRN
Status: DISCONTINUED | OUTPATIENT
Start: 2024-08-21 | End: 2024-08-21 | Stop reason: HOSPADM

## 2024-08-21 RX ADMIN — EPHEDRINE SULFATE 10 MG: 5 INJECTION INTRAVENOUS at 11:18

## 2024-08-21 RX ADMIN — EPHEDRINE SULFATE 10 MG: 5 INJECTION INTRAVENOUS at 11:14

## 2024-08-21 RX ADMIN — PROPOFOL 50 MG: 10 INJECTION, EMULSION INTRAVENOUS at 11:15

## 2024-08-21 RX ADMIN — SODIUM CHLORIDE, POTASSIUM CHLORIDE, SODIUM LACTATE AND CALCIUM CHLORIDE: 600; 310; 30; 20 INJECTION, SOLUTION INTRAVENOUS at 10:01

## 2024-08-21 RX ADMIN — PROPOFOL 100 MG: 10 INJECTION, EMULSION INTRAVENOUS at 11:08

## 2024-08-21 ASSESSMENT — PAIN - FUNCTIONAL ASSESSMENT
PAIN_FUNCTIONAL_ASSESSMENT: 0-10
PAIN_FUNCTIONAL_ASSESSMENT: NONE - DENIES PAIN
PAIN_FUNCTIONAL_ASSESSMENT: NONE - DENIES PAIN
PAIN_FUNCTIONAL_ASSESSMENT: 0-10

## 2024-08-21 ASSESSMENT — COPD QUESTIONNAIRES: CAT_SEVERITY: NO INTERVAL CHANGE

## 2024-08-21 NOTE — DISCHARGE INSTRUCTIONS
Impression:  Scattered diverticulosis throughout the colon.  Grade 1 internal hemorrhoids and prominent anal papillae.  External skin tags.        Recommendations:  Check pathology.  Will notify patient with results when they are available.  Repeat colonoscopy in  10 years for screening or none-patient choice.  Follow up as needed.

## 2024-08-21 NOTE — OP NOTE
Colonoscopy procedure note    Date of service: 8/21/2024    Type:  Screening    Indication for procedure: Colon cancer screening    Anesthesia classification: ASA class 2    Patient history and physical been accomplished and documented.  Patient is assessed and determined to be appropriate candidate for planned procedure and sedation; patient reassessed immediately prior to sedation.      Sedation plan: MAC per anesthesia    Surgical assistant: Not applicable    Airway assessment: Range of motion: Normal, mouth opening, Visual obstruction: No.    UPDATED PREOP EXAM:  Unchanged.    VS: Reviewed  Gen: in NAD  CV: RRR, no murmur  Resp: CTA  Abd: Soft, NTND, +BS  Extrem: No cyanosis or edema  Neuro: Awake and alert    Informed consent obtained: Yes.  The indications, risks including but not limited to bleeding, perforation, infection, death, and potential failure to visual areas are diagnosed neoplasia, alternatives and benefits were discussed with the patient prior to the procedure.  Patient identity and procedure was verified, absent was obtained, and is consistent with the consent form found in the patient's records.    PROCEDURE PERFORMED:  COLONOSCOPY  to the cecum with MAC     INSTRUMENT: Olympus colonoscope per nursing notes.    FINDINGS:    External anal lesions: Normal except for external skin tags  Rectum: normal.  Rectal sphincter tone was normal.   Retroflexion view: Grade 1 internal hemorrhoids and prominent anal papillae.  Sigmoid: normal except for diverticulosis.  Descending Colon: normal except for diverticulosis.  Transverse Colon: normal save for diverticulosis.  Ascending Colon: normal except for diverticulosis.  Cecum: normal, including the appendiceal orifice and ileocecal valve.    Terminal ileum: not evaluated     Specimens: none     Bowel preparation- adequate to detect small (5mm) polyps or larger.    Estimated blood loss: none   Complications:  none   Cecal withdrawal time: 6

## 2024-08-21 NOTE — INTERVAL H&P NOTE
Update History & Physical    The patient's History and Physical of August 21, 2024 was reviewed with the patient and I examined the patient. There was no change. The surgical site was confirmed by the patient and me.     Plan: The risks, benefits, expected outcome, and alternative to the recommended procedure have been discussed with the patient. Patient understands and wants to proceed with the procedure.     Electronically signed by Chau Mcclelland MD on 8/21/2024 at 9:50 AM

## 2024-08-21 NOTE — ANESTHESIA PRE PROCEDURE
Department of Anesthesiology  Preprocedure Note       Name:  Henrietta Coley   Age:  73 y.o.  :  1950                                          MRN:  [id number]         Date:  2024      Surgeon: Surgeon(s):  Chau Mcclelland MD    Procedure: Procedure(s):  Colonoscopy    Medications prior to admission:   Prior to Admission medications    Medication Sig Start Date End Date Taking? Authorizing Provider   cyclobenzaprine (FLEXERIL) 10 MG tablet TAKE 1 TABLET BY MOUTH THREE TIMES DAILY AS NEEDED FOR MUSCLE SPASMS 24  Yes Arun Mitchell MD   valsartan (DIOVAN) 160 MG tablet TAKE 1 TABLET BY MOUTH DAILY 24  Yes Arun Mitchell MD   atorvastatin (LIPITOR) 80 MG tablet TAKE 1 TABLET BY MOUTH DAILY 24  Yes Arun Mitchell MD   mirtazapine (REMERON) 7.5 MG tablet Take 1 tablet by mouth nightly 24  Yes Arun Mitchell MD   diclofenac sodium (VOLTAREN) 1 % GEL Apply 4 g topically 4 times daily Apply to left foot qid as needed for pain 24  Yes Cristal Padilla MD   amLODIPine (NORVASC) 10 MG tablet Take 1 tablet by mouth daily 23  Yes Arun Mitchell MD   albuterol sulfate HFA (PROVENTIL;VENTOLIN;PROAIR) 108 (90 Base) MCG/ACT inhaler INHALE 2 PUFFS BY MOUTH EVERY 6 HOURS AS NEEDED FOR WHEEZING 23  Yes Arun Mitchell MD   aspirin 81 MG EC tablet Take 1 tablet by mouth daily 23  Yes Arun Mitchell MD   brimonidine-timolol (COMBIGAN) 0.2-0.5 % ophthalmic solution INSTILL 1 DROP DAILY IN RIGHT EYE 17  Yes Automatic Reconciliation, Ar       Current medications:    Current Facility-Administered Medications   Medication Dose Route Frequency Provider Last Rate Last Admin   • lactated ringers IV soln infusion   IntraVENous Continuous Ct Buck APRN - CRNA       • sodium chloride flush 0.9 % injection 5-40 mL  5-40 mL IntraVENous PRN Ct Buck APRN - CRNA       • lactated ringers IV soln infusion   IntraVENous Continuous Chau Mcclelland

## 2024-08-22 PROBLEM — Z12.11 ENCOUNTER FOR SCREENING COLONOSCOPY: Status: RESOLVED | Noted: 2024-07-23 | Resolved: 2024-08-22

## 2024-09-03 ENCOUNTER — COMMUNITY OUTREACH (OUTPATIENT)
Facility: CLINIC | Age: 74
End: 2024-09-03

## 2024-12-01 DIAGNOSIS — E78.5 HYPERLIPIDEMIA, UNSPECIFIED HYPERLIPIDEMIA TYPE: ICD-10-CM

## 2024-12-03 RX ORDER — ATORVASTATIN CALCIUM 80 MG/1
80 TABLET, FILM COATED ORAL DAILY
Qty: 90 TABLET | Refills: 1 | Status: SHIPPED | OUTPATIENT
Start: 2024-12-03

## 2025-03-01 DIAGNOSIS — I10 ESSENTIAL (PRIMARY) HYPERTENSION: ICD-10-CM

## 2025-03-03 RX ORDER — VALSARTAN 160 MG/1
160 TABLET ORAL DAILY
Qty: 90 TABLET | Refills: 0 | Status: SHIPPED | OUTPATIENT
Start: 2025-03-03

## 2025-03-03 NOTE — TELEPHONE ENCOUNTER
Last seen 5/30/2024   Last labs 1/24/2024  Last filled  6/27/2024  Next appointment Visit date not found     Lab Results   Component Value Date     01/24/2024    K 4.1 01/24/2024     01/24/2024    CO2 27 01/24/2024    BUN 10 01/24/2024    CREATININE 0.6 (L) 01/24/2024    GLUCOSE 98 01/24/2024    CALCIUM 9.8 01/24/2024    BILITOT 0.7 01/24/2024    ALKPHOS 76 01/24/2024    AST 16 01/24/2024    ALT 10 01/24/2024    LABGLOM >60.0 01/24/2024    GFRAA >60 07/29/2021    AGRATIO 1.6 01/24/2024    GLOB 2.7 01/24/2024

## 2025-05-30 DIAGNOSIS — I10 ESSENTIAL (PRIMARY) HYPERTENSION: ICD-10-CM

## 2025-05-30 RX ORDER — VALSARTAN 160 MG/1
160 TABLET ORAL DAILY
Qty: 90 TABLET | Refills: 0 | OUTPATIENT
Start: 2025-05-30

## (undated) DEVICE — FORCEPS BX L240CM JAW DIA2.8MM L CAP W/ NDL MIC MESH TOOTH

## (undated) DEVICE — SOLUTION IRRIG 500ML STRL H2O NONPYROGENIC

## (undated) DEVICE — TUBING INSUFFLATION CAP W/ EXT CARBON DIOX ENDO SMARTCAP

## (undated) DEVICE — Device: Brand: DEFENDO VALVE AND CONNECTOR KIT

## (undated) DEVICE — TUBING, SUCTION, 9/32" X 10', STRAIGHT: Brand: MEDLINE

## (undated) DEVICE — AIRLIFE™ NASAL OXYGEN CANNULA CURVED, NONFLARED TIP WITH 14 FOOT (4.3 M) CRUSH-RESISTANT TUBING, OVER-THE-EAR STYLE: Brand: AIRLIFE™

## (undated) DEVICE — MEDI-VAC NON-CONDUCTIVE SUCTION TUBING: Brand: CARDINAL HEALTH

## (undated) DEVICE — FLEX ADVANTAGE 1500CC: Brand: FLEX ADVANTAGE

## (undated) DEVICE — KIT COLON W/ 1.1OZ LUB AND 2 END

## (undated) DEVICE — SOLUTION IRRIG 1000ML STRL H2O USP PLAS POUR BTL

## (undated) DEVICE — REM POLYHESIVE ADULT PATIENT RETURN ELECTRODE: Brand: VALLEYLAB

## (undated) DEVICE — TUBING IRRIGATION BK FLO VLV FOR OFP ENDOSTAT ENDOGATOR DISP

## (undated) DEVICE — CATH IV SAFE STR 22GX1IN BLU -- PROTECTIV PLUS

## (undated) DEVICE — Device